# Patient Record
Sex: FEMALE | Race: WHITE | NOT HISPANIC OR LATINO | Employment: OTHER | URBAN - NONMETROPOLITAN AREA
[De-identification: names, ages, dates, MRNs, and addresses within clinical notes are randomized per-mention and may not be internally consistent; named-entity substitution may affect disease eponyms.]

---

## 2019-04-06 ENCOUNTER — APPOINTMENT (OUTPATIENT)
Dept: CT IMAGING | Facility: HOSPITAL | Age: 79
End: 2019-04-06

## 2019-04-06 ENCOUNTER — HOSPITAL ENCOUNTER (EMERGENCY)
Facility: HOSPITAL | Age: 79
Discharge: HOME | End: 2019-04-06
Attending: EMERGENCY MEDICINE | Admitting: EMERGENCY MEDICINE

## 2019-04-06 VITALS
DIASTOLIC BLOOD PRESSURE: 66 MMHG | HEART RATE: 88 BPM | RESPIRATION RATE: 18 BRPM | OXYGEN SATURATION: 97 % | SYSTOLIC BLOOD PRESSURE: 129 MMHG | HEIGHT: 62 IN | BODY MASS INDEX: 27.23 KG/M2 | WEIGHT: 148 LBS | TEMPERATURE: 97.5 F

## 2019-04-06 DIAGNOSIS — N28.9 ACUTE RENAL INSUFFICIENCY: ICD-10-CM

## 2019-04-06 DIAGNOSIS — R19.7 COMBINED ABDOMINAL PAIN, VOMITING, AND DIARRHEA: Primary | ICD-10-CM

## 2019-04-06 DIAGNOSIS — R10.9 COMBINED ABDOMINAL PAIN, VOMITING, AND DIARRHEA: Primary | ICD-10-CM

## 2019-04-06 DIAGNOSIS — R11.10 COMBINED ABDOMINAL PAIN, VOMITING, AND DIARRHEA: Primary | ICD-10-CM

## 2019-04-06 DIAGNOSIS — E86.0 DEHYDRATION: ICD-10-CM

## 2019-04-06 LAB
ALBUMIN SERPL-MCNC: 3 G/DL (ref 3.5–5)
ALBUMIN/GLOB SERPL: 1 G/DL (ref 1.1–2.5)
ALP SERPL-CCNC: 94 U/L (ref 24–120)
ALT SERPL W P-5'-P-CCNC: 38 U/L (ref 0–54)
ANION GAP SERPL CALCULATED.3IONS-SCNC: 11 MMOL/L (ref 4–13)
AST SERPL-CCNC: 82 U/L (ref 7–45)
BASOPHILS # BLD AUTO: 0.07 10*3/MM3 (ref 0–0.2)
BASOPHILS NFR BLD AUTO: 0.9 % (ref 0–2)
BILIRUB SERPL-MCNC: 0.6 MG/DL (ref 0.1–1)
BILIRUB UR QL STRIP: NEGATIVE
BUN BLD-MCNC: 40 MG/DL (ref 5–21)
BUN/CREAT SERPL: 25.6 (ref 7–25)
CALCIUM SPEC-SCNC: 9 MG/DL (ref 8.4–10.4)
CHLORIDE SERPL-SCNC: 109 MMOL/L (ref 98–110)
CLARITY UR: CLEAR
CO2 SERPL-SCNC: 20 MMOL/L (ref 24–31)
COLOR UR: YELLOW
CREAT BLD-MCNC: 1.56 MG/DL (ref 0.5–1.4)
D-LACTATE SERPL-SCNC: 1.8 MMOL/L (ref 0.5–2)
DEPRECATED RDW RBC AUTO: 52.7 FL (ref 40–54)
EOSINOPHIL # BLD AUTO: 0.19 10*3/MM3 (ref 0–0.7)
EOSINOPHIL NFR BLD AUTO: 2.3 % (ref 0–4)
ERYTHROCYTE [DISTWIDTH] IN BLOOD BY AUTOMATED COUNT: 17.1 % (ref 12–15)
GFR SERPL CREATININE-BSD FRML MDRD: 32 ML/MIN/1.73
GLOBULIN UR ELPH-MCNC: 3 GM/DL
GLUCOSE BLD-MCNC: 96 MG/DL (ref 70–100)
GLUCOSE UR STRIP-MCNC: NEGATIVE MG/DL
HCT VFR BLD AUTO: 36.4 % (ref 37–47)
HGB BLD-MCNC: 11.8 G/DL (ref 12–16)
HGB UR QL STRIP.AUTO: NEGATIVE
IMM GRANULOCYTES # BLD AUTO: 0.04 10*3/MM3 (ref 0–0.05)
IMM GRANULOCYTES NFR BLD AUTO: 0.5 % (ref 0–5)
KETONES UR QL STRIP: ABNORMAL
LEUKOCYTE ESTERASE UR QL STRIP.AUTO: NEGATIVE
LIPASE SERPL-CCNC: 425 U/L (ref 23–203)
LYMPHOCYTES # BLD AUTO: 1.92 10*3/MM3 (ref 0.72–4.86)
LYMPHOCYTES NFR BLD AUTO: 23.5 % (ref 15–45)
MCH RBC QN AUTO: 27.2 PG (ref 28–32)
MCHC RBC AUTO-ENTMCNC: 32.4 G/DL (ref 33–36)
MCV RBC AUTO: 83.9 FL (ref 82–98)
MONOCYTES # BLD AUTO: 0.54 10*3/MM3 (ref 0.19–1.3)
MONOCYTES NFR BLD AUTO: 6.6 % (ref 4–12)
NEUTROPHILS # BLD AUTO: 5.41 10*3/MM3 (ref 1.87–8.4)
NEUTROPHILS NFR BLD AUTO: 66.2 % (ref 39–78)
NITRITE UR QL STRIP: NEGATIVE
NRBC BLD AUTO-RTO: 0 /100 WBC (ref 0–0)
PH UR STRIP.AUTO: <=5 [PH] (ref 5–8)
PLATELET # BLD AUTO: 165 10*3/MM3 (ref 130–400)
PMV BLD AUTO: 12.8 FL (ref 6–12)
POTASSIUM BLD-SCNC: 4.8 MMOL/L (ref 3.5–5.3)
PROT SERPL-MCNC: 6 G/DL (ref 6.3–8.7)
PROT UR QL STRIP: NEGATIVE
RBC # BLD AUTO: 4.34 10*6/MM3 (ref 4.2–5.4)
SODIUM BLD-SCNC: 140 MMOL/L (ref 135–145)
SP GR UR STRIP: 1.02 (ref 1–1.03)
UROBILINOGEN UR QL STRIP: ABNORMAL
WBC NRBC COR # BLD: 8.17 10*3/MM3 (ref 4.8–10.8)

## 2019-04-06 PROCEDURE — 80053 COMPREHEN METABOLIC PANEL: CPT | Performed by: EMERGENCY MEDICINE

## 2019-04-06 PROCEDURE — 25010000002 ONDANSETRON PER 1 MG: Performed by: EMERGENCY MEDICINE

## 2019-04-06 PROCEDURE — 83605 ASSAY OF LACTIC ACID: CPT | Performed by: EMERGENCY MEDICINE

## 2019-04-06 PROCEDURE — 83690 ASSAY OF LIPASE: CPT | Performed by: EMERGENCY MEDICINE

## 2019-04-06 PROCEDURE — 74176 CT ABD & PELVIS W/O CONTRAST: CPT

## 2019-04-06 PROCEDURE — 99283 EMERGENCY DEPT VISIT LOW MDM: CPT

## 2019-04-06 PROCEDURE — 85025 COMPLETE CBC W/AUTO DIFF WBC: CPT | Performed by: EMERGENCY MEDICINE

## 2019-04-06 PROCEDURE — 96374 THER/PROPH/DIAG INJ IV PUSH: CPT

## 2019-04-06 PROCEDURE — 81003 URINALYSIS AUTO W/O SCOPE: CPT | Performed by: EMERGENCY MEDICINE

## 2019-04-06 RX ORDER — SODIUM CHLORIDE 0.9 % (FLUSH) 0.9 %
10 SYRINGE (ML) INJECTION AS NEEDED
Status: DISCONTINUED | OUTPATIENT
Start: 2019-04-06 | End: 2019-04-06 | Stop reason: HOSPADM

## 2019-04-06 RX ORDER — ONDANSETRON 2 MG/ML
4 INJECTION INTRAMUSCULAR; INTRAVENOUS ONCE
Status: COMPLETED | OUTPATIENT
Start: 2019-04-06 | End: 2019-04-06

## 2019-04-06 RX ADMIN — SODIUM CHLORIDE, PRESERVATIVE FREE 10 ML: 5 INJECTION INTRAVENOUS at 18:17

## 2019-04-06 RX ADMIN — SODIUM CHLORIDE 1000 ML: 9 INJECTION, SOLUTION INTRAVENOUS at 18:16

## 2019-04-06 RX ADMIN — ONDANSETRON HYDROCHLORIDE 4 MG: 2 SOLUTION INTRAMUSCULAR; INTRAVENOUS at 18:15

## 2019-04-07 NOTE — ED PROVIDER NOTES
"    [facility]  emergency department encounter      Pt Name: Rosemary Petersen  MRN: [id number]  Birthdate 1940  Date of evaluation: 4/6/2019      CHIEF COMPLAINT       Chief Complaint   Patient presents with   • Abdominal Pain   • Nausea   • Diarrhea       Nurses Notes reviewed and I agree except as noted in the HPI.      HISTORY OF PRESENT ILLNESS    Rosemary Petersen is a 78 y.o. female who presents to the emergency department complaining of diffuse abdominal pain, nausea, vomiting, and diarrhea for the past 23 days.  She denies dysuria, fever, chills, chest pain, shortness of breath, melena, hematochezia.  She does note a history of hysterectomy, cholecystectomy, and appendectomy.  She denies any other chronic medical conditions.  She has Phenergan and Zofran at home.    REVIEW OF SYSTEMS     All systems reviewed and otherwise negative except as listed above in HPI      PAST MEDICAL HISTORY   History reviewed. No pertinent past medical history.    SURGICAL HISTORY      has no past surgical history on file.    CURRENT MEDICATIONS        Medication List      You have not been prescribed any medications.       ALLERGIES     has No Known Allergies.    FAMILY HISTORY     has no family status information on file.    family history is not on file.    SOCIAL HISTORY          PHYSICAL EXAM     INITIAL VITALS:  height is 157.5 cm (62\") and weight is 67.1 kg (148 lb). Her oral temperature is 97.5 °F (36.4 °C). Her blood pressure is 129/66 and her pulse is 88. Her respiration is 18 and oxygen saturation is 97%.    Physical Exam    CONSTITUTIONAL: Well developed,  not diaphoretic nor distressed  HENT: Normocephalic, atraumatic, oropharynx clear and dry  EYES: PERRL, EOM normal, no discharge, no scleral icterus  NECK: ROM normal, supple, no tracheal deviation nor JVD, no stridor  CARDIOVASCULAR: Tachycardic rate, normal rhythm, heart sounds normal, no rub no gallop, intact distal pulses, normal cap " refill  PULMONARY: Normal effort and breath sounds, no distress, no wheezes, rhonchi or rales, no chest tenderness  ABDOMINAL: Soft, mild diffuse tenderness, no guarding, no mass, no rebound, no hernia  GENITOURINARY/ANORECTAL: deferred  MUSCULOSKELETAL: ROM normal, no tenderness nor deformity, no edema  NEUROLOGICAL: Alert, oriented x 3, normal tone, sensation normal  SKIN: Warm, dry, no erythema, no rash, normal color  PSYCH: Mood and affect normal, behavior normal, thought content and judgement normal.      DIAGNOSTIC RESULTS     EKG: All EKG's are interpreted by the Emergency Department Physician who either signs or Co-signs this chart in the absence of a cardiologist.  None    RADIOLOGY: non-plain film images(s) such as CT, Ultrasound and MRI are read by the radiologist.  Plain radiographic images are visualized and preliminarily interpreted by the emergency physician unless otherwise stated below.  Ct Abdomen Pelvis Without Contrast    Result Date: 4/6/2019  1. Right nephrolithiasis.   2. Left ventral hernia which contains a loop of small bowel. This is not causing an obstruction at this time. 3. Diverticulosis.   This report was finalized on 04/06/2019 18:50 by Dr. Herman Feliciano MD.             LABS:   Lab Results (last 24 hours)     Procedure Component Value Units Date/Time    CBC & Differential [[id number]] Collected:  04/06/19 1814    Specimen:  Blood Updated:  04/06/19 1826    Narrative:       The following orders were created for panel order CBC & Differential.  Procedure                               Abnormality         Status                     ---------                               -----------         ------                     CBC Auto Differential[[id number]]        Abnormal            Final result                 Please view results for these tests on the individual orders.    Comprehensive Metabolic Panel [[id number]]  (Abnormal) Collected:  04/06/19 1814    Specimen:  Blood Updated:  04/06/19  1838     Glucose 96 mg/dL      BUN 40 mg/dL      Creatinine 1.56 mg/dL      Sodium 140 mmol/L      Potassium 4.8 mmol/L      Comment: Specimen hemolyzed.  Results may be affected.        Chloride 109 mmol/L      CO2 20.0 mmol/L      Calcium 9.0 mg/dL      Total Protein 6.0 g/dL      Albumin 3.00 g/dL      ALT (SGPT) 38 U/L      Comment: Specimen hemolyzed.  Results may be affected.        AST (SGOT) 82 U/L      Comment: Specimen hemolyzed.  Results may be affected.        Alkaline Phosphatase 94 U/L      Comment: Specimen hemolyzed. Results may be affected.        Total Bilirubin 0.6 mg/dL      eGFR Non African Amer 32 mL/min/1.73      Globulin 3.0 gm/dL      A/G Ratio 1.0 g/dL      BUN/Creatinine Ratio 25.6     Anion Gap 11.0 mmol/L     Narrative:       GFR Normal >60  Chronic Kidney Disease <60  Kidney Failure <15    Lipase [[id number]]  (Abnormal) Collected:  04/06/19 1814    Specimen:  Blood Updated:  04/06/19 1837     Lipase 425 U/L     Lactic Acid, Plasma [[id number]]  (Normal) Collected:  04/06/19 1814    Specimen:  Blood Updated:  04/06/19 1838     Lactate 1.8 mmol/L     CBC Auto Differential [[id number]]  (Abnormal) Collected:  04/06/19 1814    Specimen:  Blood Updated:  04/06/19 1826     WBC 8.17 10*3/mm3      RBC 4.34 10*6/mm3      Hemoglobin 11.8 g/dL      Hematocrit 36.4 %      MCV 83.9 fL      MCH 27.2 pg      MCHC 32.4 g/dL      RDW 17.1 %      RDW-SD 52.7 fl      MPV 12.8 fL      Platelets 165 10*3/mm3      Neutrophil % 66.2 %      Lymphocyte % 23.5 %      Monocyte % 6.6 %      Eosinophil % 2.3 %      Basophil % 0.9 %      Immature Grans % 0.5 %      Neutrophils, Absolute 5.41 10*3/mm3      Lymphocytes, Absolute 1.92 10*3/mm3      Monocytes, Absolute 0.54 10*3/mm3      Eosinophils, Absolute 0.19 10*3/mm3      Basophils, Absolute 0.07 10*3/mm3      Immature Grans, Absolute 0.04 10*3/mm3      nRBC 0.0 /100 WBC     Urinalysis With Culture If Indicated - Urine, Clean Catch [[id number]]  (Abnormal) Collected:  " 04/06/19 1842    Specimen:  Urine, Clean Catch Updated:  04/06/19 1853     Color, UA Yellow     Appearance, UA Clear     pH, UA <=5.0     Specific Gravity, UA 1.024     Glucose, UA Negative     Ketones, UA Trace     Bilirubin, UA Negative     Blood, UA Negative     Protein, UA Negative     Leuk Esterase, UA Negative     Nitrite, UA Negative     Urobilinogen, UA 0.2 E.U./dL    Narrative:       Urine microscopic not indicated.          EMERGENCY DEPARTMENT COURSE:   Vitals:    Vitals:    04/06/19 1700 04/06/19 2030   BP: 129/61 129/66   BP Location: Right arm Left arm   Patient Position: Sitting Lying   Pulse: 120 88   Resp: 16 18   Temp: 98.5 °F (36.9 °C) 97.5 °F (36.4 °C)   TempSrc: Oral Oral   SpO2: 97% 97%   Weight: 67.1 kg (148 lb)    Height: 157.5 cm (62\")        The patient was given the following medications:  Medications   sodium chloride 0.9 % flush 10 mL (10 mL Intravenous Given 4/6/19 1817)   sodium chloride 0.9 % bolus 1,000 mL (0 mL Intravenous Stopped 4/6/19 1845)   ondansetron (ZOFRAN) injection 4 mg (4 mg Intravenous Given 4/6/19 1815)            CRITICAL CARE:  none    CONSULTS:  none    PROCEDURES:  Procedures        Patient presents to the emergency department with 23 days of abdominal pain, vomiting, diarrhea.  She has dry mucous membranes, diffuse dental tenderness, and is tachycardic.  CT scan of abdomen and pelvis shows a hiatal hernia with no acute abnormalities.  Labs show renal insufficiency of unknown chronicity.  Patient denies any history of renal insufficiency so this is likely acute renal insufficiency secondary to dehydration.  Her lipase is also elevated.  She received IV fluids and Zofran.  She felt much better and requested discharge home.  She tolerated an oral fluid challenge.  She was no longer tachycardic upon reevaluation.  She has Phenergan and Zofran at home.  She would like to follow-up with her PCP.  She is comfortable at time of discharge and agreeable with plan of " care.    FINAL IMPRESSION      1. Combined abdominal pain, vomiting, and diarrhea    2. Acute renal insufficiency    3. Dehydration          DISPOSITION/PLAN   DC      PATIENT REFERRED TO:  Daquan Bautista MD  [address] [facility] [id number] [phone]    Schedule an appointment as soon as possible for a visit in 2 days        DISCHARGE MEDICATIONS:     Medication List      You have not been prescribed any medications.       (Please note that portions of this note were completed with a voice recognition program.)    DO Salvador Walker Jason Paul, DO  04/06/19 5686

## 2019-04-07 NOTE — DISCHARGE INSTRUCTIONS
Read all instructions in this handout.   Call your primary care physician for a follow up appointment in 1-2 days.   Return to the emergency department as soon as possible for worsening of your symptoms or for any other concerns that you may have.

## 2019-04-07 NOTE — ED NOTES
Patient is a 78 year old female that presents to ER with complaints of  N/v/d intermittent for the past four weeks.Patient currently denies any active abdominal pain.      Tone Peña RN  04/06/19 1920

## 2021-01-01 ENCOUNTER — ANESTHESIA EVENT (OUTPATIENT)
Dept: GASTROENTEROLOGY | Facility: HOSPITAL | Age: 81
End: 2021-01-01

## 2021-01-01 ENCOUNTER — TELEPHONE (OUTPATIENT)
Dept: GASTROENTEROLOGY | Facility: CLINIC | Age: 81
End: 2021-01-01

## 2021-01-01 ENCOUNTER — APPOINTMENT (OUTPATIENT)
Dept: GENERAL RADIOLOGY | Facility: HOSPITAL | Age: 81
End: 2021-01-01

## 2021-01-01 ENCOUNTER — READMISSION MANAGEMENT (OUTPATIENT)
Dept: CALL CENTER | Facility: HOSPITAL | Age: 81
End: 2021-01-01

## 2021-01-01 ENCOUNTER — HOSPITAL ENCOUNTER (EMERGENCY)
Facility: HOSPITAL | Age: 81
Discharge: HOME | End: 2021-02-25
Attending: EMERGENCY MEDICINE | Admitting: EMERGENCY MEDICINE

## 2021-01-01 ENCOUNTER — APPOINTMENT (OUTPATIENT)
Dept: MRI IMAGING | Facility: HOSPITAL | Age: 81
End: 2021-01-01

## 2021-01-01 ENCOUNTER — TELEPHONE (OUTPATIENT)
Dept: NEUROSURGERY | Facility: CLINIC | Age: 81
End: 2021-01-01

## 2021-01-01 ENCOUNTER — TELEPHONE (OUTPATIENT)
Dept: VASCULAR SURGERY | Facility: CLINIC | Age: 81
End: 2021-01-01

## 2021-01-01 ENCOUNTER — APPOINTMENT (OUTPATIENT)
Dept: CT IMAGING | Facility: HOSPITAL | Age: 81
End: 2021-01-01

## 2021-01-01 ENCOUNTER — OUTSIDE FACILITY SERVICE (OUTPATIENT)
Dept: FAMILY MEDICINE CLINIC | Facility: CLINIC | Age: 81
End: 2021-01-01

## 2021-01-01 ENCOUNTER — HOSPITAL ENCOUNTER (OUTPATIENT)
Facility: HOSPITAL | Age: 81
Setting detail: OBSERVATION
Discharge: HOME | End: 2021-03-10
Attending: EMERGENCY MEDICINE | Admitting: INTERNAL MEDICINE

## 2021-01-01 ENCOUNTER — APPOINTMENT (OUTPATIENT)
Dept: CARDIOLOGY | Facility: HOSPITAL | Age: 81
End: 2021-01-01

## 2021-01-01 ENCOUNTER — APPOINTMENT (OUTPATIENT)
Dept: ULTRASOUND IMAGING | Facility: HOSPITAL | Age: 81
End: 2021-01-01

## 2021-01-01 ENCOUNTER — HOSPITAL ENCOUNTER (INPATIENT)
Facility: HOSPITAL | Age: 81
LOS: 2 days | Discharge: HOME | End: 2021-03-07
Attending: INTERNAL MEDICINE | Admitting: INTERNAL MEDICINE

## 2021-01-01 ENCOUNTER — ANESTHESIA (OUTPATIENT)
Dept: GASTROENTEROLOGY | Facility: HOSPITAL | Age: 81
End: 2021-01-01

## 2021-01-01 ENCOUNTER — DOCUMENTATION (OUTPATIENT)
Dept: CT IMAGING | Facility: HOSPITAL | Age: 81
End: 2021-01-01

## 2021-01-01 ENCOUNTER — OFFICE VISIT (OUTPATIENT)
Dept: GASTROENTEROLOGY | Facility: CLINIC | Age: 81
End: 2021-01-01

## 2021-01-01 VITALS
WEIGHT: 125 LBS | HEART RATE: 72 BPM | OXYGEN SATURATION: 94 % | RESPIRATION RATE: 18 BRPM | TEMPERATURE: 97.9 F | DIASTOLIC BLOOD PRESSURE: 76 MMHG | BODY MASS INDEX: 23 KG/M2 | HEIGHT: 62 IN | SYSTOLIC BLOOD PRESSURE: 159 MMHG

## 2021-01-01 VITALS
TEMPERATURE: 98 F | DIASTOLIC BLOOD PRESSURE: 67 MMHG | OXYGEN SATURATION: 97 % | WEIGHT: 128.56 LBS | HEART RATE: 67 BPM | BODY MASS INDEX: 23.66 KG/M2 | RESPIRATION RATE: 16 BRPM | HEIGHT: 62 IN | SYSTOLIC BLOOD PRESSURE: 152 MMHG

## 2021-01-01 VITALS
BODY MASS INDEX: 23.52 KG/M2 | OXYGEN SATURATION: 95 % | WEIGHT: 127.8 LBS | HEART RATE: 69 BPM | SYSTOLIC BLOOD PRESSURE: 149 MMHG | HEIGHT: 62 IN | DIASTOLIC BLOOD PRESSURE: 67 MMHG | RESPIRATION RATE: 18 BRPM | TEMPERATURE: 97.7 F

## 2021-01-01 VITALS
DIASTOLIC BLOOD PRESSURE: 78 MMHG | TEMPERATURE: 97.5 F | WEIGHT: 124 LBS | HEIGHT: 62 IN | BODY MASS INDEX: 22.82 KG/M2 | OXYGEN SATURATION: 95 % | SYSTOLIC BLOOD PRESSURE: 122 MMHG | HEART RATE: 80 BPM

## 2021-01-01 DIAGNOSIS — K29.00 OTHER ACUTE GASTRITIS WITHOUT HEMORRHAGE: Primary | ICD-10-CM

## 2021-01-01 DIAGNOSIS — G45.9 TIA (TRANSIENT ISCHEMIC ATTACK): Primary | ICD-10-CM

## 2021-01-01 DIAGNOSIS — Z78.9 NONSMOKER: ICD-10-CM

## 2021-01-01 DIAGNOSIS — K44.9 HIATAL HERNIA: ICD-10-CM

## 2021-01-01 DIAGNOSIS — E66.9 OBESITY, UNSPECIFIED OBESITY SEVERITY, UNSPECIFIED OBESITY TYPE: ICD-10-CM

## 2021-01-01 DIAGNOSIS — K59.00 CONSTIPATION, UNSPECIFIED CONSTIPATION TYPE: ICD-10-CM

## 2021-01-01 DIAGNOSIS — M51.36 BULGE OF LUMBAR DISC WITHOUT MYELOPATHY: Primary | ICD-10-CM

## 2021-01-01 DIAGNOSIS — R19.7 DIARRHEA, UNSPECIFIED TYPE: Primary | ICD-10-CM

## 2021-01-01 DIAGNOSIS — M48.061 FORAMINAL STENOSIS OF LUMBAR REGION: ICD-10-CM

## 2021-01-01 DIAGNOSIS — R11.2 INTRACTABLE NAUSEA AND VOMITING: ICD-10-CM

## 2021-01-01 DIAGNOSIS — K43.9 VENTRAL HERNIA WITHOUT OBSTRUCTION OR GANGRENE: ICD-10-CM

## 2021-01-01 DIAGNOSIS — N20.0 KIDNEY STONE: ICD-10-CM

## 2021-01-01 DIAGNOSIS — Z74.09 IMPAIRED MOBILITY: ICD-10-CM

## 2021-01-01 DIAGNOSIS — M51.36 BULGE OF LUMBAR DISC WITHOUT MYELOPATHY: ICD-10-CM

## 2021-01-01 DIAGNOSIS — N17.0 ACUTE KIDNEY INJURY (AKI) WITH ACUTE TUBULAR NECROSIS (ATN) (HCC): Primary | ICD-10-CM

## 2021-01-01 DIAGNOSIS — M19.90 OSTEOARTHRITIS, UNSPECIFIED OSTEOARTHRITIS TYPE, UNSPECIFIED SITE: ICD-10-CM

## 2021-01-01 DIAGNOSIS — E86.0 DEHYDRATION: ICD-10-CM

## 2021-01-01 LAB
ALBUMIN SERPL-MCNC: 3.3 G/DL (ref 3.5–5.2)
ALBUMIN SERPL-MCNC: 4.3 G/DL (ref 3.5–5.2)
ALBUMIN/GLOB SERPL: 1.1 G/DL
ALBUMIN/GLOB SERPL: 1.3 G/DL
ALP SERPL-CCNC: 120 U/L (ref 39–117)
ALP SERPL-CCNC: 146 U/L (ref 39–117)
ALT SERPL W P-5'-P-CCNC: 31 U/L (ref 1–33)
ALT SERPL W P-5'-P-CCNC: 47 U/L (ref 1–33)
AMMONIA BLD-SCNC: <10 UMOL/L (ref 11–51)
AMYLASE SERPL-CCNC: 105 U/L (ref 28–100)
ANION GAP SERPL CALCULATED.3IONS-SCNC: 10 MMOL/L (ref 5–15)
ANION GAP SERPL CALCULATED.3IONS-SCNC: 12 MMOL/L (ref 5–15)
ANION GAP SERPL CALCULATED.3IONS-SCNC: 8 MMOL/L (ref 5–15)
ANION GAP SERPL CALCULATED.3IONS-SCNC: 9 MMOL/L (ref 5–15)
ANION GAP SERPL CALCULATED.3IONS-SCNC: 9 MMOL/L (ref 5–15)
APTT PPP: 24.7 SECONDS (ref 24.1–35)
AST SERPL-CCNC: 30 U/L (ref 1–32)
AST SERPL-CCNC: 47 U/L (ref 1–32)
BACTERIA UR QL AUTO: ABNORMAL /HPF
BASOPHILS # BLD AUTO: 0.04 10*3/MM3 (ref 0–0.2)
BASOPHILS # BLD AUTO: 0.05 10*3/MM3 (ref 0–0.2)
BASOPHILS # BLD AUTO: 0.05 10*3/MM3 (ref 0–0.2)
BASOPHILS # BLD AUTO: 0.06 10*3/MM3 (ref 0–0.2)
BASOPHILS NFR BLD AUTO: 0.8 % (ref 0–1.5)
BASOPHILS NFR BLD AUTO: 0.9 % (ref 0–1.5)
BASOPHILS NFR BLD AUTO: 1 % (ref 0–1.5)
BASOPHILS NFR BLD AUTO: 1.1 % (ref 0–1.5)
BH CV ECHO MEAS - AO MAX PG (FULL): 2.1 MMHG
BH CV ECHO MEAS - AO MAX PG: 10.5 MMHG
BH CV ECHO MEAS - AO MEAN PG (FULL): 3 MMHG
BH CV ECHO MEAS - AO MEAN PG: 7 MMHG
BH CV ECHO MEAS - AO ROOT AREA (BSA CORRECTED): 1.9
BH CV ECHO MEAS - AO ROOT AREA: 7.1 CM^2
BH CV ECHO MEAS - AO ROOT DIAM: 3 CM
BH CV ECHO MEAS - AO V2 MAX: 162 CM/SEC
BH CV ECHO MEAS - AO V2 MEAN: 125 CM/SEC
BH CV ECHO MEAS - AO V2 VTI: 41.1 CM
BH CV ECHO MEAS - AVA(I,A): 2.3 CM^2
BH CV ECHO MEAS - AVA(I,D): 2.3 CM^2
BH CV ECHO MEAS - AVA(V,A): 2.8 CM^2
BH CV ECHO MEAS - AVA(V,D): 2.8 CM^2
BH CV ECHO MEAS - BSA(HAYCOCK): 1.6 M^2
BH CV ECHO MEAS - BSA: 1.6 M^2
BH CV ECHO MEAS - BZI_BMI: 23.2 KILOGRAMS/M^2
BH CV ECHO MEAS - BZI_METRIC_HEIGHT: 157.5 CM
BH CV ECHO MEAS - BZI_METRIC_WEIGHT: 57.6 KG
BH CV ECHO MEAS - EDV(CUBED): 104.5 ML
BH CV ECHO MEAS - EDV(MOD-SP4): 42.4 ML
BH CV ECHO MEAS - EDV(TEICH): 102.9 ML
BH CV ECHO MEAS - EF(CUBED): 88.4 %
BH CV ECHO MEAS - EF(MOD-SP4): 60.8 %
BH CV ECHO MEAS - EF(TEICH): 82.4 %
BH CV ECHO MEAS - ESV(CUBED): 12.2 ML
BH CV ECHO MEAS - ESV(MOD-SP4): 16.6 ML
BH CV ECHO MEAS - ESV(TEICH): 18.1 ML
BH CV ECHO MEAS - FS: 51.2 %
BH CV ECHO MEAS - IVS/LVPW: 1.3
BH CV ECHO MEAS - IVSD: 1.1 CM
BH CV ECHO MEAS - LA DIMENSION: 3.4 CM
BH CV ECHO MEAS - LA/AO: 1.1
BH CV ECHO MEAS - LAT PEAK E' VEL: 6.1 CM/SEC
BH CV ECHO MEAS - LV DIASTOLIC VOL/BSA (35-75): 26.9 ML/M^2
BH CV ECHO MEAS - LV MASS(C)D: 166.8 GRAMS
BH CV ECHO MEAS - LV MASS(C)DI: 105.9 GRAMS/M^2
BH CV ECHO MEAS - LV MAX PG: 8.4 MMHG
BH CV ECHO MEAS - LV MEAN PG: 4 MMHG
BH CV ECHO MEAS - LV SYSTOLIC VOL/BSA (12-30): 10.5 ML/M^2
BH CV ECHO MEAS - LV V1 MAX: 145 CM/SEC
BH CV ECHO MEAS - LV V1 MEAN: 97.4 CM/SEC
BH CV ECHO MEAS - LV V1 VTI: 30.1 CM
BH CV ECHO MEAS - LVIDD: 4.7 CM
BH CV ECHO MEAS - LVIDS: 2.3 CM
BH CV ECHO MEAS - LVLD AP4: 7.2 CM
BH CV ECHO MEAS - LVLS AP4: 6.7 CM
BH CV ECHO MEAS - LVOT AREA (M): 3.1 CM^2
BH CV ECHO MEAS - LVOT AREA: 3.1 CM^2
BH CV ECHO MEAS - LVOT DIAM: 2 CM
BH CV ECHO MEAS - LVPWD: 0.89 CM
BH CV ECHO MEAS - MED PEAK E' VEL: 3.37 CM/SEC
BH CV ECHO MEAS - MV A MAX VEL: 108 CM/SEC
BH CV ECHO MEAS - MV DEC TIME: 0.41 SEC
BH CV ECHO MEAS - MV E MAX VEL: 61.1 CM/SEC
BH CV ECHO MEAS - MV E/A: 0.57
BH CV ECHO MEAS - PA MAX PG: 3.5 MMHG
BH CV ECHO MEAS - PA V2 MAX: 93.6 CM/SEC
BH CV ECHO MEAS - RAP SYSTOLE: 5 MMHG
BH CV ECHO MEAS - RVSP: 38.4 MMHG
BH CV ECHO MEAS - SI(AO): 184.3 ML/M^2
BH CV ECHO MEAS - SI(CUBED): 58.6 ML/M^2
BH CV ECHO MEAS - SI(LVOT): 60 ML/M^2
BH CV ECHO MEAS - SI(MOD-SP4): 16.4 ML/M^2
BH CV ECHO MEAS - SI(TEICH): 53.8 ML/M^2
BH CV ECHO MEAS - SV(AO): 290.5 ML
BH CV ECHO MEAS - SV(CUBED): 92.3 ML
BH CV ECHO MEAS - SV(LVOT): 94.6 ML
BH CV ECHO MEAS - SV(MOD-SP4): 25.8 ML
BH CV ECHO MEAS - SV(TEICH): 84.7 ML
BH CV ECHO MEAS - TR MAX VEL: 289 CM/SEC
BH CV ECHO MEASUREMENTS AVERAGE E/E' RATIO: 12.9
BILIRUB SERPL-MCNC: 0.2 MG/DL (ref 0–1.2)
BILIRUB SERPL-MCNC: <0.2 MG/DL (ref 0–1.2)
BILIRUB UR QL STRIP: NEGATIVE
BUN SERPL-MCNC: 12 MG/DL (ref 8–23)
BUN SERPL-MCNC: 27 MG/DL (ref 8–23)
BUN SERPL-MCNC: 28 MG/DL (ref 8–23)
BUN SERPL-MCNC: 43 MG/DL (ref 8–23)
BUN SERPL-MCNC: 55 MG/DL (ref 8–23)
BUN/CREAT SERPL: 14.6 (ref 7–25)
BUN/CREAT SERPL: 23.9 (ref 7–25)
BUN/CREAT SERPL: 24.5 (ref 7–25)
BUN/CREAT SERPL: 27.4 (ref 7–25)
BUN/CREAT SERPL: 28.7 (ref 7–25)
CALCIUM SPEC-SCNC: 10.3 MG/DL (ref 8.6–10.5)
CALCIUM SPEC-SCNC: 10.3 MG/DL (ref 8.6–10.5)
CALCIUM SPEC-SCNC: 10.4 MG/DL (ref 8.6–10.5)
CALCIUM SPEC-SCNC: 9.3 MG/DL (ref 8.6–10.5)
CALCIUM SPEC-SCNC: 9.6 MG/DL (ref 8.6–10.5)
CHLORIDE SERPL-SCNC: 101 MMOL/L (ref 98–107)
CHLORIDE SERPL-SCNC: 102 MMOL/L (ref 98–107)
CHLORIDE SERPL-SCNC: 103 MMOL/L (ref 98–107)
CHLORIDE SERPL-SCNC: 105 MMOL/L (ref 98–107)
CHLORIDE SERPL-SCNC: 107 MMOL/L (ref 98–107)
CHOLEST SERPL-MCNC: 116 MG/DL (ref 0–200)
CLARITY UR: CLEAR
CO2 SERPL-SCNC: 23 MMOL/L (ref 22–29)
CO2 SERPL-SCNC: 27 MMOL/L (ref 22–29)
CO2 SERPL-SCNC: 29 MMOL/L (ref 22–29)
COLOR UR: YELLOW
CREAT SERPL-MCNC: 0.82 MG/DL (ref 0.57–1)
CREAT SERPL-MCNC: 1.1 MG/DL (ref 0.57–1)
CREAT SERPL-MCNC: 1.17 MG/DL (ref 0.57–1)
CREAT SERPL-MCNC: 1.5 MG/DL (ref 0.57–1)
CREAT SERPL-MCNC: 2.01 MG/DL (ref 0.57–1)
CYTO UR: NORMAL
DEPRECATED RDW RBC AUTO: 53.6 FL (ref 37–54)
DEPRECATED RDW RBC AUTO: 53.9 FL (ref 37–54)
DEPRECATED RDW RBC AUTO: 54.4 FL (ref 37–54)
DEPRECATED RDW RBC AUTO: 54.8 FL (ref 37–54)
DEPRECATED RDW RBC AUTO: 57.5 FL (ref 37–54)
EOSINOPHIL # BLD AUTO: 0.15 10*3/MM3 (ref 0–0.4)
EOSINOPHIL # BLD AUTO: 0.2 10*3/MM3 (ref 0–0.4)
EOSINOPHIL # BLD AUTO: 0.2 10*3/MM3 (ref 0–0.4)
EOSINOPHIL # BLD AUTO: 0.27 10*3/MM3 (ref 0–0.4)
EOSINOPHIL NFR BLD AUTO: 2.5 % (ref 0.3–6.2)
EOSINOPHIL NFR BLD AUTO: 3.5 % (ref 0.3–6.2)
EOSINOPHIL NFR BLD AUTO: 3.8 % (ref 0.3–6.2)
EOSINOPHIL NFR BLD AUTO: 6.8 % (ref 0.3–6.2)
ERYTHROCYTE [DISTWIDTH] IN BLOOD BY AUTOMATED COUNT: 17.3 % (ref 12.3–15.4)
ERYTHROCYTE [DISTWIDTH] IN BLOOD BY AUTOMATED COUNT: 17.4 % (ref 12.3–15.4)
ERYTHROCYTE [DISTWIDTH] IN BLOOD BY AUTOMATED COUNT: 17.4 % (ref 12.3–15.4)
ERYTHROCYTE [DISTWIDTH] IN BLOOD BY AUTOMATED COUNT: 17.5 % (ref 12.3–15.4)
ERYTHROCYTE [DISTWIDTH] IN BLOOD BY AUTOMATED COUNT: 18.6 % (ref 12.3–15.4)
GFR SERPL CREATININE-BSD FRML MDRD: 24 ML/MIN/1.73
GFR SERPL CREATININE-BSD FRML MDRD: 33 ML/MIN/1.73
GFR SERPL CREATININE-BSD FRML MDRD: 44 ML/MIN/1.73
GFR SERPL CREATININE-BSD FRML MDRD: 48 ML/MIN/1.73
GFR SERPL CREATININE-BSD FRML MDRD: 67 ML/MIN/1.73
GLOBULIN UR ELPH-MCNC: 2.9 GM/DL
GLOBULIN UR ELPH-MCNC: 3.2 GM/DL
GLUCOSE BLDC GLUCOMTR-MCNC: 110 MG/DL (ref 70–130)
GLUCOSE BLDC GLUCOMTR-MCNC: 111 MG/DL (ref 70–130)
GLUCOSE BLDC GLUCOMTR-MCNC: 202 MG/DL (ref 70–130)
GLUCOSE BLDC GLUCOMTR-MCNC: 94 MG/DL (ref 70–130)
GLUCOSE SERPL-MCNC: 114 MG/DL (ref 65–99)
GLUCOSE SERPL-MCNC: 115 MG/DL (ref 65–99)
GLUCOSE SERPL-MCNC: 119 MG/DL (ref 65–99)
GLUCOSE SERPL-MCNC: 121 MG/DL (ref 65–99)
GLUCOSE SERPL-MCNC: 95 MG/DL (ref 65–99)
GLUCOSE UR STRIP-MCNC: NEGATIVE MG/DL
HBA1C MFR BLD: 5.6 % (ref 4.8–5.6)
HCT VFR BLD AUTO: 32.8 % (ref 34–46.6)
HCT VFR BLD AUTO: 33.9 % (ref 34–46.6)
HCT VFR BLD AUTO: 35.5 % (ref 34–46.6)
HCT VFR BLD AUTO: 36.6 % (ref 34–46.6)
HCT VFR BLD AUTO: 37.2 % (ref 34–46.6)
HDLC SERPL-MCNC: 50 MG/DL (ref 40–60)
HGB BLD-MCNC: 10.5 G/DL (ref 12–15.9)
HGB BLD-MCNC: 10.9 G/DL (ref 12–15.9)
HGB BLD-MCNC: 11.2 G/DL (ref 12–15.9)
HGB BLD-MCNC: 11.7 G/DL (ref 12–15.9)
HGB BLD-MCNC: 11.8 G/DL (ref 12–15.9)
HGB UR QL STRIP.AUTO: NEGATIVE
HOLD SPECIMEN: NORMAL
HYALINE CASTS UR QL AUTO: ABNORMAL /LPF
IMM GRANULOCYTES # BLD AUTO: 0.01 10*3/MM3 (ref 0–0.05)
IMM GRANULOCYTES # BLD AUTO: 0.02 10*3/MM3 (ref 0–0.05)
IMM GRANULOCYTES NFR BLD AUTO: 0.2 % (ref 0–0.5)
IMM GRANULOCYTES NFR BLD AUTO: 0.2 % (ref 0–0.5)
IMM GRANULOCYTES NFR BLD AUTO: 0.3 % (ref 0–0.5)
IMM GRANULOCYTES NFR BLD AUTO: 0.3 % (ref 0–0.5)
INR PPP: 0.99 (ref 0.91–1.09)
IRON 24H UR-MRATE: 50 MCG/DL (ref 37–145)
IRON SATN MFR SERPL: 20 % (ref 20–50)
KETONES UR QL STRIP: NEGATIVE
LAB AP CASE REPORT: NORMAL
LDLC SERPL CALC-MCNC: 45 MG/DL (ref 0–100)
LDLC/HDLC SERPL: 0.85 {RATIO}
LEFT ATRIUM VOLUME INDEX: 27.4 ML/M2
LEFT ATRIUM VOLUME: 43.3 CM3
LEUKOCYTE ESTERASE UR QL STRIP.AUTO: NEGATIVE
LIPASE SERPL-CCNC: 34 U/L (ref 13–60)
LYMPHOCYTES # BLD AUTO: 0.82 10*3/MM3 (ref 0.7–3.1)
LYMPHOCYTES # BLD AUTO: 0.99 10*3/MM3 (ref 0.7–3.1)
LYMPHOCYTES # BLD AUTO: 1.24 10*3/MM3 (ref 0.7–3.1)
LYMPHOCYTES # BLD AUTO: 1.26 10*3/MM3 (ref 0.7–3.1)
LYMPHOCYTES NFR BLD AUTO: 18.6 % (ref 19.6–45.3)
LYMPHOCYTES NFR BLD AUTO: 20.6 % (ref 19.6–45.3)
LYMPHOCYTES NFR BLD AUTO: 20.7 % (ref 19.6–45.3)
LYMPHOCYTES NFR BLD AUTO: 21.8 % (ref 19.6–45.3)
MAGNESIUM SERPL-MCNC: 1.8 MG/DL (ref 1.6–2.4)
MAGNESIUM SERPL-MCNC: 2.4 MG/DL (ref 1.6–2.4)
MAXIMAL PREDICTED HEART RATE: 139 BPM
MCH RBC QN AUTO: 27 PG (ref 26.6–33)
MCH RBC QN AUTO: 27.1 PG (ref 26.6–33)
MCH RBC QN AUTO: 27.1 PG (ref 26.6–33)
MCH RBC QN AUTO: 27.3 PG (ref 26.6–33)
MCH RBC QN AUTO: 27.7 PG (ref 26.6–33)
MCHC RBC AUTO-ENTMCNC: 31.5 G/DL (ref 31.5–35.7)
MCHC RBC AUTO-ENTMCNC: 31.7 G/DL (ref 31.5–35.7)
MCHC RBC AUTO-ENTMCNC: 32 G/DL (ref 31.5–35.7)
MCHC RBC AUTO-ENTMCNC: 32 G/DL (ref 31.5–35.7)
MCHC RBC AUTO-ENTMCNC: 32.2 G/DL (ref 31.5–35.7)
MCV RBC AUTO: 84.9 FL (ref 79–97)
MCV RBC AUTO: 85.1 FL (ref 79–97)
MCV RBC AUTO: 85.4 FL (ref 79–97)
MCV RBC AUTO: 86 FL (ref 79–97)
MCV RBC AUTO: 86 FL (ref 79–97)
MONOCYTES # BLD AUTO: 0.32 10*3/MM3 (ref 0.1–0.9)
MONOCYTES # BLD AUTO: 0.43 10*3/MM3 (ref 0.1–0.9)
MONOCYTES # BLD AUTO: 0.52 10*3/MM3 (ref 0.1–0.9)
MONOCYTES # BLD AUTO: 0.55 10*3/MM3 (ref 0.1–0.9)
MONOCYTES NFR BLD AUTO: 8 % (ref 5–12)
MONOCYTES NFR BLD AUTO: 8.1 % (ref 5–12)
MONOCYTES NFR BLD AUTO: 9 % (ref 5–12)
MONOCYTES NFR BLD AUTO: 9.1 % (ref 5–12)
NEUTROPHILS NFR BLD AUTO: 2.53 10*3/MM3 (ref 1.7–7)
NEUTROPHILS NFR BLD AUTO: 3.65 10*3/MM3 (ref 1.7–7)
NEUTROPHILS NFR BLD AUTO: 3.66 10*3/MM3 (ref 1.7–7)
NEUTROPHILS NFR BLD AUTO: 4.07 10*3/MM3 (ref 1.7–7)
NEUTROPHILS NFR BLD AUTO: 63.3 % (ref 42.7–76)
NEUTROPHILS NFR BLD AUTO: 64.3 % (ref 42.7–76)
NEUTROPHILS NFR BLD AUTO: 66.7 % (ref 42.7–76)
NEUTROPHILS NFR BLD AUTO: 68.4 % (ref 42.7–76)
NITRITE UR QL STRIP: NEGATIVE
NRBC BLD AUTO-RTO: 0 /100 WBC (ref 0–0.2)
PATH REPORT.FINAL DX SPEC: NORMAL
PATH REPORT.GROSS SPEC: NORMAL
PH UR STRIP.AUTO: 5.5 [PH] (ref 5–8)
PHOSPHATE SERPL-MCNC: 2 MG/DL (ref 2.5–4.5)
PHOSPHATE SERPL-MCNC: 3 MG/DL (ref 2.5–4.5)
PLATELET # BLD AUTO: 240 10*3/MM3 (ref 140–450)
PLATELET # BLD AUTO: 255 10*3/MM3 (ref 140–450)
PLATELET # BLD AUTO: 262 10*3/MM3 (ref 140–450)
PLATELET # BLD AUTO: 265 10*3/MM3 (ref 140–450)
PLATELET # BLD AUTO: 275 10*3/MM3 (ref 140–450)
PMV BLD AUTO: 10.6 FL (ref 6–12)
PMV BLD AUTO: 11 FL (ref 6–12)
PMV BLD AUTO: 11.8 FL (ref 6–12)
PMV BLD AUTO: 12.4 FL (ref 6–12)
PMV BLD AUTO: 12.5 FL (ref 6–12)
POTASSIUM SERPL-SCNC: 3.7 MMOL/L (ref 3.5–5.2)
POTASSIUM SERPL-SCNC: 3.9 MMOL/L (ref 3.5–5.2)
POTASSIUM SERPL-SCNC: 4.7 MMOL/L (ref 3.5–5.2)
POTASSIUM SERPL-SCNC: 4.9 MMOL/L (ref 3.5–5.2)
POTASSIUM SERPL-SCNC: 5 MMOL/L (ref 3.5–5.2)
PROT SERPL-MCNC: 6.2 G/DL (ref 6–8.5)
PROT SERPL-MCNC: 7.5 G/DL (ref 6–8.5)
PROT UR QL STRIP: ABNORMAL
PROTHROMBIN TIME: 12.7 SECONDS (ref 11.9–14.6)
QT INTERVAL: 396 MS
QT INTERVAL: 396 MS
QT INTERVAL: 466 MS
QTC INTERVAL: 448 MS
QTC INTERVAL: 460 MS
QTC INTERVAL: 488 MS
RBC # BLD AUTO: 3.84 10*6/MM3 (ref 3.77–5.28)
RBC # BLD AUTO: 3.94 10*6/MM3 (ref 3.77–5.28)
RBC # BLD AUTO: 4.13 10*6/MM3 (ref 3.77–5.28)
RBC # BLD AUTO: 4.31 10*6/MM3 (ref 3.77–5.28)
RBC # BLD AUTO: 4.37 10*6/MM3 (ref 3.77–5.28)
RBC # UR: ABNORMAL /HPF
REF LAB TEST METHOD: ABNORMAL
SARS-COV-2 RNA PNL SPEC NAA+PROBE: NOT DETECTED
SARS-COV-2 RNA PNL SPEC NAA+PROBE: NOT DETECTED
SODIUM SERPL-SCNC: 137 MMOL/L (ref 136–145)
SODIUM SERPL-SCNC: 137 MMOL/L (ref 136–145)
SODIUM SERPL-SCNC: 138 MMOL/L (ref 136–145)
SODIUM SERPL-SCNC: 139 MMOL/L (ref 136–145)
SODIUM SERPL-SCNC: 140 MMOL/L (ref 136–145)
SP GR UR STRIP: 1.02 (ref 1–1.03)
SQUAMOUS #/AREA URNS HPF: ABNORMAL /HPF
STRESS TARGET HR: 118 BPM
T4 FREE SERPL-MCNC: 1.45 NG/DL (ref 0.93–1.7)
THEOPHYLLINE SERPL-MCNC: 13.2 MCG/ML (ref 10–20)
TIBC SERPL-MCNC: 250 MCG/DL (ref 298–536)
TRANSFERRIN SERPL-MCNC: 168 MG/DL (ref 200–360)
TRIGL SERPL-MCNC: 118 MG/DL (ref 0–150)
TROPONIN T SERPL-MCNC: 0.02 NG/ML (ref 0–0.03)
TROPONIN T SERPL-MCNC: 0.04 NG/ML (ref 0–0.03)
TROPONIN T SERPL-MCNC: 0.05 NG/ML (ref 0–0.03)
TSH SERPL DL<=0.05 MIU/L-ACNC: 2.44 UIU/ML (ref 0.27–4.2)
UREASE TISS QL: NEGATIVE
UROBILINOGEN UR QL STRIP: ABNORMAL
VIT B12 BLD-MCNC: 715 PG/ML (ref 211–946)
VLDLC SERPL-MCNC: 21 MG/DL (ref 5–40)
WBC # BLD AUTO: 3.99 10*3/MM3 (ref 3.4–10.8)
WBC # BLD AUTO: 5.33 10*3/MM3 (ref 3.4–10.8)
WBC # BLD AUTO: 5.41 10*3/MM3 (ref 3.4–10.8)
WBC # BLD AUTO: 5.69 10*3/MM3 (ref 3.4–10.8)
WBC # BLD AUTO: 6.1 10*3/MM3 (ref 3.4–10.8)
WBC UR QL AUTO: ABNORMAL /HPF
WHOLE BLOOD HOLD SPECIMEN: NORMAL
YEAST URNS QL MICRO: ABNORMAL /HPF

## 2021-01-01 PROCEDURE — G0378 HOSPITAL OBSERVATION PER HR: HCPCS

## 2021-01-01 PROCEDURE — 84466 ASSAY OF TRANSFERRIN: CPT | Performed by: FAMILY MEDICINE

## 2021-01-01 PROCEDURE — 73700 CT LOWER EXTREMITY W/O DYE: CPT

## 2021-01-01 PROCEDURE — 93010 ELECTROCARDIOGRAM REPORT: CPT | Performed by: INTERNAL MEDICINE

## 2021-01-01 PROCEDURE — 72148 MRI LUMBAR SPINE W/O DYE: CPT

## 2021-01-01 PROCEDURE — 43239 EGD BIOPSY SINGLE/MULTIPLE: CPT | Performed by: INTERNAL MEDICINE

## 2021-01-01 PROCEDURE — 99204 OFFICE O/P NEW MOD 45 MIN: CPT | Performed by: SURGERY

## 2021-01-01 PROCEDURE — 83735 ASSAY OF MAGNESIUM: CPT | Performed by: INTERNAL MEDICINE

## 2021-01-01 PROCEDURE — 84443 ASSAY THYROID STIM HORMONE: CPT | Performed by: INTERNAL MEDICINE

## 2021-01-01 PROCEDURE — 0DD68ZX EXTRACTION OF STOMACH, VIA NATURAL OR ARTIFICIAL OPENING ENDOSCOPIC, DIAGNOSTIC: ICD-10-PCS | Performed by: INTERNAL MEDICINE

## 2021-01-01 PROCEDURE — 81001 URINALYSIS AUTO W/SCOPE: CPT | Performed by: INTERNAL MEDICINE

## 2021-01-01 PROCEDURE — 93005 ELECTROCARDIOGRAM TRACING: CPT | Performed by: INTERNAL MEDICINE

## 2021-01-01 PROCEDURE — 99238 HOSP IP/OBS DSCHRG MGMT 30/<: CPT | Performed by: FAMILY MEDICINE

## 2021-01-01 PROCEDURE — 82962 GLUCOSE BLOOD TEST: CPT

## 2021-01-01 PROCEDURE — 99232 SBSQ HOSP IP/OBS MODERATE 35: CPT | Performed by: FAMILY MEDICINE

## 2021-01-01 PROCEDURE — 80048 BASIC METABOLIC PNL TOTAL CA: CPT | Performed by: EMERGENCY MEDICINE

## 2021-01-01 PROCEDURE — 87081 CULTURE SCREEN ONLY: CPT | Performed by: INTERNAL MEDICINE

## 2021-01-01 PROCEDURE — 0DD98ZX EXTRACTION OF DUODENUM, VIA NATURAL OR ARTIFICIAL OPENING ENDOSCOPIC, DIAGNOSTIC: ICD-10-PCS | Performed by: INTERNAL MEDICINE

## 2021-01-01 PROCEDURE — 70553 MRI BRAIN STEM W/O & W/DYE: CPT

## 2021-01-01 PROCEDURE — 72131 CT LUMBAR SPINE W/O DYE: CPT

## 2021-01-01 PROCEDURE — 97161 PT EVAL LOW COMPLEX 20 MIN: CPT

## 2021-01-01 PROCEDURE — 85027 COMPLETE CBC AUTOMATED: CPT | Performed by: INTERNAL MEDICINE

## 2021-01-01 PROCEDURE — 93880 EXTRACRANIAL BILAT STUDY: CPT | Performed by: SURGERY

## 2021-01-01 PROCEDURE — 71045 X-RAY EXAM CHEST 1 VIEW: CPT

## 2021-01-01 PROCEDURE — 85025 COMPLETE CBC W/AUTO DIFF WBC: CPT | Performed by: INTERNAL MEDICINE

## 2021-01-01 PROCEDURE — 82140 ASSAY OF AMMONIA: CPT | Performed by: INTERNAL MEDICINE

## 2021-01-01 PROCEDURE — 84484 ASSAY OF TROPONIN QUANT: CPT | Performed by: INTERNAL MEDICINE

## 2021-01-01 PROCEDURE — A9577 INJ MULTIHANCE: HCPCS | Performed by: INTERNAL MEDICINE

## 2021-01-01 PROCEDURE — 0DD78ZX EXTRACTION OF STOMACH, PYLORUS, VIA NATURAL OR ARTIFICIAL OPENING ENDOSCOPIC, DIAGNOSTIC: ICD-10-PCS | Performed by: INTERNAL MEDICINE

## 2021-01-01 PROCEDURE — 85025 COMPLETE CBC W/AUTO DIFF WBC: CPT | Performed by: EMERGENCY MEDICINE

## 2021-01-01 PROCEDURE — 99221 1ST HOSP IP/OBS SF/LOW 40: CPT | Performed by: NEUROLOGICAL SURGERY

## 2021-01-01 PROCEDURE — 99284 EMERGENCY DEPT VISIT MOD MDM: CPT

## 2021-01-01 PROCEDURE — 25010000002 IOPAMIDOL 61 % SOLUTION: Performed by: EMERGENCY MEDICINE

## 2021-01-01 PROCEDURE — 80053 COMPREHEN METABOLIC PANEL: CPT | Performed by: INTERNAL MEDICINE

## 2021-01-01 PROCEDURE — 87635 SARS-COV-2 COVID-19 AMP PRB: CPT | Performed by: EMERGENCY MEDICINE

## 2021-01-01 PROCEDURE — 93880 EXTRACRANIAL BILAT STUDY: CPT

## 2021-01-01 PROCEDURE — 84484 ASSAY OF TROPONIN QUANT: CPT | Performed by: EMERGENCY MEDICINE

## 2021-01-01 PROCEDURE — 25010000002 ONDANSETRON PER 1 MG: Performed by: INTERNAL MEDICINE

## 2021-01-01 PROCEDURE — 36415 COLL VENOUS BLD VENIPUNCTURE: CPT

## 2021-01-01 PROCEDURE — 82150 ASSAY OF AMYLASE: CPT | Performed by: INTERNAL MEDICINE

## 2021-01-01 PROCEDURE — 99213 OFFICE O/P EST LOW 20 MIN: CPT | Performed by: CLINICAL NURSE SPECIALIST

## 2021-01-01 PROCEDURE — 99212 OFFICE O/P EST SF 10 MIN: CPT | Performed by: CLINICAL NURSE SPECIALIST

## 2021-01-01 PROCEDURE — 96375 TX/PRO/DX INJ NEW DRUG ADDON: CPT

## 2021-01-01 PROCEDURE — 80198 ASSAY OF THEOPHYLLINE: CPT | Performed by: INTERNAL MEDICINE

## 2021-01-01 PROCEDURE — 93306 TTE W/DOPPLER COMPLETE: CPT

## 2021-01-01 PROCEDURE — 74176 CT ABD & PELVIS W/O CONTRAST: CPT

## 2021-01-01 PROCEDURE — 72114 X-RAY EXAM L-S SPINE BENDING: CPT

## 2021-01-01 PROCEDURE — 25010000002 MORPHINE SULFATE (PF) 2 MG/ML SOLUTION: Performed by: INTERNAL MEDICINE

## 2021-01-01 PROCEDURE — 83540 ASSAY OF IRON: CPT | Performed by: FAMILY MEDICINE

## 2021-01-01 PROCEDURE — 74177 CT ABD & PELVIS W/CONTRAST: CPT

## 2021-01-01 PROCEDURE — 25010000002 ONDANSETRON PER 1 MG: Performed by: EMERGENCY MEDICINE

## 2021-01-01 PROCEDURE — 25010000002 KETOROLAC TROMETHAMINE PER 15 MG: Performed by: INTERNAL MEDICINE

## 2021-01-01 PROCEDURE — 0DD48ZX EXTRACTION OF ESOPHAGOGASTRIC JUNCTION, VIA NATURAL OR ARTIFICIAL OPENING ENDOSCOPIC, DIAGNOSTIC: ICD-10-PCS | Performed by: INTERNAL MEDICINE

## 2021-01-01 PROCEDURE — 70450 CT HEAD/BRAIN W/O DYE: CPT

## 2021-01-01 PROCEDURE — 99283 EMERGENCY DEPT VISIT LOW MDM: CPT

## 2021-01-01 PROCEDURE — 83036 HEMOGLOBIN GLYCOSYLATED A1C: CPT | Performed by: INTERNAL MEDICINE

## 2021-01-01 PROCEDURE — 84100 ASSAY OF PHOSPHORUS: CPT | Performed by: FAMILY MEDICINE

## 2021-01-01 PROCEDURE — 83735 ASSAY OF MAGNESIUM: CPT | Performed by: CLINICAL NURSE SPECIALIST

## 2021-01-01 PROCEDURE — 85610 PROTHROMBIN TIME: CPT | Performed by: EMERGENCY MEDICINE

## 2021-01-01 PROCEDURE — 73502 X-RAY EXAM HIP UNI 2-3 VIEWS: CPT

## 2021-01-01 PROCEDURE — 84100 ASSAY OF PHOSPHORUS: CPT | Performed by: INTERNAL MEDICINE

## 2021-01-01 PROCEDURE — 85730 THROMBOPLASTIN TIME PARTIAL: CPT | Performed by: EMERGENCY MEDICINE

## 2021-01-01 PROCEDURE — 93005 ELECTROCARDIOGRAM TRACING: CPT | Performed by: EMERGENCY MEDICINE

## 2021-01-01 PROCEDURE — 0 GADOBENATE DIMEGLUMINE 529 MG/ML SOLUTION: Performed by: INTERNAL MEDICINE

## 2021-01-01 PROCEDURE — 25010000002 METHYLPREDNISOLONE PER 40 MG: Performed by: INTERNAL MEDICINE

## 2021-01-01 PROCEDURE — 97166 OT EVAL MOD COMPLEX 45 MIN: CPT

## 2021-01-01 PROCEDURE — 99204 OFFICE O/P NEW MOD 45 MIN: CPT | Performed by: INTERNAL MEDICINE

## 2021-01-01 PROCEDURE — 99285 EMERGENCY DEPT VISIT HI MDM: CPT

## 2021-01-01 PROCEDURE — 84439 ASSAY OF FREE THYROXINE: CPT | Performed by: INTERNAL MEDICINE

## 2021-01-01 PROCEDURE — 88305 TISSUE EXAM BY PATHOLOGIST: CPT | Performed by: INTERNAL MEDICINE

## 2021-01-01 PROCEDURE — 87635 SARS-COV-2 COVID-19 AMP PRB: CPT | Performed by: INTERNAL MEDICINE

## 2021-01-01 PROCEDURE — 99203 OFFICE O/P NEW LOW 30 MIN: CPT | Performed by: CLINICAL NURSE SPECIALIST

## 2021-01-01 PROCEDURE — 99214 OFFICE O/P EST MOD 30 MIN: CPT | Performed by: CLINICAL NURSE SPECIALIST

## 2021-01-01 PROCEDURE — 25010000002 MORPHINE PER 10 MG: Performed by: EMERGENCY MEDICINE

## 2021-01-01 PROCEDURE — 96374 THER/PROPH/DIAG INJ IV PUSH: CPT

## 2021-01-01 PROCEDURE — 93306 TTE W/DOPPLER COMPLETE: CPT | Performed by: INTERNAL MEDICINE

## 2021-01-01 PROCEDURE — 83690 ASSAY OF LIPASE: CPT | Performed by: INTERNAL MEDICINE

## 2021-01-01 PROCEDURE — 82607 VITAMIN B-12: CPT | Performed by: CLINICAL NURSE SPECIALIST

## 2021-01-01 PROCEDURE — 80061 LIPID PANEL: CPT | Performed by: INTERNAL MEDICINE

## 2021-01-01 PROCEDURE — 25010000002 PROPOFOL 10 MG/ML EMULSION: Performed by: NURSE ANESTHETIST, CERTIFIED REGISTERED

## 2021-01-01 PROCEDURE — 80048 BASIC METABOLIC PNL TOTAL CA: CPT | Performed by: INTERNAL MEDICINE

## 2021-01-01 PROCEDURE — C9803 HOPD COVID-19 SPEC COLLECT: HCPCS

## 2021-01-01 RX ORDER — POLYETHYLENE GLYCOL 3350 17 G/17G
17 POWDER, FOR SOLUTION ORAL DAILY
Qty: 30 PACKET | Refills: 1 | Status: SHIPPED | OUTPATIENT
Start: 2021-01-01

## 2021-01-01 RX ORDER — LEVOTHYROXINE SODIUM 0.05 MG/1
50 TABLET ORAL
Status: DISCONTINUED | OUTPATIENT
Start: 2021-01-01 | End: 2021-01-01 | Stop reason: HOSPADM

## 2021-01-01 RX ORDER — ALLOPURINOL 300 MG/1
300 TABLET ORAL DAILY
Status: DISCONTINUED | OUTPATIENT
Start: 2021-01-01 | End: 2021-01-01 | Stop reason: HOSPADM

## 2021-01-01 RX ORDER — ASPIRIN 81 MG/1
81 TABLET, CHEWABLE ORAL DAILY
COMMUNITY

## 2021-01-01 RX ORDER — SODIUM CHLORIDE 0.9 % (FLUSH) 0.9 %
10 SYRINGE (ML) INJECTION EVERY 12 HOURS SCHEDULED
Status: DISCONTINUED | OUTPATIENT
Start: 2021-01-01 | End: 2021-01-01 | Stop reason: HOSPADM

## 2021-01-01 RX ORDER — ALBUTEROL SULFATE 1.25 MG/3ML
1 SOLUTION RESPIRATORY (INHALATION) EVERY 6 HOURS PRN
COMMUNITY

## 2021-01-01 RX ORDER — SODIUM CHLORIDE 0.9 % (FLUSH) 0.9 %
10 SYRINGE (ML) INJECTION AS NEEDED
Status: DISCONTINUED | OUTPATIENT
Start: 2021-01-01 | End: 2021-01-01 | Stop reason: HOSPADM

## 2021-01-01 RX ORDER — ROSUVASTATIN CALCIUM 20 MG/1
40 TABLET, COATED ORAL DAILY
Status: DISCONTINUED | OUTPATIENT
Start: 2021-01-01 | End: 2021-01-01

## 2021-01-01 RX ORDER — ACETAMINOPHEN 325 MG/1
650 TABLET ORAL EVERY 4 HOURS PRN
Status: DISCONTINUED | OUTPATIENT
Start: 2021-01-01 | End: 2021-01-01 | Stop reason: HOSPADM

## 2021-01-01 RX ORDER — CYCLOBENZAPRINE HCL 5 MG
5 TABLET ORAL 3 TIMES DAILY PRN
Status: DISCONTINUED | OUTPATIENT
Start: 2021-01-01 | End: 2021-01-01 | Stop reason: HOSPADM

## 2021-01-01 RX ORDER — ATORVASTATIN CALCIUM 10 MG/1
10 TABLET, FILM COATED ORAL NIGHTLY
Status: DISCONTINUED | OUTPATIENT
Start: 2021-01-01 | End: 2021-01-01 | Stop reason: HOSPADM

## 2021-01-01 RX ORDER — SODIUM CHLORIDE 9 MG/ML
100 INJECTION, SOLUTION INTRAVENOUS CONTINUOUS
Status: DISCONTINUED | OUTPATIENT
Start: 2021-01-01 | End: 2021-01-01

## 2021-01-01 RX ORDER — PROMETHAZINE HYDROCHLORIDE AND CODEINE PHOSPHATE 6.25; 1 MG/5ML; MG/5ML
5-10 SYRUP ORAL EVERY 4 HOURS PRN
COMMUNITY
End: 2021-01-01 | Stop reason: HOSPADM

## 2021-01-01 RX ORDER — PROPOFOL 10 MG/ML
VIAL (ML) INTRAVENOUS AS NEEDED
Status: DISCONTINUED | OUTPATIENT
Start: 2021-01-01 | End: 2021-01-01 | Stop reason: SURG

## 2021-01-01 RX ORDER — LISINOPRIL 20 MG/1
20 TABLET ORAL 2 TIMES DAILY
COMMUNITY

## 2021-01-01 RX ORDER — LEVALBUTEROL TARTRATE 45 UG/1
2 AEROSOL, METERED ORAL EVERY 4 HOURS PRN
COMMUNITY

## 2021-01-01 RX ORDER — ALBUTEROL SULFATE 1.25 MG/3ML
1 SOLUTION RESPIRATORY (INHALATION) EVERY 6 HOURS PRN
Status: DISCONTINUED | OUTPATIENT
Start: 2021-01-01 | End: 2021-01-01 | Stop reason: ALTCHOICE

## 2021-01-01 RX ORDER — SODIUM CHLORIDE 9 MG/ML
50 INJECTION, SOLUTION INTRAVENOUS CONTINUOUS
Status: CANCELLED | OUTPATIENT
Start: 2021-01-01

## 2021-01-01 RX ORDER — INDAPAMIDE 1.25 MG/1
1.25 TABLET, FILM COATED ORAL DAILY
Status: DISCONTINUED | OUTPATIENT
Start: 2021-01-01 | End: 2021-01-01 | Stop reason: HOSPADM

## 2021-01-01 RX ORDER — MORPHINE SULFATE 2 MG/ML
2 INJECTION, SOLUTION INTRAMUSCULAR; INTRAVENOUS EVERY 4 HOURS PRN
Status: DISCONTINUED | OUTPATIENT
Start: 2021-01-01 | End: 2021-01-01

## 2021-01-01 RX ORDER — DOXYCYCLINE HYCLATE 100 MG/1
100 CAPSULE ORAL DAILY
COMMUNITY
End: 2021-01-01 | Stop reason: HOSPADM

## 2021-01-01 RX ORDER — ONDANSETRON 2 MG/ML
4 INJECTION INTRAMUSCULAR; INTRAVENOUS EVERY 6 HOURS PRN
Status: DISCONTINUED | OUTPATIENT
Start: 2021-01-01 | End: 2021-01-01 | Stop reason: HOSPADM

## 2021-01-01 RX ORDER — ONDANSETRON 4 MG/1
4 TABLET, FILM COATED ORAL EVERY 6 HOURS PRN
Status: DISCONTINUED | OUTPATIENT
Start: 2021-01-01 | End: 2021-01-01 | Stop reason: HOSPADM

## 2021-01-01 RX ORDER — ASPIRIN 81 MG/1
81 TABLET, CHEWABLE ORAL DAILY
Status: DISCONTINUED | OUTPATIENT
Start: 2021-01-01 | End: 2021-01-01 | Stop reason: HOSPADM

## 2021-01-01 RX ORDER — AMLODIPINE BESYLATE 10 MG/1
10 TABLET ORAL
Status: DISCONTINUED | OUTPATIENT
Start: 2021-01-01 | End: 2021-01-01 | Stop reason: HOSPADM

## 2021-01-01 RX ORDER — ALPRAZOLAM 0.25 MG/1
0.25 TABLET ORAL NIGHTLY PRN
Status: DISCONTINUED | OUTPATIENT
Start: 2021-01-01 | End: 2021-01-01 | Stop reason: HOSPADM

## 2021-01-01 RX ORDER — ROSUVASTATIN CALCIUM 40 MG/1
40 TABLET, COATED ORAL DAILY
COMMUNITY
End: 2021-01-01 | Stop reason: HOSPADM

## 2021-01-01 RX ORDER — ALPRAZOLAM 0.25 MG/1
0.25 TABLET ORAL NIGHTLY PRN
COMMUNITY

## 2021-01-01 RX ORDER — SODIUM CHLORIDE 9 MG/ML
50 INJECTION, SOLUTION INTRAVENOUS CONTINUOUS
Status: DISCONTINUED | OUTPATIENT
Start: 2021-01-01 | End: 2021-01-01 | Stop reason: HOSPADM

## 2021-01-01 RX ORDER — ASCORBIC ACID 500 MG
500 TABLET ORAL 2 TIMES DAILY
COMMUNITY
End: 2021-01-01 | Stop reason: HOSPADM

## 2021-01-01 RX ORDER — PANTOPRAZOLE SODIUM 40 MG/10ML
40 INJECTION, POWDER, LYOPHILIZED, FOR SOLUTION INTRAVENOUS ONCE
Status: COMPLETED | OUTPATIENT
Start: 2021-01-01 | End: 2021-01-01

## 2021-01-01 RX ORDER — OXYCODONE HYDROCHLORIDE AND ACETAMINOPHEN 5; 325 MG/1; MG/1
1 TABLET ORAL EVERY 6 HOURS PRN
Qty: 12 TABLET | Refills: 0 | Status: SHIPPED | OUTPATIENT
Start: 2021-01-01

## 2021-01-01 RX ORDER — ATORVASTATIN CALCIUM 10 MG/1
10 TABLET, FILM COATED ORAL NIGHTLY
Qty: 30 TABLET | Refills: 0 | Status: SHIPPED | OUTPATIENT
Start: 2021-01-01 | End: 2021-01-01

## 2021-01-01 RX ORDER — OXYCODONE HYDROCHLORIDE AND ACETAMINOPHEN 5; 325 MG/1; MG/1
1 TABLET ORAL EVERY 6 HOURS PRN
Qty: 10 TABLET | Refills: 0 | Status: ON HOLD | OUTPATIENT
Start: 2021-01-01 | End: 2021-01-01 | Stop reason: SDUPTHER

## 2021-01-01 RX ORDER — POLYETHYLENE GLYCOL 3350 17 G/17G
17 POWDER, FOR SOLUTION ORAL DAILY
Status: DISCONTINUED | OUTPATIENT
Start: 2021-01-01 | End: 2021-01-01 | Stop reason: HOSPADM

## 2021-01-01 RX ORDER — ASPIRIN 81 MG/1
324 TABLET, CHEWABLE ORAL ONCE
Status: COMPLETED | OUTPATIENT
Start: 2021-01-01 | End: 2021-01-01

## 2021-01-01 RX ORDER — PROMETHAZINE HYDROCHLORIDE 6.25 MG/5ML
SYRUP ORAL 4 TIMES DAILY PRN
Status: ON HOLD | COMMUNITY
End: 2021-01-01

## 2021-01-01 RX ORDER — OXYCODONE HYDROCHLORIDE AND ACETAMINOPHEN 5; 325 MG/1; MG/1
1 TABLET ORAL EVERY 4 HOURS PRN
Status: DISCONTINUED | OUTPATIENT
Start: 2021-01-01 | End: 2021-01-01 | Stop reason: HOSPADM

## 2021-01-01 RX ORDER — ALBUTEROL SULFATE 1.25 MG/3ML
1.25 SOLUTION RESPIRATORY (INHALATION) EVERY 6 HOURS PRN
Status: DISCONTINUED | OUTPATIENT
Start: 2021-01-01 | End: 2021-01-01 | Stop reason: HOSPADM

## 2021-01-01 RX ORDER — ATORVASTATIN CALCIUM 40 MG/1
80 TABLET, FILM COATED ORAL NIGHTLY
Status: DISCONTINUED | OUTPATIENT
Start: 2021-01-01 | End: 2021-01-01

## 2021-01-01 RX ORDER — BUTALBITAL, ACETAMINOPHEN AND CAFFEINE 50; 325; 40 MG/1; MG/1; MG/1
1 CAPSULE ORAL EVERY 4 HOURS PRN
COMMUNITY

## 2021-01-01 RX ORDER — HYDROCODONE BITARTRATE AND ACETAMINOPHEN 5; 325 MG/1; MG/1
1 TABLET ORAL EVERY 6 HOURS PRN
Status: DISCONTINUED | OUTPATIENT
Start: 2021-01-01 | End: 2021-01-01

## 2021-01-01 RX ORDER — DICYCLOMINE HYDROCHLORIDE 10 MG/1
10 CAPSULE ORAL 3 TIMES DAILY PRN
COMMUNITY
End: 2021-01-01 | Stop reason: HOSPADM

## 2021-01-01 RX ORDER — MONTELUKAST SODIUM 10 MG/1
10 TABLET ORAL NIGHTLY
Status: DISCONTINUED | OUTPATIENT
Start: 2021-01-01 | End: 2021-01-01 | Stop reason: HOSPADM

## 2021-01-01 RX ORDER — ONDANSETRON 2 MG/ML
4 INJECTION INTRAMUSCULAR; INTRAVENOUS ONCE
Status: COMPLETED | OUTPATIENT
Start: 2021-01-01 | End: 2021-01-01

## 2021-01-01 RX ORDER — LISINOPRIL 20 MG/1
20 TABLET ORAL EVERY 12 HOURS SCHEDULED
Status: DISCONTINUED | OUTPATIENT
Start: 2021-01-01 | End: 2021-01-01 | Stop reason: HOSPADM

## 2021-01-01 RX ORDER — LIDOCAINE 50 MG/G
1 PATCH TOPICAL EVERY 24 HOURS
Qty: 30 PATCH | Refills: 2 | Status: SHIPPED | OUTPATIENT
Start: 2021-01-01

## 2021-01-01 RX ORDER — LEVOTHYROXINE SODIUM 0.05 MG/1
50 TABLET ORAL DAILY
COMMUNITY

## 2021-01-01 RX ORDER — SUCRALFATE 1 G/1
1 TABLET ORAL
Status: DISCONTINUED | OUTPATIENT
Start: 2021-01-01 | End: 2021-01-01 | Stop reason: HOSPADM

## 2021-01-01 RX ORDER — METHYLPREDNISOLONE SODIUM SUCCINATE 40 MG/ML
40 INJECTION, POWDER, LYOPHILIZED, FOR SOLUTION INTRAMUSCULAR; INTRAVENOUS ONCE
Status: COMPLETED | OUTPATIENT
Start: 2021-01-01 | End: 2021-01-01

## 2021-01-01 RX ORDER — SUCRALFATE 1 G/1
1 TABLET ORAL
Qty: 90 TABLET | Refills: 2 | Status: SHIPPED | OUTPATIENT
Start: 2021-01-01

## 2021-01-01 RX ORDER — PANTOPRAZOLE SODIUM 40 MG/1
40 TABLET, DELAYED RELEASE ORAL DAILY
Qty: 30 TABLET | Refills: 2 | Status: SHIPPED | OUTPATIENT
Start: 2021-01-01

## 2021-01-01 RX ORDER — LEVALBUTEROL TARTRATE 45 UG/1
2 AEROSOL, METERED ORAL EVERY 4 HOURS PRN
Status: DISCONTINUED | OUTPATIENT
Start: 2021-01-01 | End: 2021-01-01 | Stop reason: HOSPADM

## 2021-01-01 RX ORDER — SUCRALFATE 1 G/1
1 TABLET ORAL
Qty: 90 TABLET | Refills: 2 | Status: SHIPPED | OUTPATIENT
Start: 2021-01-01 | End: 2021-01-01 | Stop reason: SDUPTHER

## 2021-01-01 RX ORDER — PANTOPRAZOLE SODIUM 40 MG/1
40 TABLET, DELAYED RELEASE ORAL DAILY
Status: DISCONTINUED | OUTPATIENT
Start: 2021-01-01 | End: 2021-01-01 | Stop reason: HOSPADM

## 2021-01-01 RX ORDER — ASPIRIN 81 MG/1
81 TABLET ORAL DAILY
Status: DISCONTINUED | OUTPATIENT
Start: 2021-01-01 | End: 2021-01-01 | Stop reason: HOSPADM

## 2021-01-01 RX ORDER — AMOXICILLIN 250 MG
1 CAPSULE ORAL 2 TIMES DAILY
Status: DISCONTINUED | OUTPATIENT
Start: 2021-01-01 | End: 2021-01-01 | Stop reason: HOSPADM

## 2021-01-01 RX ORDER — MIDAZOLAM HYDROCHLORIDE 1 MG/ML
0.5 INJECTION INTRAMUSCULAR; INTRAVENOUS
Status: DISCONTINUED | OUTPATIENT
Start: 2021-01-01 | End: 2021-01-01 | Stop reason: HOSPADM

## 2021-01-01 RX ORDER — ALPRAZOLAM 0.25 MG/1
0.25 TABLET ORAL EVERY 8 HOURS PRN
Status: DISCONTINUED | OUTPATIENT
Start: 2021-01-01 | End: 2021-01-01 | Stop reason: HOSPADM

## 2021-01-01 RX ORDER — ASPIRIN 325 MG
325 TABLET ORAL ONCE
Status: DISCONTINUED | OUTPATIENT
Start: 2021-01-01 | End: 2021-01-01

## 2021-01-01 RX ORDER — BUTALBITAL, ACETAMINOPHEN AND CAFFEINE 50; 325; 40 MG/1; MG/1; MG/1
1 TABLET ORAL EVERY 6 HOURS PRN
Status: DISCONTINUED | OUTPATIENT
Start: 2021-01-01 | End: 2021-01-01 | Stop reason: HOSPADM

## 2021-01-01 RX ORDER — ACETAMINOPHEN 160 MG/5ML
650 SOLUTION ORAL EVERY 4 HOURS PRN
Status: DISCONTINUED | OUTPATIENT
Start: 2021-01-01 | End: 2021-01-01 | Stop reason: HOSPADM

## 2021-01-01 RX ORDER — PANTOPRAZOLE SODIUM 40 MG/1
40 TABLET, DELAYED RELEASE ORAL DAILY
Qty: 90 TABLET | Refills: 2 | Status: SHIPPED | OUTPATIENT
Start: 2021-01-01 | End: 2021-01-01

## 2021-01-01 RX ORDER — OMEPRAZOLE 20 MG/1
20 CAPSULE, DELAYED RELEASE ORAL 2 TIMES DAILY
COMMUNITY
End: 2021-01-01 | Stop reason: HOSPADM

## 2021-01-01 RX ORDER — ACETAMINOPHEN 650 MG/1
650 SUPPOSITORY RECTAL EVERY 4 HOURS PRN
Status: DISCONTINUED | OUTPATIENT
Start: 2021-01-01 | End: 2021-01-01 | Stop reason: HOSPADM

## 2021-01-01 RX ORDER — NALOXONE HCL 0.4 MG/ML
0.4 VIAL (ML) INJECTION
Status: DISCONTINUED | OUTPATIENT
Start: 2021-01-01 | End: 2021-01-01

## 2021-01-01 RX ORDER — AMLODIPINE BESYLATE 10 MG/1
10 TABLET ORAL
Qty: 30 TABLET | Refills: 2 | Status: SHIPPED | OUTPATIENT
Start: 2021-01-01

## 2021-01-01 RX ORDER — FERROUS SULFATE 325(65) MG
325 TABLET ORAL 2 TIMES DAILY
COMMUNITY
End: 2021-01-01 | Stop reason: HOSPADM

## 2021-01-01 RX ORDER — MONTELUKAST SODIUM 10 MG/1
10 TABLET ORAL NIGHTLY
COMMUNITY

## 2021-01-01 RX ORDER — LIDOCAINE HYDROCHLORIDE 20 MG/ML
INJECTION, SOLUTION EPIDURAL; INFILTRATION; INTRACAUDAL; PERINEURAL AS NEEDED
Status: DISCONTINUED | OUTPATIENT
Start: 2021-01-01 | End: 2021-01-01 | Stop reason: SURG

## 2021-01-01 RX ORDER — LIDOCAINE 50 MG/G
1 PATCH TOPICAL EVERY 24 HOURS
Qty: 90 EACH | Refills: 2 | Status: SHIPPED | OUTPATIENT
Start: 2021-01-01 | End: 2021-01-01 | Stop reason: HOSPADM

## 2021-01-01 RX ORDER — INDAPAMIDE 1.25 MG/1
1.25 TABLET, FILM COATED ORAL DAILY
COMMUNITY

## 2021-01-01 RX ORDER — OXYCODONE HYDROCHLORIDE AND ACETAMINOPHEN 5; 325 MG/1; MG/1
1 TABLET ORAL EVERY 6 HOURS PRN
Status: DISCONTINUED | OUTPATIENT
Start: 2021-01-01 | End: 2021-01-01 | Stop reason: HOSPADM

## 2021-01-01 RX ORDER — MIDAZOLAM HYDROCHLORIDE 1 MG/ML
1 INJECTION INTRAMUSCULAR; INTRAVENOUS
Status: DISCONTINUED | OUTPATIENT
Start: 2021-01-01 | End: 2021-01-01 | Stop reason: HOSPADM

## 2021-01-01 RX ORDER — KETOROLAC TROMETHAMINE 15 MG/ML
15 INJECTION, SOLUTION INTRAMUSCULAR; INTRAVENOUS ONCE
Status: COMPLETED | OUTPATIENT
Start: 2021-01-01 | End: 2021-01-01

## 2021-01-01 RX ORDER — LISINOPRIL 20 MG/1
20 TABLET ORAL 2 TIMES DAILY
Status: DISCONTINUED | OUTPATIENT
Start: 2021-01-01 | End: 2021-01-01 | Stop reason: HOSPADM

## 2021-01-01 RX ORDER — THEOPHYLLINE 600 MG/1
300 TABLET, EXTENDED RELEASE ORAL 2 TIMES DAILY
COMMUNITY

## 2021-01-01 RX ORDER — ALLOPURINOL 300 MG/1
300 TABLET ORAL DAILY
COMMUNITY

## 2021-01-01 RX ORDER — MELOXICAM 7.5 MG/1
7.5 TABLET ORAL DAILY
Qty: 30 TABLET | Refills: 2 | Status: SHIPPED | OUTPATIENT
Start: 2021-01-01

## 2021-01-01 RX ORDER — INDAPAMIDE 1.25 MG/1
2.5 TABLET, FILM COATED ORAL DAILY
Status: DISCONTINUED | OUTPATIENT
Start: 2021-01-01 | End: 2021-01-01 | Stop reason: HOSPADM

## 2021-01-01 RX ORDER — ROSUVASTATIN CALCIUM 10 MG/1
10 TABLET, COATED ORAL NIGHTLY
Status: DISCONTINUED | OUTPATIENT
Start: 2021-01-01 | End: 2021-01-01 | Stop reason: HOSPADM

## 2021-01-01 RX ORDER — PANTOPRAZOLE SODIUM 40 MG/1
40 TABLET, DELAYED RELEASE ORAL
Status: DISCONTINUED | OUTPATIENT
Start: 2021-01-01 | End: 2021-01-01 | Stop reason: HOSPADM

## 2021-01-01 RX ORDER — NAPROXEN 500 MG/1
500 TABLET ORAL 2 TIMES DAILY WITH MEALS
COMMUNITY
End: 2021-01-01 | Stop reason: HOSPADM

## 2021-01-01 RX ORDER — AMLODIPINE BESYLATE 5 MG/1
5 TABLET ORAL
Status: DISCONTINUED | OUTPATIENT
Start: 2021-01-01 | End: 2021-01-01

## 2021-01-01 RX ORDER — MORPHINE SULFATE 10 MG/ML
6 INJECTION INTRAMUSCULAR; INTRAVENOUS; SUBCUTANEOUS ONCE
Status: DISCONTINUED | OUTPATIENT
Start: 2021-01-01 | End: 2021-01-01

## 2021-01-01 RX ORDER — MELOXICAM 7.5 MG/1
7.5 TABLET ORAL DAILY
Status: DISCONTINUED | OUTPATIENT
Start: 2021-01-01 | End: 2021-01-01

## 2021-01-01 RX ADMIN — PANTOPRAZOLE SODIUM 8 MG/HR: 40 INJECTION, POWDER, FOR SOLUTION INTRAVENOUS at 13:07

## 2021-01-01 RX ADMIN — ALLOPURINOL 300 MG: 300 TABLET ORAL at 14:31

## 2021-01-01 RX ADMIN — ONDANSETRON HYDROCHLORIDE 4 MG: 2 SOLUTION INTRAMUSCULAR; INTRAVENOUS at 08:34

## 2021-01-01 RX ADMIN — POLYETHYLENE GLYCOL (3350) 17 G: 17 POWDER, FOR SOLUTION ORAL at 15:19

## 2021-01-01 RX ADMIN — PANTOPRAZOLE SODIUM 8 MG/HR: 40 INJECTION, POWDER, FOR SOLUTION INTRAVENOUS at 08:16

## 2021-01-01 RX ADMIN — ROSUVASTATIN CALCIUM 10 MG: 10 TABLET, FILM COATED ORAL at 21:49

## 2021-01-01 RX ADMIN — ALLOPURINOL 300 MG: 300 TABLET ORAL at 08:45

## 2021-01-01 RX ADMIN — LISINOPRIL 20 MG: 20 TABLET ORAL at 20:52

## 2021-01-01 RX ADMIN — SUCRALFATE 1 G: 1 TABLET ORAL at 11:44

## 2021-01-01 RX ADMIN — ALLOPURINOL 300 MG: 300 TABLET ORAL at 08:46

## 2021-01-01 RX ADMIN — DICLOFENAC SODIUM 4 G: 10 GEL TOPICAL at 17:39

## 2021-01-01 RX ADMIN — GADOBENATE DIMEGLUMINE 11 ML: 529 INJECTION, SOLUTION INTRAVENOUS at 10:16

## 2021-01-01 RX ADMIN — SODIUM CHLORIDE 50 ML/HR: 9 INJECTION, SOLUTION INTRAVENOUS at 02:38

## 2021-01-01 RX ADMIN — PANTOPRAZOLE SODIUM 8 MG/HR: 40 INJECTION, POWDER, FOR SOLUTION INTRAVENOUS at 17:36

## 2021-01-01 RX ADMIN — PANTOPRAZOLE SODIUM 40 MG: 40 TABLET, DELAYED RELEASE ORAL at 06:18

## 2021-01-01 RX ADMIN — DICLOFENAC SODIUM 4 G: 10 GEL TOPICAL at 17:27

## 2021-01-01 RX ADMIN — POLYETHYLENE GLYCOL (3350) 17 G: 17 POWDER, FOR SOLUTION ORAL at 08:57

## 2021-01-01 RX ADMIN — INDAPAMIDE 2.5 MG: 1.25 TABLET, FILM COATED ORAL at 08:56

## 2021-01-01 RX ADMIN — DOCUSATE SODIUM 50 MG AND SENNOSIDES 8.6 MG 1 TABLET: 8.6; 5 TABLET, FILM COATED ORAL at 08:21

## 2021-01-01 RX ADMIN — LISINOPRIL 20 MG: 20 TABLET ORAL at 20:20

## 2021-01-01 RX ADMIN — SUCRALFATE 1 G: 1 TABLET ORAL at 11:02

## 2021-01-01 RX ADMIN — PANTOPRAZOLE SODIUM 40 MG: 40 TABLET, DELAYED RELEASE ORAL at 05:30

## 2021-01-01 RX ADMIN — LISINOPRIL 20 MG: 20 TABLET ORAL at 21:49

## 2021-01-01 RX ADMIN — INDAPAMIDE 2.5 MG: 1.25 TABLET, FILM COATED ORAL at 08:21

## 2021-01-01 RX ADMIN — OXYCODONE HYDROCHLORIDE AND ACETAMINOPHEN 1 TABLET: 5; 325 TABLET ORAL at 04:40

## 2021-01-01 RX ADMIN — ASPIRIN 81 MG: 81 TABLET, COATED ORAL at 08:22

## 2021-01-01 RX ADMIN — ALPRAZOLAM 0.25 MG: 0.25 TABLET ORAL at 05:29

## 2021-01-01 RX ADMIN — SODIUM CHLORIDE 50 ML/HR: 9 INJECTION, SOLUTION INTRAVENOUS at 00:07

## 2021-01-01 RX ADMIN — DOCUSATE SODIUM 50 MG AND SENNOSIDES 8.6 MG 1 TABLET: 8.6; 5 TABLET, FILM COATED ORAL at 08:22

## 2021-01-01 RX ADMIN — SODIUM CHLORIDE, PRESERVATIVE FREE 10 ML: 5 INJECTION INTRAVENOUS at 08:19

## 2021-01-01 RX ADMIN — SODIUM CHLORIDE 1000 ML: 9 INJECTION, SOLUTION INTRAVENOUS at 21:13

## 2021-01-01 RX ADMIN — AMLODIPINE BESYLATE 5 MG: 5 TABLET ORAL at 08:56

## 2021-01-01 RX ADMIN — MORPHINE SULFATE 2 MG: 2 INJECTION, SOLUTION INTRAMUSCULAR; INTRAVENOUS at 08:33

## 2021-01-01 RX ADMIN — SODIUM CHLORIDE 100 ML/HR: 9 INJECTION, SOLUTION INTRAVENOUS at 14:21

## 2021-01-01 RX ADMIN — MORPHINE SULFATE 2 MG: 2 INJECTION, SOLUTION INTRAMUSCULAR; INTRAVENOUS at 08:27

## 2021-01-01 RX ADMIN — DOCUSATE SODIUM 50 MG AND SENNOSIDES 8.6 MG 1 TABLET: 8.6; 5 TABLET, FILM COATED ORAL at 20:53

## 2021-01-01 RX ADMIN — SUCRALFATE 1 G: 1 TABLET ORAL at 08:21

## 2021-01-01 RX ADMIN — PANTOPRAZOLE SODIUM 40 MG: 40 INJECTION, POWDER, FOR SOLUTION INTRAVENOUS at 08:19

## 2021-01-01 RX ADMIN — DOCUSATE SODIUM 50 MG AND SENNOSIDES 8.6 MG 1 TABLET: 8.6; 5 TABLET, FILM COATED ORAL at 21:49

## 2021-01-01 RX ADMIN — ALLOPURINOL 300 MG: 300 TABLET ORAL at 08:20

## 2021-01-01 RX ADMIN — SODIUM CHLORIDE, PRESERVATIVE FREE 10 ML: 5 INJECTION INTRAVENOUS at 10:14

## 2021-01-01 RX ADMIN — BUTALBITAL, ACETAMINOPHEN, AND CAFFEINE 1 TABLET: 50; 325; 40 TABLET ORAL at 20:21

## 2021-01-01 RX ADMIN — LISINOPRIL 20 MG: 20 TABLET ORAL at 08:45

## 2021-01-01 RX ADMIN — AMLODIPINE BESYLATE 10 MG: 10 TABLET ORAL at 08:20

## 2021-01-01 RX ADMIN — INDAPAMIDE 2.5 MG: 1.25 TABLET, FILM COATED ORAL at 16:34

## 2021-01-01 RX ADMIN — IOPAMIDOL 100 ML: 612 INJECTION, SOLUTION INTRAVENOUS at 21:54

## 2021-01-01 RX ADMIN — LISINOPRIL 20 MG: 20 TABLET ORAL at 08:21

## 2021-01-01 RX ADMIN — ALPRAZOLAM 0.25 MG: 0.25 TABLET ORAL at 16:32

## 2021-01-01 RX ADMIN — AMLODIPINE BESYLATE 10 MG: 10 TABLET ORAL at 08:45

## 2021-01-01 RX ADMIN — ACETAMINOPHEN 650 MG: 325 TABLET, FILM COATED ORAL at 03:33

## 2021-01-01 RX ADMIN — ASPIRIN 81 MG: 81 TABLET, COATED ORAL at 08:56

## 2021-01-01 RX ADMIN — MORPHINE SULFATE 2 MG: 2 INJECTION, SOLUTION INTRAMUSCULAR; INTRAVENOUS at 02:55

## 2021-01-01 RX ADMIN — SODIUM CHLORIDE, PRESERVATIVE FREE 10 ML: 5 INJECTION INTRAVENOUS at 20:39

## 2021-01-01 RX ADMIN — ROSUVASTATIN CALCIUM 10 MG: 10 TABLET, FILM COATED ORAL at 20:20

## 2021-01-01 RX ADMIN — METHYLPREDNISOLONE SODIUM SUCCINATE 40 MG: 40 INJECTION, POWDER, FOR SOLUTION INTRAMUSCULAR; INTRAVENOUS at 12:36

## 2021-01-01 RX ADMIN — SODIUM CHLORIDE 100 ML/HR: 9 INJECTION, SOLUTION INTRAVENOUS at 08:16

## 2021-01-01 RX ADMIN — ACETAMINOPHEN 650 MG: 325 TABLET, FILM COATED ORAL at 15:24

## 2021-01-01 RX ADMIN — SUCRALFATE 1 G: 1 TABLET ORAL at 21:49

## 2021-01-01 RX ADMIN — SODIUM CHLORIDE, PRESERVATIVE FREE 10 ML: 5 INJECTION INTRAVENOUS at 02:18

## 2021-01-01 RX ADMIN — MORPHINE SULFATE 2 MG: 2 INJECTION, SOLUTION INTRAMUSCULAR; INTRAVENOUS at 20:39

## 2021-01-01 RX ADMIN — DOCUSATE SODIUM 50 MG AND SENNOSIDES 8.6 MG 1 TABLET: 8.6; 5 TABLET, FILM COATED ORAL at 08:56

## 2021-01-01 RX ADMIN — PANTOPRAZOLE SODIUM 40 MG: 40 TABLET, DELAYED RELEASE ORAL at 08:45

## 2021-01-01 RX ADMIN — MONTELUKAST SODIUM 10 MG: 10 TABLET, FILM COATED ORAL at 20:52

## 2021-01-01 RX ADMIN — POLYETHYLENE GLYCOL (3350) 17 G: 17 POWDER, FOR SOLUTION ORAL at 08:23

## 2021-01-01 RX ADMIN — SUCRALFATE 1 G: 1 TABLET ORAL at 06:11

## 2021-01-01 RX ADMIN — ASPIRIN 324 MG: 81 TABLET, CHEWABLE ORAL at 00:48

## 2021-01-01 RX ADMIN — PROPOFOL 130 MG: 10 INJECTION, EMULSION INTRAVENOUS at 12:58

## 2021-01-01 RX ADMIN — LISINOPRIL 20 MG: 20 TABLET ORAL at 08:46

## 2021-01-01 RX ADMIN — PANTOPRAZOLE SODIUM 8 MG/HR: 40 INJECTION, POWDER, FOR SOLUTION INTRAVENOUS at 04:22

## 2021-01-01 RX ADMIN — MORPHINE SULFATE 2 MG: 2 INJECTION, SOLUTION INTRAMUSCULAR; INTRAVENOUS at 07:44

## 2021-01-01 RX ADMIN — DICLOFENAC SODIUM 4 G: 10 GEL TOPICAL at 20:53

## 2021-01-01 RX ADMIN — ACETAMINOPHEN 650 MG: 325 TABLET, FILM COATED ORAL at 14:45

## 2021-01-01 RX ADMIN — AMLODIPINE BESYLATE 5 MG: 5 TABLET ORAL at 16:33

## 2021-01-01 RX ADMIN — HYDROCODONE BITARTRATE AND ACETAMINOPHEN 1 TABLET: 5; 325 TABLET ORAL at 04:19

## 2021-01-01 RX ADMIN — SODIUM CHLORIDE, PRESERVATIVE FREE 10 ML: 5 INJECTION INTRAVENOUS at 08:45

## 2021-01-01 RX ADMIN — POLYETHYLENE GLYCOL (3350) 17 G: 17 POWDER, FOR SOLUTION ORAL at 08:21

## 2021-01-01 RX ADMIN — LIDOCAINE HYDROCHLORIDE 100 MG: 20 INJECTION, SOLUTION EPIDURAL; INFILTRATION; INTRACAUDAL; PERINEURAL at 12:58

## 2021-01-01 RX ADMIN — ALLOPURINOL 300 MG: 300 TABLET ORAL at 08:19

## 2021-01-01 RX ADMIN — MORPHINE SULFATE 2 MG: 2 INJECTION, SOLUTION INTRAMUSCULAR; INTRAVENOUS at 17:21

## 2021-01-01 RX ADMIN — SUCRALFATE 1 G: 1 TABLET ORAL at 17:39

## 2021-01-01 RX ADMIN — DICLOFENAC SODIUM 4 G: 10 GEL TOPICAL at 10:15

## 2021-01-01 RX ADMIN — DICLOFENAC SODIUM 4 G: 10 GEL TOPICAL at 12:07

## 2021-01-01 RX ADMIN — ONDANSETRON HYDROCHLORIDE 4 MG: 2 SOLUTION INTRAMUSCULAR; INTRAVENOUS at 11:31

## 2021-01-01 RX ADMIN — ROSUVASTATIN CALCIUM 10 MG: 10 TABLET, FILM COATED ORAL at 20:52

## 2021-01-01 RX ADMIN — SUCRALFATE 1 G: 1 TABLET ORAL at 20:20

## 2021-01-01 RX ADMIN — POLYETHYLENE GLYCOL (3350) 17 G: 17 POWDER, FOR SOLUTION ORAL at 14:30

## 2021-01-01 RX ADMIN — SODIUM CHLORIDE 100 ML/HR: 9 INJECTION, SOLUTION INTRAVENOUS at 17:37

## 2021-01-01 RX ADMIN — LISINOPRIL 20 MG: 20 TABLET ORAL at 08:23

## 2021-01-01 RX ADMIN — PANTOPRAZOLE SODIUM 40 MG: 40 TABLET, DELAYED RELEASE ORAL at 05:33

## 2021-01-01 RX ADMIN — SODIUM CHLORIDE 50 ML/HR: 9 INJECTION, SOLUTION INTRAVENOUS at 17:25

## 2021-01-01 RX ADMIN — HYDROCODONE BITARTRATE AND ACETAMINOPHEN 1 TABLET: 5; 325 TABLET ORAL at 20:52

## 2021-01-01 RX ADMIN — SUCRALFATE 1 G: 1 TABLET ORAL at 20:53

## 2021-01-01 RX ADMIN — LEVOTHYROXINE SODIUM 50 MCG: 50 TABLET ORAL at 05:33

## 2021-01-01 RX ADMIN — MORPHINE SULFATE 2 MG: 2 INJECTION, SOLUTION INTRAMUSCULAR; INTRAVENOUS at 21:07

## 2021-01-01 RX ADMIN — MELOXICAM 7.5 MG: 7.5 TABLET ORAL at 08:56

## 2021-01-01 RX ADMIN — ALLOPURINOL 300 MG: 300 TABLET ORAL at 08:21

## 2021-01-01 RX ADMIN — DICLOFENAC SODIUM 4 G: 10 GEL TOPICAL at 12:43

## 2021-01-01 RX ADMIN — PANTOPRAZOLE SODIUM 8 MG/HR: 40 INJECTION, POWDER, FOR SOLUTION INTRAVENOUS at 09:03

## 2021-01-01 RX ADMIN — LISINOPRIL 20 MG: 20 TABLET ORAL at 08:56

## 2021-01-01 RX ADMIN — THEOPHYLLINE ANHYDROUS 600 MG: 300 CAPSULE, EXTENDED RELEASE ORAL at 08:45

## 2021-01-01 RX ADMIN — PSYLLIUM HUSK 1 PACKET: 3.4 POWDER ORAL at 08:56

## 2021-01-01 RX ADMIN — SODIUM CHLORIDE 100 ML/HR: 9 INJECTION, SOLUTION INTRAVENOUS at 04:22

## 2021-01-01 RX ADMIN — LISINOPRIL 20 MG: 20 TABLET ORAL at 17:51

## 2021-01-01 RX ADMIN — ACETAMINOPHEN 650 MG: 325 TABLET, FILM COATED ORAL at 00:00

## 2021-01-01 RX ADMIN — SUCRALFATE 1 G: 1 TABLET ORAL at 08:56

## 2021-01-01 RX ADMIN — SUCRALFATE 1 G: 1 TABLET ORAL at 08:22

## 2021-01-01 RX ADMIN — PSYLLIUM HUSK 1 PACKET: 3.4 POWDER ORAL at 08:23

## 2021-01-01 RX ADMIN — MONTELUKAST SODIUM 10 MG: 10 TABLET, FILM COATED ORAL at 21:49

## 2021-01-01 RX ADMIN — LEVOTHYROXINE SODIUM 50 MCG: 50 TABLET ORAL at 06:11

## 2021-01-01 RX ADMIN — LEVOTHYROXINE SODIUM 50 MCG: 50 TABLET ORAL at 05:29

## 2021-01-01 RX ADMIN — POLYETHYLENE GLYCOL (3350) 17 G: 17 POWDER, FOR SOLUTION ORAL at 08:46

## 2021-01-01 RX ADMIN — KETOROLAC TROMETHAMINE 15 MG: 15 INJECTION, SOLUTION INTRAMUSCULAR; INTRAVENOUS at 12:36

## 2021-01-01 RX ADMIN — OXYCODONE HYDROCHLORIDE AND ACETAMINOPHEN 1 TABLET: 5; 325 TABLET ORAL at 12:35

## 2021-01-01 RX ADMIN — MORPHINE SULFATE 2 MG: 2 INJECTION, SOLUTION INTRAMUSCULAR; INTRAVENOUS at 12:47

## 2021-01-01 RX ADMIN — MELOXICAM 7.5 MG: 7.5 TABLET ORAL at 08:21

## 2021-01-01 RX ADMIN — SODIUM CHLORIDE, PRESERVATIVE FREE 10 ML: 5 INJECTION INTRAVENOUS at 08:24

## 2021-01-01 RX ADMIN — HYDROCODONE BITARTRATE AND ACETAMINOPHEN 1 TABLET: 5; 325 TABLET ORAL at 13:41

## 2021-01-01 RX ADMIN — OXYCODONE HYDROCHLORIDE AND ACETAMINOPHEN 1 TABLET: 5; 325 TABLET ORAL at 10:25

## 2021-01-01 RX ADMIN — SUCRALFATE 1 G: 1 TABLET ORAL at 17:18

## 2021-01-01 RX ADMIN — PSYLLIUM HUSK 1 PACKET: 3.4 POWDER ORAL at 11:54

## 2021-01-01 RX ADMIN — OXYCODONE HYDROCHLORIDE AND ACETAMINOPHEN 1 TABLET: 5; 325 TABLET ORAL at 21:49

## 2021-01-01 RX ADMIN — MONTELUKAST SODIUM 10 MG: 10 TABLET, FILM COATED ORAL at 20:39

## 2021-01-01 RX ADMIN — MELOXICAM 7.5 MG: 7.5 TABLET ORAL at 16:33

## 2021-01-01 RX ADMIN — DICLOFENAC SODIUM 4 G: 10 GEL TOPICAL at 21:48

## 2021-01-01 RX ADMIN — ASPIRIN 81 MG: 81 TABLET, COATED ORAL at 16:33

## 2021-01-01 RX ADMIN — ONDANSETRON HYDROCHLORIDE 4 MG: 2 SOLUTION INTRAMUSCULAR; INTRAVENOUS at 15:19

## 2021-01-01 RX ADMIN — SUCRALFATE 1 G: 1 TABLET ORAL at 16:33

## 2021-01-01 RX ADMIN — INDAPAMIDE 1.25 MG: 1.25 TABLET, FILM COATED ORAL at 08:45

## 2021-01-01 RX ADMIN — AMLODIPINE BESYLATE 5 MG: 5 TABLET ORAL at 08:21

## 2021-01-01 RX ADMIN — ALLOPURINOL 300 MG: 300 TABLET ORAL at 08:56

## 2021-01-01 RX ADMIN — MONTELUKAST SODIUM 10 MG: 10 TABLET, FILM COATED ORAL at 21:15

## 2021-01-01 RX ADMIN — DOCUSATE SODIUM 50 MG AND SENNOSIDES 8.6 MG 1 TABLET: 8.6; 5 TABLET, FILM COATED ORAL at 08:46

## 2021-01-01 RX ADMIN — MORPHINE SULFATE 2 MG: 2 INJECTION, SOLUTION INTRAMUSCULAR; INTRAVENOUS at 02:38

## 2021-01-01 RX ADMIN — DOCUSATE SODIUM 50 MG AND SENNOSIDES 8.6 MG 1 TABLET: 8.6; 5 TABLET, FILM COATED ORAL at 20:39

## 2021-01-01 RX ADMIN — LEVOTHYROXINE SODIUM 50 MCG: 50 TABLET ORAL at 06:18

## 2021-01-01 RX ADMIN — MORPHINE SULFATE 4 MG: 4 INJECTION, SOLUTION INTRAMUSCULAR; INTRAVENOUS at 20:27

## 2021-01-01 RX ADMIN — DICLOFENAC SODIUM 4 G: 10 GEL TOPICAL at 08:22

## 2021-01-01 RX ADMIN — ASPIRIN 81 MG: 81 TABLET, CHEWABLE ORAL at 08:45

## 2021-01-01 RX ADMIN — MONTELUKAST SODIUM 10 MG: 10 TABLET, FILM COATED ORAL at 20:21

## 2021-01-01 RX ADMIN — ASPIRIN 81 MG: 81 TABLET, COATED ORAL at 08:21

## 2021-01-01 RX ADMIN — PSYLLIUM HUSK 1 PACKET: 3.4 POWDER ORAL at 08:21

## 2021-01-01 RX ADMIN — DICLOFENAC SODIUM 4 G: 10 GEL TOPICAL at 12:37

## 2021-01-01 RX ADMIN — ONDANSETRON HYDROCHLORIDE 4 MG: 2 SOLUTION INTRAMUSCULAR; INTRAVENOUS at 20:26

## 2021-01-01 RX ADMIN — LEVOTHYROXINE SODIUM 50 MCG: 50 TABLET ORAL at 08:19

## 2021-01-01 RX ADMIN — MORPHINE SULFATE 2 MG: 2 INJECTION, SOLUTION INTRAMUSCULAR; INTRAVENOUS at 16:12

## 2021-01-01 RX ADMIN — OXYCODONE HYDROCHLORIDE AND ACETAMINOPHEN 1 TABLET: 5; 325 TABLET ORAL at 08:45

## 2021-01-01 RX ADMIN — SODIUM CHLORIDE 50 ML/HR: 9 INJECTION, SOLUTION INTRAVENOUS at 21:05

## 2021-01-01 RX ADMIN — LEVOTHYROXINE SODIUM 50 MCG: 50 TABLET ORAL at 06:08

## 2021-01-01 RX ADMIN — PANTOPRAZOLE SODIUM 8 MG/HR: 40 INJECTION, POWDER, FOR SOLUTION INTRAVENOUS at 22:48

## 2021-01-01 RX ADMIN — SODIUM CHLORIDE, PRESERVATIVE FREE 10 ML: 5 INJECTION INTRAVENOUS at 08:30

## 2021-01-01 RX ADMIN — OXYCODONE HYDROCHLORIDE AND ACETAMINOPHEN 1 TABLET: 5; 325 TABLET ORAL at 17:26

## 2021-03-02 PROBLEM — R63.4 WEIGHT LOSS: Chronic | Status: ACTIVE | Noted: 2021-01-01

## 2021-03-02 PROBLEM — M54.50 LOW BACK PAIN: Status: ACTIVE | Noted: 2021-01-01

## 2021-03-02 PROBLEM — N17.0 ACUTE KIDNEY INJURY (AKI) WITH ACUTE TUBULAR NECROSIS (ATN) (HCC): Status: ACTIVE | Noted: 2021-01-01

## 2021-03-02 PROBLEM — R11.2 INTRACTABLE NAUSEA AND VOMITING: Status: ACTIVE | Noted: 2021-01-01

## 2021-03-02 NOTE — CONSULTS
[facility] GASTROENTEROLOGY              Initial Inpatient Consult Note  Rosemary Petersen  1940    Referring Provider: Richie Peng MD    Admission: 3/1/2021  Consult date: 3/2/2021  Chief complaint: nausea/constipation and wt loss    Subjective     History of present illness:  Pt is a 80 year old female admitted with multiple complaints to include right hip, nausea vomiting, constipation and lower abdominal pain, constipation and wt loss. She has underlying hx of COPD, asthma, back pain, and type II diabetes. She describes her pain as a severe stabbing pain. She has lost approx 20 lbs in the past month. No appetite she says nothing tastes good. She is on iron and says she has had black stools. She has taken Pepto at times but not regularly. She has been on Prilosec as well for many years for reflux. She has also been taking Bentyl for underlying IBS symptoms. 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 No workup was performed at that time per her grand daughter. Dr Almaguer has seen her this admission.     Liver functions elevated this admission ALT 47, AST 47, ALKP 146, T bili 0.2. No known hx for liver disease. Amylase and lipase normal. No associated symptoms.     This admission CT shows hiatal hernia, large ventral hernia with transverse colon involvement no incarceration, diverticulosis. She has constipation noted. She has been on Percocet for lower back pain since last Thursday.     Past Medical History:  Past Medical History:   Diagnosis Date   • Asthma    • Back pain    • Back pain    • COPD (chronic obstructive pulmonary disease) (CMS/HCC)    • Diabetes mellitus (CMS/HCC)    • Hypertension        Past Surgical History:  Past Surgical History:   Procedure Laterality Date   • APPENDECTOMY     • CHOLECYSTECTOMY     • HYSTERECTOMY         Social History:   Social History      Tobacco Use   • Smoking status: Former Smoker     Years: 0.00   • Smokeless tobacco: Never Used   • Tobacco comment: QUIT 25 YEARS AGO   Substance Use Topics   • Alcohol use: Never     Frequency: Never        Family History:  History reviewed. No pertinent family history.  No family hx for colon cancer  Home Meds:  Medications Prior to Admission   Medication Sig Dispense Refill Last Dose   • albuterol (ACCUNEB) 1.25 MG/3ML nebulizer solution Take 1 ampule by nebulization Every 6 (Six) Hours As Needed for Wheezing.      • allopurinol (ZYLOPRIM) 300 MG tablet Take 300 mg by mouth Daily.      • ALPRAZolam (XANAX) 0.25 MG tablet Take 0.25 mg by mouth As Needed for Anxiety.      • ascorbic acid (VITAMIN C) 500 MG tablet Take 500 mg by mouth 2 (Two) Times a Day.      • aspirin 81 MG chewable tablet Chew 81 mg Daily.      • Butalbital-APAP-Caffeine (Esgic) -40 MG per capsule Take 1 capsule by mouth Every 4 (Four) Hours As Needed for Headache.      • dicyclomine (BENTYL) 10 MG capsule Take 10 mg by mouth 4 (Four) Times a Day Before Meals & at Bedtime.      • doxycycline (VIBRAMYCIN) 100 MG capsule Take 100 mg by mouth Daily.      • Ezetimibe (ZETIA PO) Take 10 mg by mouth.      • ferrous sulfate 325 (65 FE) MG tablet Take 325 mg by mouth 2 (two) times a day.      • indapamide (LOZOL) 1.25 MG tablet Take 1.25 mg by mouth Every Morning.      • levalbuterol (XOPENEX HFA) 45 MCG/ACT inhaler Inhale 2 puffs Every 4 (Four) Hours As Needed for Wheezing.      • levothyroxine (SYNTHROID, LEVOTHROID) 50 MCG tablet Take 50 mcg by mouth Daily.      • lisinopril (PRINIVIL,ZESTRIL) 20 MG tablet Take 20 mg by mouth Daily.      • montelukast (SINGULAIR) 10 MG tablet Take 10 mg by mouth Every Night.      • naproxen (NAPROSYN) 500 MG tablet Take 500 mg by mouth 2 (Two) Times a Day With Meals.      • omeprazole (priLOSEC) 20 MG capsule Take 20 mg by mouth 2 (two) times a day.      • oxyCODONE-acetaminophen (PERCOCET) 5-325 MG per  "tablet Take 1 tablet by mouth Every 6 (Six) Hours As Needed for Severe Pain . 10 tablet 0    • promethazine (PHENERGAN) 6.25 MG/5ML syrup Take  by mouth 4 (Four) Times a Day As Needed for Nausea or Vomiting.      • promethazine-codeine (PHENERGAN with CODEINE) 6.25-10 MG/5ML syrup Take 5 mL by mouth Every 4 (Four) Hours As Needed for Cough.      • rosuvastatin (Crestor) 40 MG tablet Take 40 mg by mouth Daily.      • theophylline (UNIPHYL) 600 MG 24 hr tablet Take 600 mg by mouth 2 (two) times a day.      • VITAMIN D PO Take 1.25 mg by mouth 1 (One) Time Per Week.          Current Meds:   Hospital Medications (active)       Dose Frequency Start End    acetaminophen (TYLENOL) 160 MG/5ML solution 650 mg 650 mg Every 4 Hours PRN 3/2/2021     Admin Instructions: Do not exceed 4 grams of acetaminophen in a 24 hr period. Max dose of 2gm for AST/ALT greater than 120 units/L<BR><BR><BR>If given for pain, use the following pain scale: <BR>Mild Pain = Pain Score of 1-3, CPOT 1-2<BR>Moderate Pain = Pain Score of 4-6, CPOT 3-4<BR>Severe Pain = Pain Score of 7-10, CPOT 5-8    Route: Oral    Linked Group 1: \"Or\" Linked Group Details        acetaminophen (TYLENOL) suppository 650 mg 650 mg Every 4 Hours PRN 3/2/2021     Admin Instructions: Do not exceed 4 grams of acetaminophen in a 24 hr period. Max dose of 2gm for AST/ALT greater than 120 units/L<BR><BR><BR>If given for pain, use the following pain scale: <BR>Mild Pain = Pain Score of 1-3, CPOT 1-2<BR>Moderate Pain = Pain Score of 4-6, CPOT 3-4<BR>Severe Pain = Pain Score of 7-10, CPOT 5-8    Route: Rectal    Linked Group 1: \"Or\" Linked Group Details        acetaminophen (TYLENOL) tablet 650 mg 650 mg Every 4 Hours PRN 3/2/2021     Admin Instructions: Do not exceed 4 grams of acetaminophen in a 24 hr period. Max dose of 2gm for AST/ALT greater than 120 units/L<BR><BR><BR>If given for pain, use the following pain scale: <BR>Mild Pain = Pain Score of 1-3, CPOT 1-2<BR>Moderate Pain " "= Pain Score of 4-6, CPOT 3-4<BR>Severe Pain = Pain Score of 7-10, CPOT 5-8    Route: Oral    Linked Group 1: \"Or\" Linked Group Details        albuterol (PROVENTIL) nebulizer solution 0.042% 1.25 mg/3mL 1.25 mg Every 6 Hours PRN 3/2/2021     Route: Nebulization    allopurinol (ZYLOPRIM) tablet 300 mg 300 mg Daily 3/2/2021     Admin Instructions: ([facility]) Take with food if GI upset occurs.    Route: Oral    ALPRAZolam (XANAX) tablet 0.25 mg 0.25 mg Every 8 Hours PRN 3/2/2021     Admin Instructions:  Caution: Look alike/sound alike drug alert.   Avoid grapefruit juice    Route: Oral    butalbital-acetaminophen-caffeine (FIORICET, ESGIC) -40 MG per tablet 1 tablet 1 tablet Every 6 Hours PRN 3/2/2021     Admin Instructions: Do not exceed 4g of acetaminophen in a 24 hour period.  Maximum 6 doses per 24 hours. Max dose of 2gm for AST/ALT greater than 120 units/L    Route: Oral    levothyroxine (SYNTHROID, LEVOTHROID) tablet 50 mcg 50 mcg Every Early Morning 3/2/2021     Admin Instructions: Take on empty stomach.    Route: Oral    montelukast (SINGULAIR) tablet 10 mg 10 mg Nightly 3/2/2021     Route: Oral    Morphine sulfate (PF) injection 2 mg 2 mg Every 4 Hours PRN 3/2/2021 3/9/2021    Admin Instructions: <BR><BR>Caution: Look alike/sound alike drug alert<BR><BR>If given for pain, use the following pain scale:<BR>Mild Pain = Pain Score of 1-3, CPOT 1-2<BR>Moderate Pain = Pain Score of 4-6, CPOT 3-4<BR>Severe Pain = Pain Score of 7-10, CPOT 5-8    Route: Intravenous    Linked Group 2: \"And\" Linked Group Details        naloxone (NARCAN) injection 0.4 mg 0.4 mg Every 5 Minutes PRN 3/2/2021     Admin Instructions: If respiratory rate is less than 8 breaths/minute or patient is difficult to arouse stop any narcotics and contact physician. <BR>Administer slow IV push. Repeat as ordered until patient's respiratory rate is greater than 12 breaths/minute.    Route: Intravenous    Linked Group 2: \"And\" Linked Group Details     " "   ondansetron (ZOFRAN) injection 4 mg 4 mg Every 6 Hours PRN 3/2/2021     Admin Instructions: If BOTH ondansetron (ZOFRAN) and promethazine (PHENERGAN) are ordered use ondansetron first and THEN promethazine IF ondansetron is ineffective.    Route: Intravenous    Linked Group 3: \"Or\" Linked Group Details        ondansetron (ZOFRAN) tablet 4 mg 4 mg Every 6 Hours PRN 3/2/2021     Admin Instructions: If BOTH ondansetron (ZOFRAN) and promethazine (PHENERGAN) are ordered use ondansetron first and THEN promethazine IF ondansetron is ineffective.    Route: Oral    Linked Group 3: \"Or\" Linked Group Details        pantoprazole (PROTONIX) 40 mg in 100mL NS IVPB 8 mg/hr Continuous 3/2/2021     Admin Instructions: Break seal and mix to activate vial before use    Route: Intravenous    sodium chloride 0.9 % flush 10 mL 10 mL As Needed 3/1/2021     Route: Intravenous    Cosign for Ordering: Accepted by Nick Au MD on 3/1/2021  8:37 PM    sodium chloride 0.9 % flush 10 mL 10 mL Every 12 Hours Scheduled 3/2/2021     Route: Intravenous    sodium chloride 0.9 % flush 10 mL 10 mL As Needed 3/2/2021     Route: Intravenous    sodium chloride 0.9 % infusion 100 mL/hr Continuous 3/2/2021     Route: Intravenous          Allergies:  No Known Allergies    Review of Systems  Review of Systems   Constitutional: Positive for fatigue and unexpected weight change (20 lbs in 2 months). Negative for activity change, appetite change, chills, diaphoresis and fever.   HENT: Negative for ear pain, hearing loss, mouth sores, sore throat, trouble swallowing and voice change.    Eyes: Negative.    Respiratory: Negative for cough, choking, shortness of breath and wheezing.    Cardiovascular: Negative for chest pain and palpitations.   Gastrointestinal: Positive for abdominal pain, constipation, nausea and vomiting. Negative for blood in stool and diarrhea.   Endocrine: Negative for cold intolerance and heat intolerance.   Genitourinary: " Negative for decreased urine volume, dysuria, frequency, hematuria and urgency.   Musculoskeletal: Positive for back pain. Negative for gait problem and myalgias.   Skin: Negative for color change, pallor and rash.   Allergic/Immunologic: Negative for food allergies and immunocompromised state.   Neurological: Positive for weakness. Negative for dizziness, tremors, seizures, syncope, light-headedness, numbness and headaches.   Hematological: Negative for adenopathy. Does not bruise/bleed easily.   Psychiatric/Behavioral: Negative for agitation and confusion. The patient is not nervous/anxious.    All other systems reviewed and are negative.       Objective     Vital Signs  Temp:  [97.9 °F (36.6 °C)-98.6 °F (37 °C)] 98.6 °F (37 °C)  Heart Rate:  [69-86] 69  Resp:  [16-19] 18  BP: (127-173)/(51-96) 148/63  Body mass index is 23.37 kg/m².    Physical Exam:  General Appearance:    Alert, cooperative, in no acute distress   Head:    Normocephalic, without obvious abnormality, atraumatic   Eyes:            Lids and lashes normal, conjunctivae and sclerae normal, no icterus, conjunctival pallor   Throat:   No oral lesions, no thrush, oral mucosa moist, posterior oropharynx clear   Neck:   No adenopathy, supple, trachea midline, no thyromegaly, no   carotid bruit, no JVD   Lungs:     Clear to auscultation,respirations regular, even and            unlabored    Heart:    Regular rhythm and normal rate, normal S1 and S2,           no   murmur   Chest Wall:    No abnormalities observed   Abdomen:     Normal bowel sounds, no masses, no organomegaly,     Soft nontender on exam, nondistended, no guarding, no rebound      tenderness   Rectal:     Deferred   Extremities:   no edema, no cyanosis   Skin:   No open lesions, bruising or rash   Lymph nodes:   No palpable cervical adenopathy   Psychiatric:  Judgment and insight: normal   Orientation to person place and time: normal   Mood and affect: normal     Results Review:  Results  from last 7 days   Lab Units 03/01/21 2058 02/25/21 2026   WBC 10*3/mm3 6.10 5.69   HEMOGLOBIN g/dL 11.7* 11.2*   HEMATOCRIT % 36.6 35.5   PLATELETS 10*3/mm3 275 255       Results from last 7 days   Lab Units 03/01/21 2058 02/25/21 2026   SODIUM mmol/L 137 137   POTASSIUM mmol/L 5.0 4.7   CHLORIDE mmol/L 102 105   CO2 mmol/L 23.0 23.0   BUN mg/dL 55* 43*   CREATININE mg/dL 2.01* 1.50*   CALCIUM mg/dL 10.4 10.3   BILIRUBIN mg/dL 0.2  --    ALK PHOS U/L 146*  --    ALT (SGPT) U/L 47*  --    AST (SGOT) U/L 47*  --    GLUCOSE mg/dL 119* 114*              Radiology Review:  Imaging Results (Last 72 Hours)     Procedure Component Value Units Date/Time    XR Hip With or Without Pelvis 2 - 3 View Right [[id number]] Resulted: 03/02/21 1013     Updated: 03/02/21 1014    CT Abdomen Pelvis Without Contrast [[id number]] Collected: 03/02/21 0750     Updated: 03/02/21 0800    Narrative:      CT ABDOMEN PELVIS WO CONTRAST- 3/2/2021 2:05 AM CST     HISTORY: Abdominal abscess/infection suspected      COMPARISON: 2/25/2021      DLP: 244 mGy cm. Automated exposure control was utilized to diminish  patient radiation dose.     TECHNIQUE: Noncontrast enhanced images of the abdomen and pelvis  obtained without oral contrast.      FINDINGS:   Bibasilar atelectasis is present. There is mild cardiomegaly. There is a  trace pericardial effusion or pericardial thickening. A large hiatal  hernia is present..      LIVER: No focal liver lesion.      BILIARY SYSTEM: The gallbladder has been surgically removed. There is no  evidence of intra or extrahepatic dilatation..      PANCREAS: No focal pancreatic lesion.      SPLEEN: Unremarkable.      KIDNEYS AND ADRENALS: There is a 5 mm nonobstructing calculus lower pole  calyx right kidney. The adrenals are unremarkable. Nodes of discrete  renal mass or left-sided nephrolithiasis..  The ureters are decompressed  and normal in appearance.     RETROPERITONEUM: No mass, lymphadenopathy or hemorrhage.       GI TRACT: There is moderate constipation with increased stool throughout  the colon. There is a ventral wall hernia containing a segment of  transverse colon without evidence of incarceration or obstruction.  Diverticulosis is noted the descending and sigmoid colon with no  radiographic evidence of acute diverticulitis.. The appendix is  surgically absent..     OTHER: There is no mesenteric mass, lymphadenopathy or fluid collection.  The osseous structures and soft tissues demonstrate no worrisome  lesions. Grade 2 spondylolisthesis is noted of L5 on S1. There is mild  retrolisthesis of L1 on L2. No acute fractures are present. No  suspicious lesions are present.      PELVIS: The patient's undergone prior hysterectomy. There is no free  fluid or pelvic mass demonstrated.. The urinary bladder is normal in  appearance.       Impression:      1. Moderate constipation. There is a ventral wall hernia containing a  segment of the transverse colon without evidence of incarceration or  obstruction. The patient is status post hysterectomy, cholecystectomy  and appendectomy.  2. Large hiatal hernia.  3. Nonobstructing calculus lower pole calyx right kidney. No evidence of  ureteral stone or obstructive uropathy..         This report was finalized on 03/02/2021 07:56 by Dr. Luis Darnell MD.    XR Chest 1 View [[id number]] Collected: 03/01/21 2153     Updated: 03/01/21 2156    Narrative:      EXAM: XR CHEST 1 VW- 3/1/2021 9:44 PM CST     HISTORY: Weak/Dizzy/AMS triage protocol       COMPARISON: None.     TECHNIQUE: Frontal radiograph of the chest     FINDINGS:   Chronic changes present in the pulmonary parenchyma. Mild hyperinflation  is noted.. Cardiac silhouette is normal. Vascular calcifications present  aortic arch and descending thoracic aorta..      The osseous structures and surrounding soft tissues demonstrate no acute  abnormality.          Impression:      1. Chronic change in the pulmonary parenchyma with no  acute  cardiopulmonary process. Skin fold projects over the left chest.        This report was finalized on 03/01/2021 21:53 by Dr. Herman Feliciano MD.          Assessment/Plan         Acute kidney injury (EHSAN) with acute tubular necrosis (ATN) (CMS/HCC)    Weight loss    Intractable nausea and vomiting    Low back pain      1. Persistent Nausea, vomiting, loss of appetite   2. Wt loss 20 lbs in 2 months  3. constipation  4. Lower abdominal pain  5. Abnormal CT scan large ventral wall hernia, hiatal hernia, constipation  6. EHSAN BUN 55, Creat 2.01  7. Elevated troponin on admission 0.048, trended down to 0.036.  8. Questionable mesenteric ischemia due to CT angio findings at outlying facility Dr Almaguer has seen today and no intervention is planned he felt patent given most recent CT findings.     Will perform Endoscopy to rule out upper GI related findings that could be contributing to her symptoms. R/B/I/A discussed and she is agreeable if cleared by Cardiology due to elevated troponin.  Continue PPI therapy  Recommend we discontinue Bentyl due to constipation  Will initiate Miralax.   The liver on CT was unremarkable will follow numbers workup can be outpatient for this.  Can plan outpatient follow up post discharge with me in the office for this.      EMR Dragon/transcription disclaimer: Much of this encounter note is electronic transcription/translation of spoken language to printed text. The electronic translation of spoken language may be erroneous, or at times, nonsensical words or phrases may be inadvertently transcribed. Although I have reviewed the note for such errors, some may still exist.  LUI Mar  [facility]  03/02/21  10:16 CST

## 2021-03-02 NOTE — ED NOTES
PT ASSISTED TO W/C AND TO BATHROOM PER FAMILY.  PT RETURNED WITHOUT DIFFICULTY.     Eladio Muniz RN  03/02/21 0138

## 2021-03-02 NOTE — CONSULTS
Rosemary Petersen  [id number]  [id number]  386/1  Elier Heard MD  3/1/2021    Chief Complaint   Patient presents with   • Weakness - Generalized   • Nausea   • Vomiting       HPI: Patient is an 80-year-old white female past medical history of COPD, chronic back pain, type 2 diabetes mellitus, and hypertension.  She presents with an off and on for a year history of abdominal pain, nausea, and vomiting.  She has lost a significant amount of weight in the past year and lost about another 20 pounds in the last month.  She has had imaging at different facilities including [facility].  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  She had further imaging here which contradicted that did show some calcifications but no evidence of severe stenosis and a hernia that was not incarcerated.  She has continued to have vomiting.  It does not seem to be postprandial it seems to be random however she is unable to keep any weight.  She denies hematemesis she denies melena although she has dark stools from iron pills.  She has never had an upper endoscopy.  She also denies any mesenteric ischemic symptoms.  I carefully reviewed her CTA as well.    Past Medical History:   Diagnosis Date   • Asthma    • Back pain    • Back pain    • COPD (chronic obstructive pulmonary disease) (CMS/HCC)    • Diabetes mellitus (CMS/HCC)    • Hypertension        Past Surgical History:   Procedure Laterality Date   • APPENDECTOMY     • CHOLECYSTECTOMY     • HYSTERECTOMY         History reviewed. No pertinent family history.    Social History     Socioeconomic History   • Marital status: Single     Spouse name: Not on file   • Number of children: Not on file   • Years of education: Not on file   • Highest education level: Not on file   Tobacco Use   • Smoking status: Former Smoker     Years: 0.00   • Smokeless tobacco: Never Used   • Tobacco comment: QUIT 25 YEARS AGO  "  Substance and Sexual Activity   • Alcohol use: Never     Frequency: Never   • Drug use: Never   • Sexual activity: Defer       No Known Allergies    Hospital Medications (active)       Dose Frequency Start End    acetaminophen (TYLENOL) 160 MG/5ML solution 650 mg 650 mg Every 4 Hours PRN 3/2/2021     Admin Instructions: Do not exceed 4 grams of acetaminophen in a 24 hr period. Max dose of 2gm for AST/ALT greater than 120 units/L<BR><BR><BR>If given for pain, use the following pain scale: <BR>Mild Pain = Pain Score of 1-3, CPOT 1-2<BR>Moderate Pain = Pain Score of 4-6, CPOT 3-4<BR>Severe Pain = Pain Score of 7-10, CPOT 5-8    Route: Oral    Linked Group 1: \"Or\" Linked Group Details        acetaminophen (TYLENOL) suppository 650 mg 650 mg Every 4 Hours PRN 3/2/2021     Admin Instructions: Do not exceed 4 grams of acetaminophen in a 24 hr period. Max dose of 2gm for AST/ALT greater than 120 units/L<BR><BR><BR>If given for pain, use the following pain scale: <BR>Mild Pain = Pain Score of 1-3, CPOT 1-2<BR>Moderate Pain = Pain Score of 4-6, CPOT 3-4<BR>Severe Pain = Pain Score of 7-10, CPOT 5-8    Route: Rectal    Linked Group 1: \"Or\" Linked Group Details        acetaminophen (TYLENOL) tablet 650 mg 650 mg Every 4 Hours PRN 3/2/2021     Admin Instructions: Do not exceed 4 grams of acetaminophen in a 24 hr period. Max dose of 2gm for AST/ALT greater than 120 units/L<BR><BR><BR>If given for pain, use the following pain scale: <BR>Mild Pain = Pain Score of 1-3, CPOT 1-2<BR>Moderate Pain = Pain Score of 4-6, CPOT 3-4<BR>Severe Pain = Pain Score of 7-10, CPOT 5-8    Route: Oral    Linked Group 1: \"Or\" Linked Group Details        albuterol (PROVENTIL) nebulizer solution 0.042% 1.25 mg/3mL 1.25 mg Every 6 Hours PRN 3/2/2021     Route: Nebulization    allopurinol (ZYLOPRIM) tablet 300 mg 300 mg Daily 3/2/2021     Admin Instructions: ([facility]) Take with food if GI upset occurs.    Route: Oral    ALPRAZolam (XANAX) tablet 0.25 mg " "0.25 mg Every 8 Hours PRN 3/2/2021     Admin Instructions:  Caution: Look alike/sound alike drug alert.   Avoid grapefruit juice    Route: Oral    butalbital-acetaminophen-caffeine (FIORICET, ESGIC) -40 MG per tablet 1 tablet 1 tablet Every 6 Hours PRN 3/2/2021     Admin Instructions: Do not exceed 4g of acetaminophen in a 24 hour period.  Maximum 6 doses per 24 hours. Max dose of 2gm for AST/ALT greater than 120 units/L    Route: Oral    levothyroxine (SYNTHROID, LEVOTHROID) tablet 50 mcg 50 mcg Every Early Morning 3/2/2021     Admin Instructions: Take on empty stomach.    Route: Oral    montelukast (SINGULAIR) tablet 10 mg 10 mg Nightly 3/2/2021     Route: Oral    Morphine sulfate (PF) injection 2 mg 2 mg Every 4 Hours PRN 3/2/2021 3/9/2021    Admin Instructions: <BR><BR>Caution: Look alike/sound alike drug alert<BR><BR>If given for pain, use the following pain scale:<BR>Mild Pain = Pain Score of 1-3, CPOT 1-2<BR>Moderate Pain = Pain Score of 4-6, CPOT 3-4<BR>Severe Pain = Pain Score of 7-10, CPOT 5-8    Route: Intravenous    Linked Group 2: \"And\" Linked Group Details        naloxone (NARCAN) injection 0.4 mg 0.4 mg Every 5 Minutes PRN 3/2/2021     Admin Instructions: If respiratory rate is less than 8 breaths/minute or patient is difficult to arouse stop any narcotics and contact physician. <BR>Administer slow IV push. Repeat as ordered until patient's respiratory rate is greater than 12 breaths/minute.    Route: Intravenous    Linked Group 2: \"And\" Linked Group Details        ondansetron (ZOFRAN) injection 4 mg 4 mg Every 6 Hours PRN 3/2/2021     Admin Instructions: If BOTH ondansetron (ZOFRAN) and promethazine (PHENERGAN) are ordered use ondansetron first and THEN promethazine IF ondansetron is ineffective.    Route: Intravenous    Linked Group 3: \"Or\" Linked Group Details        ondansetron (ZOFRAN) tablet 4 mg 4 mg Every 6 Hours PRN 3/2/2021     Admin Instructions: If BOTH ondansetron (ZOFRAN) and " "promethazine (PHENERGAN) are ordered use ondansetron first and THEN promethazine IF ondansetron is ineffective.    Route: Oral    Linked Group 3: \"Or\" Linked Group Details        pantoprazole (PROTONIX) 40 mg in 100mL NS IVPB 8 mg/hr Continuous 3/2/2021     Admin Instructions: Break seal and mix to activate vial before use    Route: Intravenous    sodium chloride 0.9 % flush 10 mL 10 mL As Needed 3/1/2021     Route: Intravenous    Cosign for Ordering: Accepted by Nick Au MD on 3/1/2021  8:37 PM    sodium chloride 0.9 % flush 10 mL 10 mL Every 12 Hours Scheduled 3/2/2021     Route: Intravenous    sodium chloride 0.9 % flush 10 mL 10 mL As Needed 3/2/2021     Route: Intravenous    sodium chloride 0.9 % infusion 100 mL/hr Continuous 3/2/2021     Route: Intravenous          Review of Systems   Constitutional: Negative.    HENT: Negative.    Eyes: Negative.    Respiratory: Negative.    Cardiovascular: Negative.    Gastrointestinal: Positive for abdominal pain, nausea and vomiting.   Endocrine: Negative.    Genitourinary: Negative.    Musculoskeletal: Negative.    Skin: Negative.    Allergic/Immunologic: Negative.    Neurological: Negative.    Hematological: Negative.    Psychiatric/Behavioral: Negative.    All other systems reviewed and are negative.      /62 (BP Location: Right arm, Patient Position: Lying)   Pulse 70   Temp 98.6 °F (37 °C)   Resp 18   Ht 157.5 cm (62\")   Wt 58 kg (127 lb 12.8 oz)   SpO2 93%   BMI 23.37 kg/m²    Physical Exam  Vitals signs and nursing note reviewed.   Constitutional:       Appearance: She is well-developed.   HENT:      Head: Normocephalic and atraumatic.   Eyes:      General: No scleral icterus.     Pupils: Pupils are equal, round, and reactive to light.   Neck:      Musculoskeletal: Neck supple.      Thyroid: No thyromegaly.      Vascular: No carotid bruit or JVD.   Cardiovascular:      Rate and Rhythm: Normal rate and regular rhythm.      Pulses:           " Carotid pulses are 2+ on the right side and 2+ on the left side.       Femoral pulses are 2+ on the right side and 2+ on the left side.       Popliteal pulses are 2+ on the right side and 2+ on the left side.        Dorsalis pedis pulses are 2+ on the right side and 2+ on the left side.        Posterior tibial pulses are 2+ on the right side and 2+ on the left side.      Heart sounds: Normal heart sounds.   Pulmonary:      Effort: Pulmonary effort is normal.      Breath sounds: Normal breath sounds.   Abdominal:      General: Bowel sounds are normal. There is no distension or abdominal bruit.      Palpations: Abdomen is soft. There is no mass.      Tenderness: There is abdominal tenderness.   Musculoskeletal: Normal range of motion.   Lymphadenopathy:      Cervical: No cervical adenopathy.   Skin:     General: Skin is warm and dry.   Neurological:      Mental Status: She is alert and oriented to person, place, and time.      Cranial Nerves: No cranial nerve deficit.      Sensory: No sensory deficit.         Laboratory Data:  Results from last 7 days   Lab Units 03/01/21 2058 02/25/21 2026   WBC 10*3/mm3 6.10 5.69   HEMOGLOBIN g/dL 11.7* 11.2*   HEMATOCRIT % 36.6 35.5   PLATELETS 10*3/mm3 275 255       Results from last 7 days   Lab Units 03/01/21 2058 02/25/21 2026   SODIUM mmol/L 137 137   POTASSIUM mmol/L 5.0 4.7   CHLORIDE mmol/L 102 105   CO2 mmol/L 23.0 23.0   BUN mg/dL 55* 43*   CREATININE mg/dL 2.01* 1.50*   CALCIUM mg/dL 10.4 10.3   BILIRUBIN mg/dL 0.2  --    ALK PHOS U/L 146*  --    ALT (SGPT) U/L 47*  --    AST (SGOT) U/L 47*  --    GLUCOSE mg/dL 119* 114*             Diagnostic Data:  Imaging Results (Last 24 Hours)     Procedure Component Value Units Date/Time    XR Hip With or Without Pelvis 2 - 3 View Right [[id number]] Collected: 03/02/21 1013     Updated: 03/02/21 1018    Narrative:      HISTORY: Right hip pain     Right hip: Frontal view the pelvis with frontal frog-leg lateral views  of the  right hip are obtained.     There is moderate bilateral hip joint space narrowing. A round  calcification left of L5 transverse process corresponds to a calcified  phlebolith based on CT abdomen pelvis 3/2/2021. Smaller phleboliths  present within left lower pelvis. No fracture or dislocation is  identified. No suspicious focal bony lesions.       Impression:      1. Mild to moderate arthritic narrowing of the hip joints with no acute  osseous pathology  This report was finalized on 03/02/2021 10:14 by Dr. Monika Bal MD.    CT Abdomen Pelvis Without Contrast [[id number]] Collected: 03/02/21 0750     Updated: 03/02/21 0800    Narrative:      CT ABDOMEN PELVIS WO CONTRAST- 3/2/2021 2:05 AM CST     HISTORY: Abdominal abscess/infection suspected      COMPARISON: 2/25/2021      DLP: 244 mGy cm. Automated exposure control was utilized to diminish  patient radiation dose.     TECHNIQUE: Noncontrast enhanced images of the abdomen and pelvis  obtained without oral contrast.      FINDINGS:   Bibasilar atelectasis is present. There is mild cardiomegaly. There is a  trace pericardial effusion or pericardial thickening. A large hiatal  hernia is present..      LIVER: No focal liver lesion.      BILIARY SYSTEM: The gallbladder has been surgically removed. There is no  evidence of intra or extrahepatic dilatation..      PANCREAS: No focal pancreatic lesion.      SPLEEN: Unremarkable.      KIDNEYS AND ADRENALS: There is a 5 mm nonobstructing calculus lower pole  calyx right kidney. The adrenals are unremarkable. Nodes of discrete  renal mass or left-sided nephrolithiasis..  The ureters are decompressed  and normal in appearance.     RETROPERITONEUM: No mass, lymphadenopathy or hemorrhage.      GI TRACT: There is moderate constipation with increased stool throughout  the colon. There is a ventral wall hernia containing a segment of  transverse colon without evidence of incarceration or obstruction.  Diverticulosis is noted the  descending and sigmoid colon with no  radiographic evidence of acute diverticulitis.. The appendix is  surgically absent..     OTHER: There is no mesenteric mass, lymphadenopathy or fluid collection.  The osseous structures and soft tissues demonstrate no worrisome  lesions. Grade 2 spondylolisthesis is noted of L5 on S1. There is mild  retrolisthesis of L1 on L2. No acute fractures are present. No  suspicious lesions are present.      PELVIS: The patient's undergone prior hysterectomy. There is no free  fluid or pelvic mass demonstrated.. The urinary bladder is normal in  appearance.       Impression:      1. Moderate constipation. There is a ventral wall hernia containing a  segment of the transverse colon without evidence of incarceration or  obstruction. The patient is status post hysterectomy, cholecystectomy  and appendectomy.  2. Large hiatal hernia.  3. Nonobstructing calculus lower pole calyx right kidney. No evidence of  ureteral stone or obstructive uropathy..         This report was finalized on 03/02/2021 07:56 by Dr. Luis Darnell MD.    XR Chest 1 View [[id number]] Collected: 03/01/21 2153     Updated: 03/01/21 2156    Narrative:      EXAM: XR CHEST 1 VW- 3/1/2021 9:44 PM CST     HISTORY: Weak/Dizzy/AMS triage protocol       COMPARISON: None.     TECHNIQUE: Frontal radiograph of the chest     FINDINGS:   Chronic changes present in the pulmonary parenchyma. Mild hyperinflation  is noted.. Cardiac silhouette is normal. Vascular calcifications present  aortic arch and descending thoracic aorta..      The osseous structures and surrounding soft tissues demonstrate no acute  abnormality.          Impression:      1. Chronic change in the pulmonary parenchyma with no acute  cardiopulmonary process. Skin fold projects over the left chest.        This report was finalized on 03/01/2021 21:53 by Dr. Herman Feliciano MD.        EXAM: CT ABDOMEN PELVIS W CONTRAST-2/25/2021 9:57 PM CST  INDICATION: abdominal  pain, lower back pain, history of SMA stenosis,  hiatal hernia  COMPARISON: CT abdomen pelvis dated 4/6/19  TECHNIQUE:  Abdomen and pelvis were scanned utilizing a multidetector helical  scanner from the diaphragm to the ischial tuberosities after  administration of IV contrast. Coronal and sagittal reformations were  obtained. [Routine protocol is performed.]     Radiation dose equals  mGy-cm.  Automated exposure control dose  reduction technique was implemented.        FINDINGS:     LINES and TUBES: None.     LOWER THORAX: Scarring in the lower lobes bilaterally. Mild  cardiomegaly. Moderate hiatal hernia.     HEPATOBILIARY:  No focal hepatic lesions.   No liver laceration.    Intrahepatic and extra hepatic biliary duct dilatation, favored to  reflect reservoir effect..      GALLBLADDER: Surgically absent.     SPLEEN:  No splenomegaly.    No splenic laceration.      PANCREAS:  No focal masses or ductal dilatation.      ADRENALS:  No adrenal nodules.      KIDNEYS/URETERS:  Kidneys enhance symmetrically.   No hydronephrosis.    No cystic or solid mass lesions.  4 mm nonobstructive stone in the lower  aspect of the right renal pelvis...      GI TRACT:  No abnormal distention, wall thickening, or evidence of bowel  obstruction.   There is no acute appendicitis. There is a fat and colon  containing supraumbilical hernia. The hernia measures  5.6 x 9.11 cm  with the defect measuring 2.2 x 5 cm. No associated bowel obstruction.  No evidence of strangulation. Colonic diverticulosis, without evidence  of acute diverticulitis.     PELVIC ORGANS/BLADDER:  Unremarkable.      LYMPH NODES:  No inguinal canal, pelvic wall, retroperitoneal or  mesenteric lymphadenopathy by size criteria..      VESSELS:  Atherosclerotic disease. No aortic aneurysm. No evidence of  acute aortic injury.. The portal vein is patent.     PERITONEUM / RETROPERITONEUM:  No free air or fluid.      BONES:  There is no acute osseous pathology.  Anterolisthesis of L5 on  S1..      SOFT TISSUES:  Unremarkable.      IMPRESSION:  1.  4 mm nonobstructive right renal stone.  2.  Colonic diverticulosis, without evidence of diverticulitis.  3.  Large ventral hernia, fat and colon containing without evidence of  bowel obstruction.  4.  Moderate hiatal hernia.  This report was finalized on 02/25/2021 22:20 by Dr. Gina Zamora MD.         Impression:    Acute kidney injury (EHSAN) with acute tubular necrosis (ATN) (CMS/HCC)    Weight loss    Intractable nausea and vomiting    Low back pain      Plan: After thoroughly evaluating Rosemary Petersen, I believe the best course of action is to remain conservative from a vascular surgery standpoint.  I had a lengthy discussion with the patient and family and it does not appear that she has any sort of mesenteric ischemic symptoms.  Her CTA does show the celiac, SMA, and FIDELIA to be widely patent.  There is some plaque burden noted but the lumen again is widely patent.  She is going to undergo endoscopy with GI which I believe is appropriate.  I will follow up with her in the office in the next 2 to 3 weeks to investigate other sources of vascular disease.  She will continue on with her current medication regimen.  Thank you for allowing me to participate in the care of your patient.  Please do not hesitate to call with any questions or concerns.  Perry Almaguer, DO   Vascular Surgery  [phone]

## 2021-03-02 NOTE — CONSULTS
"[facility] HEART GROUP CONSULT NOTE    Referring Provider: Richie Peng MD    Reason for Consultation: \" Elevated troponin\"    Chief Complaint   Patient presents with   • Weakness - Generalized   • Nausea   • Vomiting       Subjective .     History of present illness:  Rosemary Petersen is a 80 y.o. female who presented to the emergency department last night for complaints of nausea and vomiting.  There have been recent complaints with associated weight loss.  They have previously been evaluated for possibility of incarcerated hiatal hernia and arterial calcifications at other facilities with reported contradicting impressions.  Nevertheless, the patient presented last night for ongoing nausea and vomiting she was found to have an acute kidney injury, minimally elevated liver enzymes, elevated alk phos, minimally elevated amylase, and an elevated troponin.  Chest x-ray was negative for acute cardiopulmonary process.  CT of the abdomen was obtained noting moderate constipation, large hiatal hernia.  A ventral wall hernia was noted without evidence of incarceration.  EKG was obtained not acute ST-T findings, sinus rhythm..    The patient was evaluated by gastroenterology this morning with plans to perform endoscopy to rule out upper GI findings that could be contributing to her symptoms.  They have requested that the patient be evaluated by cardiology given her elevated troponin.    Patient has also been evaluated by Dr. Almaguer.  There was some mention of questionable mesenteric ischemia due to CT angio findings at an outlying facility however Dr. Almaguer feels that no intervention is necessary at this time given her most recent imaging.  Per notes in the chart from today.    History  Past Medical History:   Diagnosis Date   • Asthma    • Back pain    • Back pain    • COPD (chronic obstructive pulmonary disease) (CMS/HCC)    • Diabetes mellitus (CMS/[facility])    • Hypertension    ,   Past Surgical History: "   Procedure Laterality Date   • APPENDECTOMY     • CHOLECYSTECTOMY     • HYSTERECTOMY     ,   History reviewed. No pertinent family history.,   Social History     Tobacco Use   • Smoking status: Former Smoker     Years: 0.00   • Smokeless tobacco: Never Used   • Tobacco comment: QUIT 25 YEARS AGO   Substance Use Topics   • Alcohol use: Never     Frequency: Never   • Drug use: Never   ,     Medications  Current Facility-Administered Medications   Medication Dose Route Frequency Provider Last Rate Last Admin   • acetaminophen (TYLENOL) tablet 650 mg  650 mg Oral Q4H PRN Richie Peng MD        Or   • acetaminophen (TYLENOL) 160 MG/5ML solution 650 mg  650 mg Oral Q4H PRN Richie Peng MD        Or   • acetaminophen (TYLENOL) suppository 650 mg  650 mg Rectal Q4H PRN Richie Peng MD       • albuterol (PROVENTIL) nebulizer solution 0.042% 1.25 mg/3mL  1.25 mg Nebulization Q6H PRN Richie Peng MD       • allopurinol (ZYLOPRIM) tablet 300 mg  300 mg Oral Daily Richie Peng MD   300 mg at 03/02/21 0820   • ALPRAZolam (XANAX) tablet 0.25 mg  0.25 mg Oral Q8H PRN Richie Peng MD       • butalbital-acetaminophen-caffeine (FIORICET, ESGIC) -40 MG per tablet 1 tablet  1 tablet Oral Q6H PRN Richie Peng MD       • levothyroxine (SYNTHROID, LEVOTHROID) tablet 50 mcg  50 mcg Oral Q AM Richie Peng MD   50 mcg at 03/02/21 0819   • montelukast (SINGULAIR) tablet 10 mg  10 mg Oral Nightly Richie Peng MD       • Morphine sulfate (PF) injection 2 mg  2 mg Intravenous Q4H PRN Richie Peng MD   2 mg at 03/02/21 0833    And   • naloxone (NARCAN) injection 0.4 mg  0.4 mg Intravenous Q5 Min PRN Richie Peng MD       • ondansetron (ZOFRAN) tablet 4 mg  4 mg Oral Q6H PRN Richie Peng MD        Or   • ondansetron (ZOFRAN) injection 4 mg  4 mg Intravenous Q6H PRN Richie Peng MD   4 mg at 03/02/21 0874   • pantoprazole (PROTONIX) 40 mg in 100mL NS IVPB  8 mg/hr  "Intravenous Continuous Richie Peng MD 20 mL/hr at 03/02/21 1026 8 mg/hr at 03/02/21 1026   • polyethylene glycol (MIRALAX) packet 17 g  17 g Oral Daily Monika Soto APRN       • sodium chloride 0.9 % flush 10 mL  10 mL Intravenous PRN Richie Peng MD       • sodium chloride 0.9 % flush 10 mL  10 mL Intravenous Q12H Richie Peng MD   10 mL at 03/02/21 0819   • sodium chloride 0.9 % flush 10 mL  10 mL Intravenous PRN Richie Peng MD       • sodium chloride 0.9 % infusion  100 mL/hr Intravenous Continuous Richie Peng  mL/hr at 03/02/21 1026 100 mL/hr at 03/02/21 1026       Allergies:  Patient has no known allergies.    Review of Systems  Review of Systems   Constitution: Positive for decreased appetite, malaise/fatigue and weight loss.   Cardiovascular: Negative for chest pain, irregular heartbeat, leg swelling, near-syncope, orthopnea, palpitations, paroxysmal nocturnal dyspnea and syncope.   Respiratory: Negative for wheezing.    Hematologic/Lymphatic: Negative for bleeding problem.   Musculoskeletal: Positive for joint pain ( right hip).   Gastrointestinal: Positive for diarrhea, nausea and vomiting. Negative for bloating and abdominal pain.   Neurological: Negative for dizziness, headaches, light-headedness and loss of balance.   Psychiatric/Behavioral: Negative for altered mental status.       Objective     Physical Exam:  Patient Vitals for the past 24 hrs:   BP Temp Temp src Pulse Resp SpO2 Height Weight   03/02/21 1127 160/62 98.6 °F (37 °C) -- 70 18 93 % -- --   03/02/21 0727 148/63 98.6 °F (37 °C) Oral 69 18 99 % -- --   03/02/21 0631 173/68 98.3 °F (36.8 °C) Oral 73 18 96 % 157.5 cm (62\") 58 kg (127 lb 12.8 oz)   03/02/21 0612 -- 97.9 °F (36.6 °C) Oral -- -- -- -- --   03/02/21 0609 -- -- Oral -- -- -- -- --   03/02/21 0600 157/93 -- -- 81 18 95 % -- --   03/02/21 0545 158/96 -- -- 82 19 94 % -- --   03/02/21 0445 137/66 -- -- 75 16 94 % -- --   03/02/21 0430 -- " "-- -- 83 -- 94 % -- --   03/02/21 0330 -- -- -- 80 16 92 % -- --   03/02/21 0300 140/64 -- -- 75 18 94 % -- --   03/02/21 0227 -- -- -- 79 -- 96 % -- --   03/02/21 0200 157/66 -- -- 81 18 95 % -- --   03/02/21 0130 146/69 -- -- 78 16 94 % -- --   03/02/21 0100 141/66 -- -- 79 18 93 % -- --   03/02/21 0000 132/67 -- -- 86 18 93 % -- --   03/01/21 2330 138/67 -- -- 81 -- 94 % -- --   03/01/21 2315 138/71 -- -- 78 18 95 % -- --   03/01/21 2245 148/64 -- -- 78 -- 94 % -- --   03/01/21 2215 127/81 -- -- 81 -- 96 % -- --   03/01/21 2200 131/58 -- -- 77 -- 94 % -- --   03/01/21 2146 -- -- -- 77 -- 97 % -- --   03/01/21 2145 131/64 -- -- -- -- -- -- --   03/01/21 2002 137/51 98.2 °F (36.8 °C) -- 78 16 98 % 157.5 cm (62\") 58.1 kg (128 lb)     Vitals signs reviewed.   Constitutional:       Appearance: Well-developed and not in distress. Cachectic. Ill-appearing and chronically ill-appearing.      Interventions: Nasal cannula in place.   HENT:      Head: Normocephalic and atraumatic.   Neck:      Musculoskeletal: Normal range of motion and neck supple.   Pulmonary:      Effort: Pulmonary effort is normal. No tachypnea.      Breath sounds: Examination of the left-upper field reveals decreased breath sounds. Examination of the left-middle field reveals decreased breath sounds. Decreased breath sounds present.   Chest:      Chest wall: Not tender to palpatation. Not reproducing clinical pain.   Cardiovascular:      Normal rate. Regular rhythm.   Edema:     Peripheral edema absent.   Abdominal:      General: Bowel sounds are normal. There is no distension.      Palpations: Abdomen is soft.      Tenderness: There is no abdominal tenderness.   Skin:     General: Skin is warm and dry.   Neurological:      Mental Status: Alert, oriented to person, place, and time and oriented to person, place and time.         Results Review:   I reviewed the patient's new clinical results.    Lab Results (last 72 hours)     Procedure Component Value " Units Date/Time    TSH [[id number]]  (Normal) Collected: 03/02/21 0130    Specimen: Blood Updated: 03/02/21 0710     TSH 2.440 uIU/mL     T4, Free [[id number]]  (Normal) Collected: 03/02/21 0130    Specimen: Blood Updated: 03/02/21 0710     Free T4 1.45 ng/dL     Narrative:      Results may be falsely increased if patient taking Biotin.      Phosphorus [[id number]]  (Abnormal) Collected: 03/02/21 0130    Specimen: Blood Updated: 03/02/21 0701     Phosphorus 2.0 mg/dL     Amylase [[id number]]  (Abnormal) Collected: 03/02/21 0130    Specimen: Blood Updated: 03/02/21 0701     Amylase 105 U/L     Lipase [[id number]]  (Normal) Collected: 03/02/21 0130    Specimen: Blood Updated: 03/02/21 0659     Lipase 34 U/L     COVID PRE-OP / PRE-PROCEDURE SCREENING ORDER (NO ISOLATION) - Swab, Nasal Cavity [[id number]]  (Normal) Collected: 03/02/21 0451    Specimen: Swab from Nasal Cavity Updated: 03/02/21 0559    Narrative:      The following orders were created for panel order COVID PRE-OP / PRE-PROCEDURE SCREENING ORDER (NO ISOLATION) - Swab, Nasal Cavity.  Procedure                               Abnormality         Status                     ---------                               -----------         ------                     COVID-19,[facility] [employer] IN-NUZHAT...[[id number]]  Normal              Final result                 Please view results for these tests on the individual orders.    COVID-19,[facility] [employer] IN-HOUSE,Nasal Swab No Transport Media 3-4 HR TAT - Swab, Nasal Cavity [[id number]]  (Normal) Collected: 03/02/21 0451    Specimen: Swab from Nasal Cavity Updated: 03/02/21 0559     COVID19 Not Detected    Narrative:      Fact sheet for providers: https://www.fda.gov/media/423885/download     Fact sheet for patients: https://www.fda.gov/media/573290/download    Test performed by PCR.    Consider negative results in combination with clinical observations, patient history, and epidemiological information.  Fact sheet for providers:  https://www.fda.gov/media/198432/download     Fact sheet for patients: https://www.fda.gov/media/448814/download    Test performed by PCR.    Consider negative results in combination with clinical observations, patient history, and epidemiological information.    Urinalysis, Microscopic Only - Urine, Clean Catch [[id number]]  (Abnormal) Collected: 03/02/21 0135    Specimen: Urine, Clean Catch Updated: 03/02/21 0215     RBC, UA 0-2 /HPF      WBC, UA 6-12 /HPF      Bacteria, UA 1+ /HPF      Squamous Epithelial Cells, UA 21-30 /HPF      Yeast, UA       Small/1+ Budding Yeast w/Hyphae     /HPF     Hyaline Casts, UA 0-2 /LPF      Methodology Manual Light Microscopy    Theophylline Level [[id number]]  (Normal) Collected: 03/02/21 0130    Specimen: Blood Updated: 03/02/21 0209     Theophylline Level 13.2 mcg/mL     Troponin [[id number]]  (Abnormal) Collected: 03/02/21 0130    Specimen: Blood Updated: 03/02/21 0208     Troponin T 0.036 ng/mL     Narrative:      Troponin T Reference Range:  <= 0.03 ng/mL-   Negative for AMI  >0.03 ng/mL-     Abnormal for myocardial necrosis.  Clinicians would have to utilize clinical acumen, EKG, Troponin and serial changes to determine if it is an Acute Myocardial Infarction or myocardial injury due to an underlying chronic condition.       Results may be falsely decreased if patient taking Biotin.      Urinalysis With Microscopic If Indicated (No Culture) - Urine, Clean Catch [[id number]]  (Abnormal) Collected: 03/02/21 0135    Specimen: Urine, Clean Catch Updated: 03/02/21 0207     Color, UA Yellow     Appearance, UA Clear     pH, UA 5.5     Specific Gravity, UA 1.020     Glucose, UA Negative     Ketones, UA Negative     Bilirubin, UA Negative     Blood, UA Negative     Protein, UA 30 mg/dL (1+)     Leuk Esterase, UA Negative     Nitrite, UA Negative     Urobilinogen, UA 0.2 E.U./dL    Comprehensive Metabolic Panel [[id number]]  (Abnormal) Collected: 03/01/21 2058    Specimen: Blood Updated:  03/01/21 2132     Glucose 119 mg/dL      BUN 55 mg/dL      Creatinine 2.01 mg/dL      Sodium 137 mmol/L      Potassium 5.0 mmol/L      Chloride 102 mmol/L      CO2 23.0 mmol/L      Calcium 10.4 mg/dL      Total Protein 7.5 g/dL      Albumin 4.30 g/dL      ALT (SGPT) 47 U/L      AST (SGOT) 47 U/L      Alkaline Phosphatase 146 U/L      Total Bilirubin 0.2 mg/dL      eGFR Non African Amer 24 mL/min/1.73      Globulin 3.2 gm/dL      A/G Ratio 1.3 g/dL      BUN/Creatinine Ratio 27.4     Anion Gap 12.0 mmol/L     Narrative:      GFR Normal >60  Chronic Kidney Disease <60  Kidney Failure <15      Troponin [[id number]]  (Abnormal) Collected: 03/01/21 2058    Specimen: Blood Updated: 03/01/21 2131     Troponin T 0.048 ng/mL     Narrative:      Troponin T Reference Range:  <= 0.03 ng/mL-   Negative for AMI  >0.03 ng/mL-     Abnormal for myocardial necrosis.  Clinicians would have to utilize clinical acumen, EKG, Troponin and serial changes to determine if it is an Acute Myocardial Infarction or myocardial injury due to an underlying chronic condition.       Results may be falsely decreased if patient taking Biotin.      Magnesium [[id number]]  (Normal) Collected: 03/01/21 2058    Specimen: Blood Updated: 03/01/21 2126     Magnesium 2.4 mg/dL     POC Glucose Once [[id number]]  (Normal) Collected: 03/01/21 2057    Specimen: Blood Updated: 03/01/21 2109     Glucose 110 mg/dL      Comment: : [id number] Gee ID: [id number]       CBC & Differential [[id number]]  (Abnormal) Collected: 03/01/21 2058    Specimen: Blood Updated: 03/01/21 2107    Narrative:      The following orders were created for panel order CBC & Differential.  Procedure                               Abnormality         Status                     ---------                               -----------         ------                     CBC Auto Differential[[id number]]        Abnormal            Final result                 Please view results for these  tests on the individual orders.    CBC Auto Differential [[id number]]  (Abnormal) Collected: 03/01/21 2058    Specimen: Blood Updated: 03/01/21 2107     WBC 6.10 10*3/mm3      RBC 4.31 10*6/mm3      Hemoglobin 11.7 g/dL      Hematocrit 36.6 %      MCV 84.9 fL      MCH 27.1 pg      MCHC 32.0 g/dL      RDW 17.4 %      RDW-SD 53.6 fl      MPV 12.4 fL      Platelets 275 10*3/mm3      Neutrophil % 66.7 %      Lymphocyte % 20.7 %      Monocyte % 9.0 %      Eosinophil % 2.5 %      Basophil % 0.8 %      Immature Grans % 0.3 %      Neutrophils, Absolute 4.07 10*3/mm3      Lymphocytes, Absolute 1.26 10*3/mm3      Monocytes, Absolute 0.55 10*3/mm3      Eosinophils, Absolute 0.15 10*3/mm3      Basophils, Absolute 0.05 10*3/mm3      Immature Grans, Absolute 0.02 10*3/mm3      nRBC 0.0 /100 WBC           Imaging Results (Last 72 Hours)     Procedure Component Value Units Date/Time    XR Hip With or Without Pelvis 2 - 3 View Right [[id number]] Collected: 03/02/21 1013     Updated: 03/02/21 1018    Narrative:      HISTORY: Right hip pain     Right hip: Frontal view the pelvis with frontal frog-leg lateral views  of the right hip are obtained.     There is moderate bilateral hip joint space narrowing. A round  calcification left of L5 transverse process corresponds to a calcified  phlebolith based on CT abdomen pelvis 3/2/2021. Smaller phleboliths  present within left lower pelvis. No fracture or dislocation is  identified. No suspicious focal bony lesions.       Impression:      1. Mild to moderate arthritic narrowing of the hip joints with no acute  osseous pathology  This report was finalized on 03/02/2021 10:14 by Dr. oMnika Bal MD.    CT Abdomen Pelvis Without Contrast [[id number]] Collected: 03/02/21 0750     Updated: 03/02/21 0800    Narrative:      CT ABDOMEN PELVIS WO CONTRAST- 3/2/2021 2:05 AM CST     HISTORY: Abdominal abscess/infection suspected      COMPARISON: 2/25/2021      DLP: 244 mGy cm. Automated exposure  control was utilized to diminish  patient radiation dose.     TECHNIQUE: Noncontrast enhanced images of the abdomen and pelvis  obtained without oral contrast.      FINDINGS:   Bibasilar atelectasis is present. There is mild cardiomegaly. There is a  trace pericardial effusion or pericardial thickening. A large hiatal  hernia is present..      LIVER: No focal liver lesion.      BILIARY SYSTEM: The gallbladder has been surgically removed. There is no  evidence of intra or extrahepatic dilatation..      PANCREAS: No focal pancreatic lesion.      SPLEEN: Unremarkable.      KIDNEYS AND ADRENALS: There is a 5 mm nonobstructing calculus lower pole  calyx right kidney. The adrenals are unremarkable. Nodes of discrete  renal mass or left-sided nephrolithiasis..  The ureters are decompressed  and normal in appearance.     RETROPERITONEUM: No mass, lymphadenopathy or hemorrhage.      GI TRACT: There is moderate constipation with increased stool throughout  the colon. There is a ventral wall hernia containing a segment of  transverse colon without evidence of incarceration or obstruction.  Diverticulosis is noted the descending and sigmoid colon with no  radiographic evidence of acute diverticulitis.. The appendix is  surgically absent..     OTHER: There is no mesenteric mass, lymphadenopathy or fluid collection.  The osseous structures and soft tissues demonstrate no worrisome  lesions. Grade 2 spondylolisthesis is noted of L5 on S1. There is mild  retrolisthesis of L1 on L2. No acute fractures are present. No  suspicious lesions are present.      PELVIS: The patient's undergone prior hysterectomy. There is no free  fluid or pelvic mass demonstrated.. The urinary bladder is normal in  appearance.       Impression:      1. Moderate constipation. There is a ventral wall hernia containing a  segment of the transverse colon without evidence of incarceration or  obstruction. The patient is status post hysterectomy,  cholecystectomy  and appendectomy.  2. Large hiatal hernia.  3. Nonobstructing calculus lower pole calyx right kidney. No evidence of  ureteral stone or obstructive uropathy..         This report was finalized on 03/02/2021 07:56 by Dr. Luis Darnell MD.    XR Chest 1 View [[id number]] Collected: 03/01/21 2153     Updated: 03/01/21 2156    Narrative:      EXAM: XR CHEST 1 VW- 3/1/2021 9:44 PM CST     HISTORY: Weak/Dizzy/AMS triage protocol       COMPARISON: None.     TECHNIQUE: Frontal radiograph of the chest     FINDINGS:   Chronic changes present in the pulmonary parenchyma. Mild hyperinflation  is noted.. Cardiac silhouette is normal. Vascular calcifications present  aortic arch and descending thoracic aorta..      The osseous structures and surrounding soft tissues demonstrate no acute  abnormality.          Impression:      1. Chronic change in the pulmonary parenchyma with no acute  cardiopulmonary process. Skin fold projects over the left chest.        This report was finalized on 03/01/2021 21:53 by Dr. Herman Feliciano MD.          Assessment     1.  Elevated troponin: Type II elevation in the setting of EHSAN  2.  Acute kidney injury: Likely secondary to #3 #4.  3.  Intractable nausea and vomiting  4.  Poor Appetite  5.  Weight loss  6.  Hip pain: right  7.  Asthma: established problem  8.  Diabetes mellitus, type II: established problem      Plan     Cardiology was asked to evaluate this patient due to an elevated troponin that was checked during her evaluation in the emergency department in the absence of chest pain.  The patient denies any symptoms of chest pain, chest pressure, chest discomfort.  She has had intermittent lower abdominal pain with poor appetite, nausea, vomiting, and weight loss over the course of several months.    I suspect the troponin order was placed as a part of a lab order set, rather than provoked by a particular symptom as the patient denies any chest pain, pressure, or  discomfort on presentation or currently.  Her elevated troponin is likely a type II elevation in the setting of acute kidney injury.  She has had an EKG without any acute ST-T findings.  No further cardiac work-up would be indicated at this time as the patient has no cardiac complaints suggestive of Acute Coronary Syndrome.  Recommend proceeding with endoscopy as planned.  Cardiology will sign off.  Please recall if needed.        Electronically signed by LUI Luu, 03/02/21, 12:07 PM CST.      Please note this cardiology consultation note is the result of a face to face consultation with the patient, in addition to reviewing medical records at length by myself, LUI Luu.       Time:  Approximately 40 minutes

## 2021-03-02 NOTE — H&P (VIEW-ONLY)
[facility] GASTROENTEROLOGY              Initial Inpatient Consult Note  Rosemary Petersen  1940    Referring Provider: Richie Peng MD    Admission: 3/1/2021  Consult date: 3/2/2021  Chief complaint: nausea/constipation and wt loss    Subjective     History of present illness:  Pt is a 80 year old female admitted with multiple complaints to include right hip, nausea vomiting, constipation and lower abdominal pain, constipation and wt loss. She has underlying hx of COPD, asthma, back pain, and type II diabetes. She describes her pain as a severe stabbing pain. She has lost approx 20 lbs in the past month. No appetite she says nothing tastes good. She is on iron and says she has had black stools. She has taken Pepto at times but not regularly. She has been on Prilosec as well for many years for reflux. She has also been taking Bentyl for underlying IBS symptoms. 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 No workup was performed at that time per her grand daughter. Dr Almaguer has seen her this admission.     Liver functions elevated this admission ALT 47, AST 47, ALKP 146, T bili 0.2. No known hx for liver disease. Amylase and lipase normal. No associated symptoms.     This admission CT shows hiatal hernia, large ventral hernia with transverse colon involvement no incarceration, diverticulosis. She has constipation noted. She has been on Percocet for lower back pain since last Thursday.     Past Medical History:  Past Medical History:   Diagnosis Date   • Asthma    • Back pain    • Back pain    • COPD (chronic obstructive pulmonary disease) (CMS/HCC)    • Diabetes mellitus (CMS/HCC)    • Hypertension        Past Surgical History:  Past Surgical History:   Procedure Laterality Date   • APPENDECTOMY     • CHOLECYSTECTOMY     • HYSTERECTOMY         Social History:   Social History      Tobacco Use   • Smoking status: Former Smoker     Years: 0.00   • Smokeless tobacco: Never Used   • Tobacco comment: QUIT 25 YEARS AGO   Substance Use Topics   • Alcohol use: Never     Frequency: Never        Family History:  History reviewed. No pertinent family history.  No family hx for colon cancer  Home Meds:  Medications Prior to Admission   Medication Sig Dispense Refill Last Dose   • albuterol (ACCUNEB) 1.25 MG/3ML nebulizer solution Take 1 ampule by nebulization Every 6 (Six) Hours As Needed for Wheezing.      • allopurinol (ZYLOPRIM) 300 MG tablet Take 300 mg by mouth Daily.      • ALPRAZolam (XANAX) 0.25 MG tablet Take 0.25 mg by mouth As Needed for Anxiety.      • ascorbic acid (VITAMIN C) 500 MG tablet Take 500 mg by mouth 2 (Two) Times a Day.      • aspirin 81 MG chewable tablet Chew 81 mg Daily.      • Butalbital-APAP-Caffeine (Esgic) -40 MG per capsule Take 1 capsule by mouth Every 4 (Four) Hours As Needed for Headache.      • dicyclomine (BENTYL) 10 MG capsule Take 10 mg by mouth 4 (Four) Times a Day Before Meals & at Bedtime.      • doxycycline (VIBRAMYCIN) 100 MG capsule Take 100 mg by mouth Daily.      • Ezetimibe (ZETIA PO) Take 10 mg by mouth.      • ferrous sulfate 325 (65 FE) MG tablet Take 325 mg by mouth 2 (two) times a day.      • indapamide (LOZOL) 1.25 MG tablet Take 1.25 mg by mouth Every Morning.      • levalbuterol (XOPENEX HFA) 45 MCG/ACT inhaler Inhale 2 puffs Every 4 (Four) Hours As Needed for Wheezing.      • levothyroxine (SYNTHROID, LEVOTHROID) 50 MCG tablet Take 50 mcg by mouth Daily.      • lisinopril (PRINIVIL,ZESTRIL) 20 MG tablet Take 20 mg by mouth Daily.      • montelukast (SINGULAIR) 10 MG tablet Take 10 mg by mouth Every Night.      • naproxen (NAPROSYN) 500 MG tablet Take 500 mg by mouth 2 (Two) Times a Day With Meals.      • omeprazole (priLOSEC) 20 MG capsule Take 20 mg by mouth 2 (two) times a day.      • oxyCODONE-acetaminophen (PERCOCET) 5-325 MG per  "tablet Take 1 tablet by mouth Every 6 (Six) Hours As Needed for Severe Pain . 10 tablet 0    • promethazine (PHENERGAN) 6.25 MG/5ML syrup Take  by mouth 4 (Four) Times a Day As Needed for Nausea or Vomiting.      • promethazine-codeine (PHENERGAN with CODEINE) 6.25-10 MG/5ML syrup Take 5 mL by mouth Every 4 (Four) Hours As Needed for Cough.      • rosuvastatin (Crestor) 40 MG tablet Take 40 mg by mouth Daily.      • theophylline (UNIPHYL) 600 MG 24 hr tablet Take 600 mg by mouth 2 (two) times a day.      • VITAMIN D PO Take 1.25 mg by mouth 1 (One) Time Per Week.          Current Meds:   Hospital Medications (active)       Dose Frequency Start End    acetaminophen (TYLENOL) 160 MG/5ML solution 650 mg 650 mg Every 4 Hours PRN 3/2/2021     Admin Instructions: Do not exceed 4 grams of acetaminophen in a 24 hr period. Max dose of 2gm for AST/ALT greater than 120 units/L<BR><BR><BR>If given for pain, use the following pain scale: <BR>Mild Pain = Pain Score of 1-3, CPOT 1-2<BR>Moderate Pain = Pain Score of 4-6, CPOT 3-4<BR>Severe Pain = Pain Score of 7-10, CPOT 5-8    Route: Oral    Linked Group 1: \"Or\" Linked Group Details        acetaminophen (TYLENOL) suppository 650 mg 650 mg Every 4 Hours PRN 3/2/2021     Admin Instructions: Do not exceed 4 grams of acetaminophen in a 24 hr period. Max dose of 2gm for AST/ALT greater than 120 units/L<BR><BR><BR>If given for pain, use the following pain scale: <BR>Mild Pain = Pain Score of 1-3, CPOT 1-2<BR>Moderate Pain = Pain Score of 4-6, CPOT 3-4<BR>Severe Pain = Pain Score of 7-10, CPOT 5-8    Route: Rectal    Linked Group 1: \"Or\" Linked Group Details        acetaminophen (TYLENOL) tablet 650 mg 650 mg Every 4 Hours PRN 3/2/2021     Admin Instructions: Do not exceed 4 grams of acetaminophen in a 24 hr period. Max dose of 2gm for AST/ALT greater than 120 units/L<BR><BR><BR>If given for pain, use the following pain scale: <BR>Mild Pain = Pain Score of 1-3, CPOT 1-2<BR>Moderate Pain " "= Pain Score of 4-6, CPOT 3-4<BR>Severe Pain = Pain Score of 7-10, CPOT 5-8    Route: Oral    Linked Group 1: \"Or\" Linked Group Details        albuterol (PROVENTIL) nebulizer solution 0.042% 1.25 mg/3mL 1.25 mg Every 6 Hours PRN 3/2/2021     Route: Nebulization    allopurinol (ZYLOPRIM) tablet 300 mg 300 mg Daily 3/2/2021     Admin Instructions: ([facility]) Take with food if GI upset occurs.    Route: Oral    ALPRAZolam (XANAX) tablet 0.25 mg 0.25 mg Every 8 Hours PRN 3/2/2021     Admin Instructions:  Caution: Look alike/sound alike drug alert.   Avoid grapefruit juice    Route: Oral    butalbital-acetaminophen-caffeine (FIORICET, ESGIC) -40 MG per tablet 1 tablet 1 tablet Every 6 Hours PRN 3/2/2021     Admin Instructions: Do not exceed 4g of acetaminophen in a 24 hour period.  Maximum 6 doses per 24 hours. Max dose of 2gm for AST/ALT greater than 120 units/L    Route: Oral    levothyroxine (SYNTHROID, LEVOTHROID) tablet 50 mcg 50 mcg Every Early Morning 3/2/2021     Admin Instructions: Take on empty stomach.    Route: Oral    montelukast (SINGULAIR) tablet 10 mg 10 mg Nightly 3/2/2021     Route: Oral    Morphine sulfate (PF) injection 2 mg 2 mg Every 4 Hours PRN 3/2/2021 3/9/2021    Admin Instructions: <BR><BR>Caution: Look alike/sound alike drug alert<BR><BR>If given for pain, use the following pain scale:<BR>Mild Pain = Pain Score of 1-3, CPOT 1-2<BR>Moderate Pain = Pain Score of 4-6, CPOT 3-4<BR>Severe Pain = Pain Score of 7-10, CPOT 5-8    Route: Intravenous    Linked Group 2: \"And\" Linked Group Details        naloxone (NARCAN) injection 0.4 mg 0.4 mg Every 5 Minutes PRN 3/2/2021     Admin Instructions: If respiratory rate is less than 8 breaths/minute or patient is difficult to arouse stop any narcotics and contact physician. <BR>Administer slow IV push. Repeat as ordered until patient's respiratory rate is greater than 12 breaths/minute.    Route: Intravenous    Linked Group 2: \"And\" Linked Group Details     " "   ondansetron (ZOFRAN) injection 4 mg 4 mg Every 6 Hours PRN 3/2/2021     Admin Instructions: If BOTH ondansetron (ZOFRAN) and promethazine (PHENERGAN) are ordered use ondansetron first and THEN promethazine IF ondansetron is ineffective.    Route: Intravenous    Linked Group 3: \"Or\" Linked Group Details        ondansetron (ZOFRAN) tablet 4 mg 4 mg Every 6 Hours PRN 3/2/2021     Admin Instructions: If BOTH ondansetron (ZOFRAN) and promethazine (PHENERGAN) are ordered use ondansetron first and THEN promethazine IF ondansetron is ineffective.    Route: Oral    Linked Group 3: \"Or\" Linked Group Details        pantoprazole (PROTONIX) 40 mg in 100mL NS IVPB 8 mg/hr Continuous 3/2/2021     Admin Instructions: Break seal and mix to activate vial before use    Route: Intravenous    sodium chloride 0.9 % flush 10 mL 10 mL As Needed 3/1/2021     Route: Intravenous    Cosign for Ordering: Accepted by Nick Au MD on 3/1/2021  8:37 PM    sodium chloride 0.9 % flush 10 mL 10 mL Every 12 Hours Scheduled 3/2/2021     Route: Intravenous    sodium chloride 0.9 % flush 10 mL 10 mL As Needed 3/2/2021     Route: Intravenous    sodium chloride 0.9 % infusion 100 mL/hr Continuous 3/2/2021     Route: Intravenous          Allergies:  No Known Allergies    Review of Systems  Review of Systems   Constitutional: Positive for fatigue and unexpected weight change (20 lbs in 2 months). Negative for activity change, appetite change, chills, diaphoresis and fever.   HENT: Negative for ear pain, hearing loss, mouth sores, sore throat, trouble swallowing and voice change.    Eyes: Negative.    Respiratory: Negative for cough, choking, shortness of breath and wheezing.    Cardiovascular: Negative for chest pain and palpitations.   Gastrointestinal: Positive for abdominal pain, constipation, nausea and vomiting. Negative for blood in stool and diarrhea.   Endocrine: Negative for cold intolerance and heat intolerance.   Genitourinary: " Negative for decreased urine volume, dysuria, frequency, hematuria and urgency.   Musculoskeletal: Positive for back pain. Negative for gait problem and myalgias.   Skin: Negative for color change, pallor and rash.   Allergic/Immunologic: Negative for food allergies and immunocompromised state.   Neurological: Positive for weakness. Negative for dizziness, tremors, seizures, syncope, light-headedness, numbness and headaches.   Hematological: Negative for adenopathy. Does not bruise/bleed easily.   Psychiatric/Behavioral: Negative for agitation and confusion. The patient is not nervous/anxious.    All other systems reviewed and are negative.       Objective     Vital Signs  Temp:  [97.9 °F (36.6 °C)-98.6 °F (37 °C)] 98.6 °F (37 °C)  Heart Rate:  [69-86] 69  Resp:  [16-19] 18  BP: (127-173)/(51-96) 148/63  Body mass index is 23.37 kg/m².    Physical Exam:  General Appearance:    Alert, cooperative, in no acute distress   Head:    Normocephalic, without obvious abnormality, atraumatic   Eyes:            Lids and lashes normal, conjunctivae and sclerae normal, no icterus, conjunctival pallor   Throat:   No oral lesions, no thrush, oral mucosa moist, posterior oropharynx clear   Neck:   No adenopathy, supple, trachea midline, no thyromegaly, no   carotid bruit, no JVD   Lungs:     Clear to auscultation,respirations regular, even and            unlabored    Heart:    Regular rhythm and normal rate, normal S1 and S2,           no   murmur   Chest Wall:    No abnormalities observed   Abdomen:     Normal bowel sounds, no masses, no organomegaly,     Soft nontender on exam, nondistended, no guarding, no rebound      tenderness   Rectal:     Deferred   Extremities:   no edema, no cyanosis   Skin:   No open lesions, bruising or rash   Lymph nodes:   No palpable cervical adenopathy   Psychiatric:  Judgment and insight: normal   Orientation to person place and time: normal   Mood and affect: normal     Results Review:  Results  from last 7 days   Lab Units 03/01/21 2058 02/25/21 2026   WBC 10*3/mm3 6.10 5.69   HEMOGLOBIN g/dL 11.7* 11.2*   HEMATOCRIT % 36.6 35.5   PLATELETS 10*3/mm3 275 255       Results from last 7 days   Lab Units 03/01/21 2058 02/25/21 2026   SODIUM mmol/L 137 137   POTASSIUM mmol/L 5.0 4.7   CHLORIDE mmol/L 102 105   CO2 mmol/L 23.0 23.0   BUN mg/dL 55* 43*   CREATININE mg/dL 2.01* 1.50*   CALCIUM mg/dL 10.4 10.3   BILIRUBIN mg/dL 0.2  --    ALK PHOS U/L 146*  --    ALT (SGPT) U/L 47*  --    AST (SGOT) U/L 47*  --    GLUCOSE mg/dL 119* 114*              Radiology Review:  Imaging Results (Last 72 Hours)     Procedure Component Value Units Date/Time    XR Hip With or Without Pelvis 2 - 3 View Right [[id number]] Resulted: 03/02/21 1013     Updated: 03/02/21 1014    CT Abdomen Pelvis Without Contrast [[id number]] Collected: 03/02/21 0750     Updated: 03/02/21 0800    Narrative:      CT ABDOMEN PELVIS WO CONTRAST- 3/2/2021 2:05 AM CST     HISTORY: Abdominal abscess/infection suspected      COMPARISON: 2/25/2021      DLP: 244 mGy cm. Automated exposure control was utilized to diminish  patient radiation dose.     TECHNIQUE: Noncontrast enhanced images of the abdomen and pelvis  obtained without oral contrast.      FINDINGS:   Bibasilar atelectasis is present. There is mild cardiomegaly. There is a  trace pericardial effusion or pericardial thickening. A large hiatal  hernia is present..      LIVER: No focal liver lesion.      BILIARY SYSTEM: The gallbladder has been surgically removed. There is no  evidence of intra or extrahepatic dilatation..      PANCREAS: No focal pancreatic lesion.      SPLEEN: Unremarkable.      KIDNEYS AND ADRENALS: There is a 5 mm nonobstructing calculus lower pole  calyx right kidney. The adrenals are unremarkable. Nodes of discrete  renal mass or left-sided nephrolithiasis..  The ureters are decompressed  and normal in appearance.     RETROPERITONEUM: No mass, lymphadenopathy or hemorrhage.       GI TRACT: There is moderate constipation with increased stool throughout  the colon. There is a ventral wall hernia containing a segment of  transverse colon without evidence of incarceration or obstruction.  Diverticulosis is noted the descending and sigmoid colon with no  radiographic evidence of acute diverticulitis.. The appendix is  surgically absent..     OTHER: There is no mesenteric mass, lymphadenopathy or fluid collection.  The osseous structures and soft tissues demonstrate no worrisome  lesions. Grade 2 spondylolisthesis is noted of L5 on S1. There is mild  retrolisthesis of L1 on L2. No acute fractures are present. No  suspicious lesions are present.      PELVIS: The patient's undergone prior hysterectomy. There is no free  fluid or pelvic mass demonstrated.. The urinary bladder is normal in  appearance.       Impression:      1. Moderate constipation. There is a ventral wall hernia containing a  segment of the transverse colon without evidence of incarceration or  obstruction. The patient is status post hysterectomy, cholecystectomy  and appendectomy.  2. Large hiatal hernia.  3. Nonobstructing calculus lower pole calyx right kidney. No evidence of  ureteral stone or obstructive uropathy..         This report was finalized on 03/02/2021 07:56 by Dr. Luis Darnell MD.    XR Chest 1 View [[id number]] Collected: 03/01/21 2153     Updated: 03/01/21 2156    Narrative:      EXAM: XR CHEST 1 VW- 3/1/2021 9:44 PM CST     HISTORY: Weak/Dizzy/AMS triage protocol       COMPARISON: None.     TECHNIQUE: Frontal radiograph of the chest     FINDINGS:   Chronic changes present in the pulmonary parenchyma. Mild hyperinflation  is noted.. Cardiac silhouette is normal. Vascular calcifications present  aortic arch and descending thoracic aorta..      The osseous structures and surrounding soft tissues demonstrate no acute  abnormality.          Impression:      1. Chronic change in the pulmonary parenchyma with no  acute  cardiopulmonary process. Skin fold projects over the left chest.        This report was finalized on 03/01/2021 21:53 by Dr. Herman Feliciano MD.          Assessment/Plan         Acute kidney injury (EHSAN) with acute tubular necrosis (ATN) (CMS/HCC)    Weight loss    Intractable nausea and vomiting    Low back pain      1. Persistent Nausea, vomiting, loss of appetite   2. Wt loss 20 lbs in 2 months  3. constipation  4. Lower abdominal pain  5. Abnormal CT scan large ventral wall hernia, hiatal hernia, constipation  6. EHSAN BUN 55, Creat 2.01  7. Elevated troponin on admission 0.048, trended down to 0.036.  8. Questionable mesenteric ischemia due to CT angio findings at outlying facility Dr Almaguer has seen today and no intervention is planned he felt patent given most recent CT findings.     Will perform Endoscopy to rule out upper GI related findings that could be contributing to her symptoms. R/B/I/A discussed and she is agreeable if cleared by Cardiology due to elevated troponin.  Continue PPI therapy  Recommend we discontinue Bentyl due to constipation  Will initiate Miralax.   The liver on CT was unremarkable will follow numbers workup can be outpatient for this.  Can plan outpatient follow up post discharge with me in the office for this.      EMR Dragon/transcription disclaimer: Much of this encounter note is electronic transcription/translation of spoken language to printed text. The electronic translation of spoken language may be erroneous, or at times, nonsensical words or phrases may be inadvertently transcribed. Although I have reviewed the note for such errors, some may still exist.  LUI Mar  [facility]astroenter[facility]  03/02/21  10:16 CST

## 2021-03-02 NOTE — ED NOTES
"Granddaughter states pt weighed 149 Jan 18th. Pt now weighs 128lbs. Pt is nauseated and vomits. Pt states everything she eats tastes sour. Family gives her anything she thinks shre can eat and she does eat some.  Pt saw PCP Jan 18th and the 22nd for these same symptoms. Pt was here Thursday for back pain with other family members. Granddaughter reports a decline in patient by her becoming weaker \"by the day\". Granddaughter states this all probably started about late summer/early fall but has worsened more in the last month. Feb 19th pt went to [place] and had CT abd pelvis without and it showed severe calcifications of abdominal aorta and severe calcifications of in the superior mesenteric artery and a incarcerated hiatus hernia. Granddaughter states they were told here Thursday pt hernia is not incarcerated and didn't say anything about calcifications. Pt states that is not why they are here but they are seeing a decline and being told differrent tidbits from different places and not getting any answers/improvement.      Kari Garcia RN  03/01/21 2218       Kari Garcia RN  03/01/21 2227    "

## 2021-03-02 NOTE — PLAN OF CARE
Goal Outcome Evaluation:  Plan of Care Reviewed With: patient  Progress: no change  Outcome Summary: Admitted to  with n/v and EHSAN. Creat 2.01, BUN 55. IVF. Prtonix gtt. NPO. Plans for endo today, but unable to perform d/t elevated troponin. Tele on; sinus. CT showed constipation and large hiatal hernia. Plans for miralax when pt can have nausea medicine. No emesis noted today, but pt medicated x1 for c/o nausea. Pt c/o R hip pain. R hip xray showed mild/mod arthritic narrowing. No CPR/Intub. Large hiatal hernia noted, but easily compressed. VSS. Oxygen at 2L applied after administering pain medicine. O2 maintained above 92% on 2L.

## 2021-03-02 NOTE — H&P
[facility] Medicine Services  HISTORY AND PHYSICAL    Date of Admission: 3/1/2021  Primary Care Physician: Daquan Bautista MD    Subjective     Chief Complaint: Nausea vomiting weight loss    History of Present Illness  Patient is an 80-year-old white female past medical history of COPD chronic back pain type 2 diabetes hypertension.  She presents with an off and on for a year history of vomiting as well as nausea.  She is lost a significant amount of weight in the past year and lost about another 20 pounds in the last month.  She has had imaging at different facilities [facility].  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  She had further imaging here which contradicted that did show some calcifications but no evidence of severe stenosis and a hernia that was not incarcerated.  She is continued to have vomiting.  It does not seem to be postprandial it seems to be random however she is unable to keep any weight.  She denies hematemesis she denies melena although she has dark stools from iron pills.  She comes back in again tonight because she is just continuing to decline and the family does not know what to do especially since they keep getting different opinions and impressions on her.  She has never had an upper endoscopy.  She has an appointment with Dr. Almaguer on Thursday of this week.  Tonight when she presents she actually has acute kidney injury with a creatinine of 2 and a BUN of 55.  Her alkaline phosphatase also elevated at 146 AST and ALT are minimally elevated as well.  Her troponin is slightly elevated but she denies chest pain is never had any cardiac issues.  EKG shows no significant acute changes.  CT scan tonight shows hiatal hernia, large ventral hernia with transverse colon and it which on exam is reducible and diverticulosis.  She also has increased stool burden she has been  put on Percocet recently for her intractable low back pain.  Her bowels last moved 2 days ago.  He has been constipated since being on Percocet.        Review of Systems   14 point review of systems negative except for as per HPI    Otherwise complete ROS reviewed and negative except as mentioned in the HPI.    Past Medical History:   Past Medical History:   Diagnosis Date   • Asthma    • Back pain    • Back pain    • COPD (chronic obstructive pulmonary disease) (CMS/[facility])    • Diabetes mellitus (CMS/[facility])    • Hypertension      Past Surgical History:  Past Surgical History:   Procedure Laterality Date   • APPENDECTOMY     • CHOLECYSTECTOMY     • HYSTERECTOMY       Social History:  reports that she has quit smoking. She quit after 0.00 years of use. She does not have any smokeless tobacco history on file. She reports that she does not drink alcohol or use drugs.    Family History: Hypertension    Allergies:  No Known Allergies  Medications:  Prior to Admission medications    Medication Sig Start Date End Date Taking? Authorizing Provider   albuterol (ACCUNEB) 1.25 MG/3ML nebulizer solution Take 1 ampule by nebulization Every 6 (Six) Hours As Needed for Wheezing.   Yes Rhianna Worley MD   allopurinol (ZYLOPRIM) 300 MG tablet Take 300 mg by mouth Daily.   Yes Rhianna Worley MD   ALPRAZolam (XANAX) 0.25 MG tablet Take 0.25 mg by mouth As Needed for Anxiety.   Yes Rhianna Worley MD   ascorbic acid (VITAMIN C) 500 MG tablet Take 500 mg by mouth 2 (Two) Times a Day.   Yes Rhianna Worley MD   aspirin 81 MG chewable tablet Chew 81 mg Daily.   Yes Rhianna Worley MD   Butalbital-APAP-Caffeine (Esgic) -40 MG per capsule Take 1 capsule by mouth Every 4 (Four) Hours As Needed for Headache.   Yes Rhianna Worley MD   dicyclomine (BENTYL) 10 MG capsule Take 10 mg by mouth 4 (Four) Times a Day Before Meals & at Bedtime.   Yes Rhianna Worley MD   doxycycline (VIBRAMYCIN) 100 MG  capsule Take 100 mg by mouth Daily.   Yes Rhianna Worley MD   Ezetimibe (ZETIA PO) Take 10 mg by mouth.   Yes Rhianna Worley MD   ferrous sulfate 325 (65 FE) MG tablet Take 325 mg by mouth 2 (two) times a day.   Yes Rhianna Worley MD   indapamide (LOZOL) 1.25 MG tablet Take 1.25 mg by mouth Every Morning.   Yes Rhianna Worley MD   levalbuterol (XOPENEX HFA) 45 MCG/ACT inhaler Inhale 2 puffs Every 4 (Four) Hours As Needed for Wheezing.   Yes Rhianna Worley MD   levothyroxine (SYNTHROID, LEVOTHROID) 50 MCG tablet Take 50 mcg by mouth Daily.   Yes Rhianna Worley MD   lisinopril (PRINIVIL,ZESTRIL) 20 MG tablet Take 20 mg by mouth Daily.   Yes Rhianna Worley MD   montelukast (SINGULAIR) 10 MG tablet Take 10 mg by mouth Every Night.   Yes Rhianna Worley MD   naproxen (NAPROSYN) 500 MG tablet Take 500 mg by mouth 2 (Two) Times a Day With Meals.   Yes Rhianna Worley MD   omeprazole (priLOSEC) 20 MG capsule Take 20 mg by mouth 2 (two) times a day.   Yes Rhianna Worley MD   oxyCODONE-acetaminophen (PERCOCET) 5-325 MG per tablet Take 1 tablet by mouth Every 6 (Six) Hours As Needed for Severe Pain . 2/25/21  Yes Scooter Carlson MD   promethazine (PHENERGAN) 6.25 MG/5ML syrup Take  by mouth 4 (Four) Times a Day As Needed for Nausea or Vomiting.   Yes Rhianna Worley MD   promethazine-codeine (PHENERGAN with CODEINE) 6.25-10 MG/5ML syrup Take 5 mL by mouth Every 4 (Four) Hours As Needed for Cough.   Yes Rhianna Worley MD   rosuvastatin (Crestor) 40 MG tablet Take 40 mg by mouth Daily.   Yes Rhianna Worley MD   theophylline (UNIPHYL) 600 MG 24 hr tablet Take 600 mg by mouth 2 (two) times a day.   Yes Rhianna Worley MD   VITAMIN D PO Take 1.25 mg by mouth 1 (One) Time Per Week.   Yes Rhianna Worley MD     Objective     Vital Signs: /68 (BP Location: Right arm, Patient Position: Lying)   Pulse 73   Temp 98.3 °F (36.8  "°C) (Oral)   Resp 18   Ht 157.5 cm (62\")   Wt 58.1 kg (128 lb)   SpO2 96%   BMI 23.41 kg/m²   Physical Exam   Gen.: Chronically ill-appearing white female in no acute distress  HEENT: Atraumatic, normocephalic.  Pupils equal, round, and reactive to light. Extraocular movements intact.  Sclerae anicteric.  External ears negative.  Mucous membranes moist.  Neck is supple without lymphadenopathy.  No JVD is noted.  No carotid bruits are auscultated.  Oropharynx is without erythema or exudate.   Chest: Clear to auscultation and percussion.  CV: Regular rate and rhythm.  Normal S1-S2.  No gallops, murmurs. or rubs.  Abdomen: Soft, nontender, nondistended.  Active bowel sounds,  No hepatosplenomegaly.  No masses.   ventral hernia superior part of abdomen that is reducible  Extremities: No clubbing, edema, or cyanosis.  Capillary refill is normal.  Pulses are 2+ and symmetric at radial and dorsalis pedis.  Posterior tibial pulses are intact and 2+ palpable.  Neuro: Patient is awake, alert, and oriented ×3.  Cranial nerves II through XII are grossly intact.  Motor is 5 out of 5 bilaterally.  DTRs are 2+ and symmetric bilaterally. Sensory exam is nonfocal  Skin: Warm, dry, and intact.  No evidence of breakdown.  Sensorium: Normal thought and affect    Nursing notes and vital signs reviewed        Results Reviewed:  Lab Results (last 24 hours)     Procedure Component Value Units Date/Time    COVID PRE-OP / PRE-PROCEDURE SCREENING ORDER (NO ISOLATION) - Swab, Nasal Cavity [[id number]]  (Normal) Collected: 03/02/21 0451    Specimen: Swab from Nasal Cavity Updated: 03/02/21 0559    Narrative:      The following orders were created for panel order COVID PRE-OP / PRE-PROCEDURE SCREENING ORDER (NO ISOLATION) - Swab, Nasal Cavity.  Procedure                               Abnormality         Status                     ---------                               -----------         ------                     COVID-19,[facility] " IN-NUZHAT...[[id number]]  Normal              Final result                 Please view results for these tests on the individual orders.    COVID-19,[facility] IN-HOUSE,Nasal Swab No Transport Media 3-4 HR TAT - Swab, Nasal Cavity [[id number]]  (Normal) Collected: 03/02/21 0451    Specimen: Swab from Nasal Cavity Updated: 03/02/21 0559     COVID19 Not Detected    Narrative:      Fact sheet for providers: https://www.fda.gov/media/964305/download     Fact sheet for patients: https://www.fda.gov/media/732188/download    Test performed by PCR.    Consider negative results in combination with clinical observations, patient history, and epidemiological information.  Fact sheet for providers: https://www.fda.gov/media/226942/download     Fact sheet for patients: https://www.fda.gov/media/738222/download    Test performed by PCR.    Consider negative results in combination with clinical observations, patient history, and epidemiological information.    Urinalysis, Microscopic Only - Urine, Clean Catch [[id number]]  (Abnormal) Collected: 03/02/21 0135    Specimen: Urine, Clean Catch Updated: 03/02/21 0215     RBC, UA 0-2 /HPF      WBC, UA 6-12 /HPF      Bacteria, UA 1+ /HPF      Squamous Epithelial Cells, UA 21-30 /HPF      Yeast, UA       Small/1+ Budding Yeast w/Hyphae     /HPF     Hyaline Casts, UA 0-2 /LPF      Methodology Manual Light Microscopy    Theophylline Level [[id number]]  (Normal) Collected: 03/02/21 0130    Specimen: Blood Updated: 03/02/21 0209     Theophylline Level 13.2 mcg/mL     Troponin [[id number]]  (Abnormal) Collected: 03/02/21 0130    Specimen: Blood Updated: 03/02/21 0208     Troponin T 0.036 ng/mL     Narrative:      Troponin T Reference Range:  <= 0.03 ng/mL-   Negative for AMI  >0.03 ng/mL-     Abnormal for myocardial necrosis.  Clinicians would have to utilize clinical acumen, EKG, Troponin and serial changes to determine if it is an Acute Myocardial Infarction or myocardial injury due to an underlying  chronic condition.       Results may be falsely decreased if patient taking Biotin.      Urinalysis With Microscopic If Indicated (No Culture) - Urine, Clean Catch [[id number]]  (Abnormal) Collected: 03/02/21 0135    Specimen: Urine, Clean Catch Updated: 03/02/21 0207     Color, UA Yellow     Appearance, UA Clear     pH, UA 5.5     Specific Gravity, UA 1.020     Glucose, UA Negative     Ketones, UA Negative     Bilirubin, UA Negative     Blood, UA Negative     Protein, UA 30 mg/dL (1+)     Leuk Esterase, UA Negative     Nitrite, UA Negative     Urobilinogen, UA 0.2 E.U./dL    [place] Draw [[id number]] Collected: 03/01/21 2058    Specimen: Blood Updated: 03/01/21 2200    Narrative:      The following orders were created for panel order [place] Draw.  Procedure                               Abnormality         Status                     ---------                               -----------         ------                     Light Blue Top[[id number]]                                   Final result               Green Top (Gel)[[id number]]                                  Final result               Lavender Top[[id number]]                                     Final result               Red Top[[id number]]                                          Final result                 Please view results for these tests on the individual orders.    Light Blue Top [[id number]] Collected: 03/01/21 2059    Specimen: Blood Updated: 03/01/21 2200     Extra Tube hold for add-on     Comment: Auto resulted       Green Top (Gel) [[id number]] Collected: 03/01/21 2058    Specimen: Blood Updated: 03/01/21 2200     Extra Tube Hold for add-ons.     Comment: Auto resulted.       Lavender Top [[id number]] Collected: 03/01/21 2058    Specimen: Blood Updated: 03/01/21 2200     Extra Tube hold for add-on     Comment: Auto resulted       Red Top [[id number]] Collected: 03/01/21 2059    Specimen: Blood Updated: 03/01/21 2200     Extra Tube Hold for add-ons.      Comment: Auto resulted.       Comprehensive Metabolic Panel [[id number]]  (Abnormal) Collected: 03/01/21 2058    Specimen: Blood Updated: 03/01/21 2132     Glucose 119 mg/dL      BUN 55 mg/dL      Creatinine 2.01 mg/dL      Sodium 137 mmol/L      Potassium 5.0 mmol/L      Chloride 102 mmol/L      CO2 23.0 mmol/L      Calcium 10.4 mg/dL      Total Protein 7.5 g/dL      Albumin 4.30 g/dL      ALT (SGPT) 47 U/L      AST (SGOT) 47 U/L      Alkaline Phosphatase 146 U/L      Total Bilirubin 0.2 mg/dL      eGFR Non African Amer 24 mL/min/1.73      Globulin 3.2 gm/dL      A/G Ratio 1.3 g/dL      BUN/Creatinine Ratio 27.4     Anion Gap 12.0 mmol/L     Narrative:      GFR Normal >60  Chronic Kidney Disease <60  Kidney Failure <15      Troponin [[id number]]  (Abnormal) Collected: 03/01/21 2058    Specimen: Blood Updated: 03/01/21 2131     Troponin T 0.048 ng/mL     Narrative:      Troponin T Reference Range:  <= 0.03 ng/mL-   Negative for AMI  >0.03 ng/mL-     Abnormal for myocardial necrosis.  Clinicians would have to utilize clinical acumen, EKG, Troponin and serial changes to determine if it is an Acute Myocardial Infarction or myocardial injury due to an underlying chronic condition.       Results may be falsely decreased if patient taking Biotin.      Magnesium [[id number]]  (Normal) Collected: 03/01/21 2058    Specimen: Blood Updated: 03/01/21 2126     Magnesium 2.4 mg/dL     POC Glucose Once [[id number]]  (Normal) Collected: 03/01/21 2057    Specimen: Blood Updated: 03/01/21 2109     Glucose 110 mg/dL      Comment: : [id number] Felicia Gee ID: [id number]       CBC & Differential [[id number]]  (Abnormal) Collected: 03/01/21 2058    Specimen: Blood Updated: 03/01/21 2107    Narrative:      The following orders were created for panel order CBC & Differential.  Procedure                               Abnormality         Status                     ---------                               -----------         ------                      CBC Auto Differential[[id number]]        Abnormal            Final result                 Please view results for these tests on the individual orders.    CBC Auto Differential [[id number]]  (Abnormal) Collected: 03/01/21 2058    Specimen: Blood Updated: 03/01/21 2107     WBC 6.10 10*3/mm3      RBC 4.31 10*6/mm3      Hemoglobin 11.7 g/dL      Hematocrit 36.6 %      MCV 84.9 fL      MCH 27.1 pg      MCHC 32.0 g/dL      RDW 17.4 %      RDW-SD 53.6 fl      MPV 12.4 fL      Platelets 275 10*3/mm3      Neutrophil % 66.7 %      Lymphocyte % 20.7 %      Monocyte % 9.0 %      Eosinophil % 2.5 %      Basophil % 0.8 %      Immature Grans % 0.3 %      Neutrophils, Absolute 4.07 10*3/mm3      Lymphocytes, Absolute 1.26 10*3/mm3      Monocytes, Absolute 0.55 10*3/mm3      Eosinophils, Absolute 0.15 10*3/mm3      Basophils, Absolute 0.05 10*3/mm3      Immature Grans, Absolute 0.02 10*3/mm3      nRBC 0.0 /100 WBC         Imaging Results (Last 24 Hours)     Procedure Component Value Units Date/Time    CT Abdomen Pelvis Without Contrast [[id number]] Resulted: 03/02/21 0213     Updated: 03/02/21 0236    XR Chest 1 View [[id number]] Collected: 03/01/21 2153     Updated: 03/01/21 2156    Narrative:      EXAM: XR CHEST 1 VW- 3/1/2021 9:44 PM CST     HISTORY: Weak/Dizzy/AMS triage protocol       COMPARISON: None.     TECHNIQUE: Frontal radiograph of the chest     FINDINGS:   Chronic changes present in the pulmonary parenchyma. Mild hyperinflation  is noted.. Cardiac silhouette is normal. Vascular calcifications present  aortic arch and descending thoracic aorta..      The osseous structures and surrounding soft tissues demonstrate no acute  abnormality.          Impression:      1. Chronic change in the pulmonary parenchyma with no acute  cardiopulmonary process. Skin fold projects over the left chest.        This report was finalized on 03/01/2021 21:53 by Dr. Herman Feliciano MD.        I have personally reviewed and  interpreted the radiology studies and ECG obtained at time of admission.     Assessment / Plan     Assessment:   Active Hospital Problems    Diagnosis   • Acute kidney injury (EHSAN) with acute tubular necrosis (ATN) (CMS/HCC)   • Weight loss   • Intractable nausea and vomiting   • Low back pain   1.  Acute kidney injury related to dehydration likely an poor p.o. intake.  Will aggressively hydrate with IV fluids recheck labs.  2.  Intractable nausea and vomiting unlikely given the pattern she is having that this is a superior mesenteric artery syndrome however will ask for vascular surgery's opinion.  Also will consult GI as she is never had an endoscopy and I believe she definitely warrants 1.  Also will hold her medications that can be irritant to the stomach considering she is on Naprosyn and iron among other things.  We will start Protonix IV Zofran as needed for nausea low-dose morphine for pain given her back is giving her a lot of difficulty.  Probably needs some Relistor to help with her constipation.  3.  Low back pain as needed pain medication for now outpatient work-up.  4.  Unintentional weight loss due to #2 further plans as per work-up above.  She may also need a surgical consult but I do not believe she is in any kind of shape to have the procedure like that.      Patient seen after midnight         Code Status: DNR/DNI     I discussed the patient's findings and my recommendations with the patient and her granddaughter.  Patient's son is her surrogate healthcare decision maker.    Estimated length of stay greater than 2 nights.    Patient seen and examined by me on March 2, 2021 at 6:00 AM.    Electronically signed by Richie Peng MD, 03/02/21, 06:33 CST.

## 2021-03-02 NOTE — PROGRESS NOTES
Discharge Planning Assessment  [facility]     Patient Name: Rosemary Petersen  MRN: [id number]  Today's Date: 3/2/2021    Admit Date: 3/1/2021    Discharge Needs Assessment     Row Name 03/02/21 1319       Living Environment    Lives With  alone    Current Living Arrangements  home/apartment/condo    Primary Care Provided by  self    Provides Primary Care For  no one    Family Caregiver if Needed  grandchild(abe), adult;child(abe), adult    Quality of Family Relationships  helpful;involved;supportive    Able to Return to Prior Arrangements  yes       Resource/Environmental Concerns    Resource/Environmental Concerns  none       Transition Planning    Patient/Family Anticipates Transition to  home    Patient/Family Anticipated Services at Transition  none    Transportation Anticipated  family or friend will provide       Discharge Needs Assessment    Readmission Within the Last 30 Days  no previous admission in last 30 days    Equipment Currently Used at Home  walker, rolling;cane, straight    Concerns to be Addressed  adjustment to diagnosis/illness    Discharge Coordination/Progress  Pt lives at home alone. She says she will return home at d/c. Discussed home health and she is not sure if she will need. She says her daughter is a manager at [employer] so that may be an option for her. Pt says she has rx coverage and is able to get her meds. Will follow.        Discharge Plan    No documentation.       Continued Care and Services - Admitted Since 3/1/2021    Coordination has not been started for this encounter.         Demographic Summary    No documentation.       Functional Status    No documentation.       Psychosocial    No documentation.       Abuse/Neglect    No documentation.       Legal    No documentation.       Substance Abuse    No documentation.       Patient Forms    No documentation.           SAURAV Mobley

## 2021-03-02 NOTE — ED PROVIDER NOTES
Subjective   Ms. Petersen is an 80-year-old female presents the hospital with nausea vomiting and inability to take in much oral intake.  She states that she gets choked if she lays back when she is trying to eat or drink and she states that even when she is sitting in the proper sedation her amount of oral intake over the last year and a half is gone down significantly.  To where she is basically just eating 1 or 2 meals a day prepackaged foods.  She denies any shortness of breath fevers or chills.          Review of Systems   Constitutional: Negative for chills, fatigue and fever.   HENT: Negative for congestion and facial swelling.    Eyes: Negative for photophobia, discharge and visual disturbance.   Respiratory: Negative for cough, shortness of breath and wheezing.    Cardiovascular: Negative for chest pain, palpitations and leg swelling.   Gastrointestinal: Positive for diarrhea, nausea and vomiting. Negative for abdominal pain.   Endocrine: Negative for cold intolerance and heat intolerance.   Genitourinary: Negative for difficulty urinating and urgency.   Musculoskeletal: Negative for arthralgias, joint swelling and myalgias.   Skin: Negative for color change and pallor.   Neurological: Positive for weakness. Negative for dizziness and light-headedness.   Hematological: Negative for adenopathy. Does not bruise/bleed easily.   Psychiatric/Behavioral: Negative for agitation, behavioral problems and confusion.       Past Medical History:   Diagnosis Date   • Asthma    • Back pain    • Back pain    • Diabetes mellitus (CMS/HCC)    • Hypertension        No Known Allergies    Past Surgical History:   Procedure Laterality Date   • APPENDECTOMY     • CHOLECYSTECTOMY     • HYSTERECTOMY         History reviewed. No pertinent family history.    Social History     Socioeconomic History   • Marital status: Single     Spouse name: Not on file   • Number of children: Not on file   • Years of education: Not on file   •  Highest education level: Not on file   Tobacco Use   • Smoking status: Former Smoker     Years: 0.00   • Tobacco comment: QUIT 25 YEARS AGO   Substance and Sexual Activity   • Alcohol use: Never     Frequency: Never   • Drug use: Never   • Sexual activity: Defer           Objective   Physical Exam  Vitals signs and nursing note reviewed.   Constitutional:       Appearance: Normal appearance. She is well-developed.   HENT:      Head: Normocephalic and atraumatic.      Nose: Nose normal.   Eyes:      Conjunctiva/sclera: Conjunctivae normal.      Pupils: Pupils are equal, round, and reactive to light.   Neck:      Musculoskeletal: Normal range of motion and neck supple.   Cardiovascular:      Rate and Rhythm: Normal rate and regular rhythm.      Heart sounds: Normal heart sounds.   Pulmonary:      Effort: Pulmonary effort is normal.      Breath sounds: Normal breath sounds.   Abdominal:      General: Bowel sounds are normal. There is no distension.      Palpations: Abdomen is soft.   Musculoskeletal: Normal range of motion.   Skin:     General: Skin is warm and dry.   Neurological:      General: No focal deficit present.      Mental Status: She is alert and oriented to person, place, and time.      Cranial Nerves: No cranial nerve deficit.   Psychiatric:         Mood and Affect: Mood normal.         Behavior: Behavior normal.         Thought Content: Thought content normal.         Procedures           ED Course  ED Course as of Mar 02 0443   Tue Mar 02, 2021   0442 I discussed the results with the patient and her granddaughter who were kind of at their wits and they state that she just cannot consistently absorb stuff and that she is just getting weaker and weaker with increased amounts of weight loss because this they want further evaluation.  They have already discussed this with Dr. Bhakta her primary care provider who recommended coming to the ER for direct admission.    [RW]      ED Course User Index  [RW]  Nick Au MD                                           [facility]    Final diagnoses:   Acute kidney injury (EHSAN) with acute tubular necrosis (ATN) (CMS/[facility])            Nick Au MD  03/02/21 4864

## 2021-03-03 NOTE — PLAN OF CARE
Goal Outcome Evaluation:  Plan of Care Reviewed With: patient  Progress: no change  Outcome Summary: Pt c/o right hip pain; awakened at MN and was confused to situation, pulled out IV and telemetry off and pulse ox off;  possible endo 3-3-21;  NPO since MN

## 2021-03-03 NOTE — ANESTHESIA PREPROCEDURE EVALUATION
Anesthesia Evaluation     Patient summary reviewed and Nursing notes reviewed   NPO Solid Status: > 8 hours  NPO Liquid Status: > 8 hours           Airway   Mallampati: I  TM distance: >3 FB  Neck ROM: full  Dental    (+) edentulous    Pulmonary - normal exam   (+) COPD moderate, asthma,  Cardiovascular - normal exam  Exercise tolerance: poor (<4 METS)    ECG reviewed    (+) hypertension,       Neuro/Psych  (+) weakness,     GI/Hepatic/Renal/Endo    (+)  GERD well controlled,  renal disease, diabetes mellitus,     Musculoskeletal     (+) back pain,   Abdominal    Substance History      OB/GYN          Other                        Anesthesia Plan    ASA 3 - emergent     MAC   (Code status discussed with patient and patient agrees to rescind DNR during procedure. )  intravenous induction     Anesthetic plan, all risks, benefits, and alternatives have been provided, discussed and informed consent has been obtained with: patient.

## 2021-03-03 NOTE — PLAN OF CARE
Goal Outcome Evaluation:  Plan of Care Reviewed With: patient, family, other (see comments)(RN)  Progress: no change  Outcome Summary: Pt with poor appetite and wt loss over past month. Clear diet. Boost Breeze added BID. NFPE: moderate fat loss and moderate muscle loss, generalized weakness present. Cont to follow for plan of care.

## 2021-03-03 NOTE — ANESTHESIA POSTPROCEDURE EVALUATION
"Patient: Rosemary Petersen    Procedure Summary     Date: 03/03/21 Room / Location: [facility] ENDOSCOPY 4 / [facility] ENDOSCOPY    Anesthesia Start: 1255 Anesthesia Stop: 1313    Procedure: ESOPHAGOGASTRODUODENOSCOPY WITH ANESTHESIA (N/A ) Diagnosis:       Intractable nausea and vomiting      (Intractable nausea and vomiting [R11.2])    Surgeon: Gallito Dobbs MD Provider: Aislinn Kevin CRNA    Anesthesia Type: MAC ASA Status: 3 - Emergent          Anesthesia Type: MAC    Vitals  Vitals Value Taken Time   /83 03/03/21 1345   Temp     Pulse 73 03/03/21 1347   Resp 13 03/03/21 1345   SpO2 98 % 03/03/21 1347   Vitals shown include unvalidated device data.        Post Anesthesia Care and Evaluation    Patient location during evaluation: bedside  Patient participation: complete - patient participated  Level of consciousness: awake and awake and alert  Pain score: 0  Pain management: adequate  Airway patency: patent  Anesthetic complications: No anesthetic complications  PONV Status: none  Cardiovascular status: acceptable  Respiratory status: acceptable  Hydration status: acceptable    Comments: Patient discharged according to acceptable Mae score per RN assessment. See nursing records for further information.     Blood pressure 180/83, pulse 72, temperature 98.6 °F (37 °C), temperature source Oral, resp. rate 13, height 157.5 cm (62\"), weight 58 kg (127 lb 12.8 oz), SpO2 98 %.        "

## 2021-03-03 NOTE — TELEPHONE ENCOUNTER
Spoke with Ms Petersen's granddaughter Noemi reminding her of Ms Padgett appointment for Thursday, March 4th, 2021 at 1115 am with Dr Almaguer. Noemi stated they seen Dr Almaguer in the hospital yesterday and he stated their was no needed for them to come to tomorrow's appointment that they would follow up after they were discharged.

## 2021-03-03 NOTE — PLAN OF CARE
Goal Outcome Evaluation:  Plan of Care Reviewed With: patient, family  Progress: no change   Endo today, multiple polyps noted, biopsies taken, gastritis, large hiatal hernia. PPI, no NSAIDS, clear liquids. Continue to medicate for arthritic hip pain, home bp meds resumed. Had one episode of myoclonic movements prior to going to endo, upon awakening and was noted while sitting on toilet. MD aware, continue to monitor. No further episodes.

## 2021-03-04 PROBLEM — E44.0 MODERATE MALNUTRITION (HCC): Status: ACTIVE | Noted: 2021-01-01

## 2021-03-04 PROBLEM — K59.00 CONSTIPATION: Status: ACTIVE | Noted: 2021-01-01

## 2021-03-04 PROBLEM — I10 HYPERTENSION: Status: ACTIVE | Noted: 2021-01-01

## 2021-03-04 PROBLEM — K43.9 VENTRAL HERNIA: Status: ACTIVE | Noted: 2021-01-01

## 2021-03-04 PROBLEM — M79.604 RIGHT LEG PAIN: Status: ACTIVE | Noted: 2021-01-01

## 2021-03-04 PROBLEM — K44.9 HIATAL HERNIA: Status: ACTIVE | Noted: 2021-01-01

## 2021-03-04 PROBLEM — K29.70 GASTRITIS: Status: ACTIVE | Noted: 2021-01-01

## 2021-03-04 NOTE — PROGRESS NOTES
[facility] Medicine Services  INPATIENT PROGRESS NOTE    Patient Name: Rosemary Petersen  Date of Admission: 3/1/2021  Today's Date: 03/03/21  Length of Stay: 0  Primary Care Physician: Daquan Bautista MD    Subjective   Chief Complaint: nausea and vomiting  HPI   Doing well, feels a bit better  Endo showed some polyps and gastritis  Still has right hip pain        Review of Systems   Constitutional: Positive for fatigue. Negative for fever.   HENT: Negative for congestion and ear pain.    Eyes: Negative for pain and visual disturbance.   Respiratory: Negative for cough, shortness of breath and wheezing.    Cardiovascular: Negative for chest pain and palpitations.   Gastrointestinal: Positive for nausea. Negative for diarrhea and vomiting.   Endocrine: Negative for heat intolerance.   Genitourinary: Negative for dysuria and frequency.   Musculoskeletal: Negative for arthralgias and back pain.   Skin: Negative for rash and wound.   Neurological: Negative for dizziness and light-headedness.   Psychiatric/Behavioral: Negative for confusion. The patient is not nervous/anxious.    All other systems reviewed and are negative.       All pertinent negatives and positives are as above. All other systems have been reviewed and are negative unless otherwise stated.     Objective    Temp:  [97.9 °F (36.6 °C)-98.6 °F (37 °C)] 98.4 °F (36.9 °C)  Heart Rate:  [67-87] 68  Resp:  [12-18] 13  BP: (115-182)/(53-83) 178/79  Physical Exam  Constitutional:       Appearance: Normal appearance. She is well-developed.   HENT:      Head: Normocephalic and atraumatic.      Right Ear: Tympanic membrane, ear canal and external ear normal.      Left Ear: Tympanic membrane, ear canal and external ear normal.      Nose: Nose normal.      Mouth/Throat:      Mouth: Mucous membranes are moist.      Pharynx: Oropharynx is clear.   Eyes:      Extraocular Movements: Extraocular movements intact.       Conjunctiva/sclera: Conjunctivae normal.      Pupils: Pupils are equal, round, and reactive to light.   Neck:      Musculoskeletal: Normal range of motion and neck supple.   Cardiovascular:      Rate and Rhythm: Normal rate and regular rhythm.      Heart sounds: Normal heart sounds.   Pulmonary:      Effort: Pulmonary effort is normal.      Breath sounds: Normal breath sounds.   Abdominal:      General: Bowel sounds are normal. There is no distension.      Palpations: Abdomen is soft.      Tenderness: There is no abdominal tenderness.   Musculoskeletal: Normal range of motion.   Skin:     General: Skin is warm and dry.   Neurological:      Mental Status: She is alert and oriented to person, place, and time.   Psychiatric:         Mood and Affect: Mood normal.         Speech: Speech normal.         Behavior: Behavior normal.         Thought Content: Thought content normal.         Judgment: Judgment normal.             Results Review:  I have reviewed the labs, radiology results, and diagnostic studies.    Laboratory Data:   Results from last 7 days   Lab Units 03/03/21  0535 03/01/21 2058 02/25/21 2026   WBC 10*3/mm3 3.99 6.10 5.69   HEMOGLOBIN g/dL 10.9* 11.7* 11.2*   HEMATOCRIT % 33.9* 36.6 35.5   PLATELETS 10*3/mm3 262 275 255        Results from last 7 days   Lab Units 03/03/21  0535 03/01/21 2058 02/25/21 2026   SODIUM mmol/L 138 137 137   POTASSIUM mmol/L 4.9 5.0 4.7   CHLORIDE mmol/L 107 102 105   CO2 mmol/L 23.0 23.0 23.0   BUN mg/dL 28* 55* 43*   CREATININE mg/dL 1.17* 2.01* 1.50*   CALCIUM mg/dL 9.3 10.4 10.3   BILIRUBIN mg/dL <0.2 0.2  --    ALK PHOS U/L 120* 146*  --    ALT (SGPT) U/L 31 47*  --    AST (SGOT) U/L 30 47*  --    GLUCOSE mg/dL 95 119* 114*       Culture Data:   No results found for: BLOODCX, URINECX, WOUNDCX, MRSACX, RESPCX, STOOLCX    Radiology Data:   Imaging Results (Last 24 Hours)     ** No results found for the last 24 hours. **          I have reviewed the patient's current  medications.     Assessment/Plan     Active Hospital Problems    Diagnosis   • Acute kidney injury (EHSAN) with acute tubular necrosis (ATN) (CMS/HCC)   • Weight loss   • Intractable nausea and vomiting   • Low back pain       Labs reviewed:anema, renal function better    Imaging reviewed:XR hip    Imaging independently interpreted by me: XR Hip no fracture    EGD reviewed    1.  EHSAN  -IVF    2.  Persistent nausea/vomiting with weight loss  -possibly due to gastritis, could be H. Pylori related.  Test pending  -Vascular doesn't feel this is a vascular/ischemia issue  -Zofran  -Protonix PO    3.  Anemia  -monitor H&H  -Check Iron panel    4.  T2DM  -SSI          Discharge Planning: I expect the patient to be discharged to home in 2-3 days    Electronically signed by Elier Heard MD, 03/03/21, 18:42 CST.

## 2021-03-04 NOTE — PROGRESS NOTES
[facility] Gastroenterology  Inpatient Progress Note  Rosemary Petersen  1940    3/4/2021  Reason for Follow Up:  Nausea/vomiting constipation    Subjective     Subjective: Pt AAOx3. Her grand daughter at the bedside is very concerned and states she has had progressive fatigue since being here and no BM is concerning. No nausea or vomiting since Sunday per the family at bedside and the patient. No abdominal pain. VSS. I have reviewed the Endoscopy with them and have suggested she continue on the Protonix. No BM in 3-4 days I added Miralax on admission with no results. I have told the nurse to give her a tap water enema today and suggested fiber.       Current Facility-Administered Medications:   •  acetaminophen (TYLENOL) tablet 650 mg, 650 mg, Oral, Q4H PRN **OR** acetaminophen (TYLENOL) 160 MG/5ML solution 650 mg, 650 mg, Oral, Q4H PRN **OR** acetaminophen (TYLENOL) suppository 650 mg, 650 mg, Rectal, Q4H PRN, Gallito Dobbs MD  •  albuterol (PROVENTIL) nebulizer solution 0.042% 1.25 mg/3mL, 1.25 mg, Nebulization, Q6H PRN, Gallito Dobbs MD  •  allopurinol (ZYLOPRIM) tablet 300 mg, 300 mg, Oral, Daily, Gallito Dobbs MD, 300 mg at 03/04/21 0846  •  ALPRAZolam (XANAX) tablet 0.25 mg, 0.25 mg, Oral, Q8H PRN, Gallito Dobbs MD  •  butalbital-acetaminophen-caffeine (FIORICET, ESGIC) -40 MG per tablet 1 tablet, 1 tablet, Oral, Q6H PRN, Gallito Dobbs MD  •  levothyroxine (SYNTHROID, LEVOTHROID) tablet 50 mcg, 50 mcg, Oral, Q AM, Gallito Dobbs MD, 50 mcg at 03/03/21 0608  •  lisinopril (PRINIVIL,ZESTRIL) tablet 20 mg, 20 mg, Oral, Q12H, Elier Heard MD, 20 mg at 03/04/21 0846  •  montelukast (SINGULAIR) tablet 10 mg, 10 mg, Oral, Nightly, Gallito Dobbs MD, 10 mg at 03/03/21 2039  •  Morphine sulfate (PF) injection 2 mg, 2 mg, Intravenous, Q4H PRN, 2 mg at 03/04/21 0744 **AND** naloxone (NARCAN) injection 0.4 mg, 0.4 mg, Intravenous, Q5 Min PRN, Gallito Dobbs,  MD  •  ondansetron (ZOFRAN) tablet 4 mg, 4 mg, Oral, Q6H PRN **OR** ondansetron (ZOFRAN) injection 4 mg, 4 mg, Intravenous, Q6H PRN, Gallito Dobbs MD, 4 mg at 03/02/21 1519  •  pantoprazole (PROTONIX) EC tablet 40 mg, 40 mg, Oral, Q AM, Elier Heard MD  •  polyethylene glycol (MIRALAX) packet 17 g, 17 g, Oral, Daily, Gallito Dobbs MD, 17 g at 03/04/21 0846  •  sennosides-docusate (PERICOLACE) 8.6-50 MG per tablet 1 tablet, 1 tablet, Oral, BID, Elier Heard MD, 1 tablet at 03/04/21 0846  •  sodium chloride 0.9 % flush 10 mL, 10 mL, Intravenous, PRN, Gallito Dobbs MD  •  sodium chloride 0.9 % flush 10 mL, 10 mL, Intravenous, Q12H, Gallito Dobbs MD, 10 mL at 03/03/21 2039  •  sodium chloride 0.9 % flush 10 mL, 10 mL, Intravenous, PRN, Gallito Dobbs MD  •  sodium chloride 0.9 % flush 10 mL, 10 mL, Intravenous, Q12H, Aislinn Kevin CRNA  •  sodium chloride 0.9 % flush 10 mL, 10 mL, Intravenous, PRN, Aislinn Kevin CRNA  •  sodium chloride 0.9 % infusion, 50 mL/hr, Intravenous, Continuous, Elier Heard MD, Last Rate: 50 mL/hr at 03/04/21 0729, 50 mL/hr at 03/04/21 0729    Review of Systems:    Review of Systems   Constitutional: Positive for fatigue and unexpected weight change. Negative for activity change, appetite change, chills, diaphoresis and fever.   HENT: Negative for ear pain, hearing loss, mouth sores, sore throat, trouble swallowing and voice change.    Eyes: Negative.    Respiratory: Negative for cough, choking, shortness of breath and wheezing.    Cardiovascular: Negative for chest pain and palpitations.   Gastrointestinal: Negative for abdominal pain, blood in stool, constipation, diarrhea, nausea and vomiting.   Endocrine: Negative for cold intolerance and heat intolerance.   Genitourinary: Negative for decreased urine volume, dysuria, frequency, hematuria and urgency.   Musculoskeletal: Negative for back pain, gait problem and myalgias.    Skin: Negative for color change, pallor and rash.   Allergic/Immunologic: Negative for food allergies and immunocompromised state.   Neurological: Positive for weakness. Negative for dizziness, tremors, seizures, syncope, light-headedness, numbness and headaches.   Hematological: Negative for adenopathy. Does not bruise/bleed easily.   Psychiatric/Behavioral: Negative for agitation and confusion. The patient is not nervous/anxious.    All other systems reviewed and are negative.       Objective     Vital Signs  Temp:  [98.2 °F (36.8 °C)-98.8 °F (37.1 °C)] 98.2 °F (36.8 °C)  Heart Rate:  [68-82] 73  Resp:  [12-18] 18  BP: (115-194)/(53-87) 194/79  Body mass index is 23.37 kg/m².    Intake/Output Summary (Last 24 hours) at 3/4/2021 0912  Last data filed at 3/4/2021 0230  Gross per 24 hour   Intake 1438 ml   Output 1850 ml   Net -412 ml     No intake/output data recorded.       Physical Exam:   General: patient awake, alert and cooperative, no acute distress   Eyes: Normal lids and lashes, no scleral icterus   Neck: supple, no JVD   Skin: warm and dry   Cardiovascular: regular rhythm and rate, no murmurs auscultated   Pulm: clear to auscultation bilaterally, regular and unlabored   Abdomen: soft, nontender, nondistended; normal bowel sounds   Psychiatric: Normal mood and behavior; converses appropriately     Results Review:    I have reviewed all of the patients current test results    Results from last 7 days   Lab Units 03/03/21 0535 03/01/21 2058 02/25/21 2026   WBC 10*3/mm3 3.99 6.10 5.69   HEMOGLOBIN g/dL 10.9* 11.7* 11.2*   HEMATOCRIT % 33.9* 36.6 35.5   PLATELETS 10*3/mm3 262 275 255       Results from last 7 days   Lab Units 03/03/21 0535 03/01/21 2058 02/25/21 2026   SODIUM mmol/L 138 137 137   POTASSIUM mmol/L 4.9 5.0 4.7   CHLORIDE mmol/L 107 102 105   CO2 mmol/L 23.0 23.0 23.0   BUN mg/dL 28* 55* 43*   CREATININE mg/dL 1.17* 2.01* 1.50*   CALCIUM mg/dL 9.3 10.4 10.3   BILIRUBIN mg/dL <0.2 0.2  --     ALK PHOS U/L 120* 146*  --    ALT (SGPT) U/L 31 47*  --    AST (SGOT) U/L 30 47*  --    GLUCOSE mg/dL 95 119* 114*             Lab Results   Lab Value Date/Time    LIPASE 34 03/02/2021 0130    LIPASE 425 (H) 04/06/2019 1814       Radiology:    Imaging Results (Last 24 Hours)     ** No results found for the last 24 hours. **            Assessment/Plan     Patient Active Problem List   Diagnosis Code   • Acute kidney injury (EHSAN) with acute tubular necrosis (ATN) (CMS/HCC) N17.0   • Weight loss R63.4   • Intractable nausea and vomiting R11.2   • Low back pain M54.5       1. Nausea/vomiting   2. Wt loss  3. S/p Endoscopy - Z-line irregular. Biopsied.  - Large hiatal hernia.  - Gastritis. Biopsied.  - Multiple gastric polyps. Resected and retrieved.  - Normal first portion of the duodenum, second portion of the duodenum and third portion of the  duodenum. Biopsied.  - No clear source for the patient's overall symptom complex is identified.    Continue PPI therapy  Give tap water enema  Not a candidate for colonoscopy at this time. Discussed with patient and family and they do want to defer this at this time due to her weakness and hip pain. May consider as outpatient if felt appropriate or desired.   Urea for Hpylori pending  Follow up with GI in approx 2-3 weeks post discharge  Fiber recommended as well.   Increase water intake and try to reduce narcotics.       EMR Dragon/transcription disclaimer: Much of this encounter note is electronic transcription/translation of spoken language to printed text. The electronic translation of spoken language may be erroneous, or at times, nonsensical words or phrases may be inadvertently transcribed. Although I have reviewed the note for such errors, some may still exist.    Monika Soto, APRN  03/04/21  09:12 CST

## 2021-03-04 NOTE — PROGRESS NOTES
[facility] Medicine Services  INPATIENT PROGRESS NOTE    Patient Name: Rosemary Petersen  Date of Admission: 3/1/2021  Today's Date: 03/04/21  Length of Stay: 0  Primary Care Physician: Daquan Bautista MD    Subjective   Chief Complaint: Weight loss; right leg pain  HPI   Patient states that she is doing okay today.  She feels like that her nausea symptoms have improved after being started on the PPI.  Her main complaint is pain in her right leg.  Denies any current back pain, but does admit that the pain moves from her right hip region down towards her right knee.  She has been having trouble with nausea, poor appetite, and change in taste on an outpatient basis.  This has resulted in weight loss.  She reports that she is prone to constipation.  She denies postprandial pain.    Review of Systems     All pertinent negatives and positives are as above. All other systems have been reviewed and are negative unless otherwise stated.     Objective    Temp:  [98.1 °F (36.7 °C)-98.8 °F (37.1 °C)] 98.1 °F (36.7 °C)  Heart Rate:  [68-82] 74  Resp:  [12-18] 18  BP: (115-194)/(53-87) 180/82  Physical Exam  Vitals signs reviewed.   Constitutional:       Appearance: She is not toxic-appearing.      Comments: Sitting up on side of bed   HENT:      Head: Normocephalic.      Mouth/Throat:      Pharynx: No oropharyngeal exudate.   Eyes:      General: No scleral icterus.  Cardiovascular:      Rate and Rhythm: Normal rate.   Pulmonary:      Effort: Pulmonary effort is normal. No respiratory distress.   Abdominal:      Palpations: Abdomen is soft.      Hernia: A hernia is present.   Musculoskeletal:         General: No deformity.   Skin:     General: Skin is warm.      Capillary Refill: Capillary refill takes less than 2 seconds.   Neurological:      Mental Status: She is alert.      Motor: Weakness present.         Results Review:  I have reviewed the labs, radiology results, and diagnostic  "studies.    Laboratory Data:   Results from last 7 days   Lab Units 03/03/21  0535 03/01/21 2058 02/25/21 2026   WBC 10*3/mm3 3.99 6.10 5.69   HEMOGLOBIN g/dL 10.9* 11.7* 11.2*   HEMATOCRIT % 33.9* 36.6 35.5   PLATELETS 10*3/mm3 262 275 255        Results from last 7 days   Lab Units 03/03/21  0535 03/01/21 2058 02/25/21 2026   SODIUM mmol/L 138 137 137   POTASSIUM mmol/L 4.9 5.0 4.7   CHLORIDE mmol/L 107 102 105   CO2 mmol/L 23.0 23.0 23.0   BUN mg/dL 28* 55* 43*   CREATININE mg/dL 1.17* 2.01* 1.50*   CALCIUM mg/dL 9.3 10.4 10.3   BILIRUBIN mg/dL <0.2 0.2  --    ALK PHOS U/L 120* 146*  --    ALT (SGPT) U/L 31 47*  --    AST (SGOT) U/L 30 47*  --    GLUCOSE mg/dL 95 119* 114*       Culture Data:   No results found for: BLOODCX, URINECX, WOUNDCX, MRSACX, RESPCX, STOOLCX    Radiology Data:   Imaging Results (Last 24 Hours)     ** No results found for the last 24 hours. **          I have reviewed the patient's current medications.     Assessment/Plan     Active Hospital Problems    Diagnosis   • Moderate malnutrition (CMS/HCC)   • Constipation   • Gastritis   • Hiatal hernia   • Ventral hernia   • Hypertension   • Right leg pain   • Acute kidney injury (EHSAN) with acute tubular necrosis (ATN) (CMS/HCC)   • Weight loss   • Intractable nausea and vomiting   • Low back pain     Plan:  1.  Discussed with gastroenterology  2.  Continue PPI; trial of Carafate  3.  Nutrition supplementation  4.  Patient/family report approximately 40 pounds of weight loss \"since last fall\" and report that her weight loss seems to have accelerated over the past month.  5.  Resume aspirin and statin  6.  Restart outpatient antihypertensive indapamide; add low-dose Norvasc; monitor blood pressure trend  7.  Bowel regimen including a tap water enema for treatment of constipation  8.  Trial of a low-dose of Mobic.  Patient states that one of her main symptoms is pain in the right hip region.  She does have arthritic changes noted on x-ray " imaging.  I would like to DC morphine as I do not think this is the ideal approach for this patient.  Can try Tylenol (discussed with patient and family) in addition to a low-dose of Mobic, understanding that she does have some underlying gastritis, hiatal hernia, etc.  9.  Physical therapy assessment  10.  Follow-up H. pylori  11.  Vascular does not feel imaging consistent with an ischemic etiology (SMA)  12.  CBC and BMP in the morning tomorrow  13.  Dispo planning      Electronically signed by Juanjose Boland MD, 03/04/21, 13:10 CST.

## 2021-03-04 NOTE — PLAN OF CARE
Goal Outcome Evaluation:     Progress: no change  Outcome Summary: Patient c/o pain x 2 tonight. Poor appetite. Family concerned about twitching/tremors. No appetite. Generalized weakness. Up x 2. Granddaughter stayed at bedside. IVF and IV abx. VSS. Safety maintained. No distress noted at this time.

## 2021-03-05 NOTE — PLAN OF CARE
Goal Outcome Evaluation:      Follow up in attempts to complete PT eval.  Discussed w/ RN and pt's granddaughter.  Pt resting.  RN had advised to hold if pt resting, because she did not sleep last night.  Granddaughter agreed.  Pt w/ orders for imaging of low back and hip also.  Will hold and await reports from imaging w/ follow up this weekend.

## 2021-03-05 NOTE — PLAN OF CARE
Goal Outcome Evaluation:  Plan of Care Reviewed With: patient  Progress: no change  Outcome Summary: Pt c/o right hip pain unrelived by tylenol, norco, or voltaren gel. Multiple soft BMs today. Up with assist to bathroom. Cont pulse ox, aqua k pad to RLE/hip. IVF, VSS, cont to monitor.

## 2021-03-05 NOTE — PLAN OF CARE
Goal Outcome Evaluation:  Plan of Care Reviewed With: patient, family  Progress: no change  Outcome Summary: Pty is now ordered a regular diet. She cont to have a poor appetite r/t N/V and multiple BM's. She has only consumed 15% of 1 meal. She is receiving jello TID with meals and Boost Breeze BID. Will not advance to Boost plus d/t cont'd N/V and BM's, will cont with clear supplementation for tolerance. No new weight for comparison. As GI distress improves, will advance to Boost + for more kcal and protein. Cont to follow.

## 2021-03-05 NOTE — PLAN OF CARE
Goal Outcome Evaluation:  Plan of Care Reviewed With: patient  Progress: improving  Outcome Summary: PT. UP WITH MINIMAL ASSISTANCE. BP MED ELEVATED @ TIMES THIS SHIFT. DR. HICKS AWARE. SEE MED LIST. PT. ALSO STARTED ON METAMUCIL TODAY. TAP WATER ENEMA GIVEN PER ORDER OF PRASANTH PATEL. ONLY A VERY SMALL AMOUNT OF STOOL NOTED. NO DISTRESS NOTED.

## 2021-03-05 NOTE — PLAN OF CARE
Goal Outcome Evaluation:  Plan of Care Reviewed With: patient, daughter  Progress: no change  Outcome Summary: Patient c/o pain to right hip unrelieved by tylenol. BM x 1 this shift; moderate size. Up with assistance to BSC. IVF. VSS. Safety maintained. No distress noticed at this time. Daughter at bedside.

## 2021-03-05 NOTE — PROGRESS NOTES
"    [facility] Medicine Services  INPATIENT PROGRESS NOTE    Patient Name: Rosemary Petersen  Date of Admission: 3/1/2021  Today's Date: 03/05/21  Length of Stay: 0  Primary Care Physician: Daquan Bautista MD    Subjective   Chief Complaint: Right leg pain; nausea  HPI   Patient was getting ready to vomit when I entered the room.  We were able to get her an emesis bag, and she did have an episode of nonbloody, nonbilious emesis.  She states \"this is what has been happening\" at home.  Will have abrupt onset of nausea symptoms and subsequently vomit.  She reports that she has had very little to eat or drink today.  She also states that she has had significant pain in her right leg region, and locates this all the way from the right hip down to the right knee (proximal to the right knee).  She denies radiculopathy symptoms.  Denies any motor weakness.  Very frustrated about her pain, which resulted in little rest overnight.  Patient describes the pain as a throbbing burning sensation.    Review of Systems   Gastrointestinal: Positive for nausea and vomiting.        All pertinent negatives and positives are as above. All other systems have been reviewed and are negative unless otherwise stated.     Objective    Temp:  [98.2 °F (36.8 °C)-98.6 °F (37 °C)] 98.2 °F (36.8 °C)  Heart Rate:  [74-81] 77  Resp:  [16] 16  BP: (148-186)/() 162/102  Physical Exam  Vitals signs reviewed.   Constitutional:       Appearance: She is ill-appearing. She is not toxic-appearing.      Comments: Nauseated and getting ready to vomit when I arrived; did vomit in emesis bag during my assessment   HENT:      Head: Normocephalic.      Mouth/Throat:      Pharynx: No oropharyngeal exudate.   Eyes:      General: No scleral icterus.  Cardiovascular:      Rate and Rhythm: Normal rate.   Pulmonary:      Effort: Pulmonary effort is normal. No respiratory distress.   Abdominal:      Palpations: Abdomen is soft.      " Hernia: A hernia is present.   Musculoskeletal:         General: No deformity.      Comments: No focal right hip pain or deformity.  No deformity from right hip to right knee appreciated.  No focal lower back (lumbar) or paraspinal pain appreciated.   Skin:     General: Skin is warm.      Capillary Refill: Capillary refill takes less than 2 seconds.   Neurological:      Mental Status: She is alert.      Motor: Weakness present.         Results Review:  I have reviewed the labs, radiology results, and diagnostic studies.    Laboratory Data:   Results from last 7 days   Lab Units 03/05/21 0528 03/03/21 0535 03/01/21 2058   WBC 10*3/mm3 5.41 3.99 6.10   HEMOGLOBIN g/dL 11.8* 10.9* 11.7*   HEMATOCRIT % 37.2 33.9* 36.6   PLATELETS 10*3/mm3 265 262 275        Results from last 7 days   Lab Units 03/05/21 0528 03/03/21 0535 03/01/21 2058   SODIUM mmol/L 139 138 137   POTASSIUM mmol/L 3.9 4.9 5.0   CHLORIDE mmol/L 103 107 102   CO2 mmol/L 27.0 23.0 23.0   BUN mg/dL 12 28* 55*   CREATININE mg/dL 0.82 1.17* 2.01*   CALCIUM mg/dL 10.3 9.3 10.4   BILIRUBIN mg/dL  --  <0.2 0.2   ALK PHOS U/L  --  120* 146*   ALT (SGPT) U/L  --  31 47*   AST (SGOT) U/L  --  30 47*   GLUCOSE mg/dL 121* 95 119*       Culture Data:   No results found for: BLOODCX, URINECX, WOUNDCX, MRSACX, RESPCX, STOOLCX    Radiology Data:   Imaging Results (Last 24 Hours)     ** No results found for the last 24 hours. **          I have reviewed the patient's current medications.     Assessment/Plan     Active Hospital Problems    Diagnosis   • Moderate malnutrition (CMS/HCC)   • Constipation   • Gastritis   • Hiatal hernia   • Ventral hernia   • Hypertension   • Right leg pain   • Acute kidney injury (EHSAN) with acute tubular necrosis (ATN) (CMS/HCC)   • Weight loss   • Intractable nausea and vomiting   • Low back pain     Plan:  1.  CT L-spine and RLE to further evaluate cause of severe, intractable pain  2.  IV Zofran now  3.  Continue PPI and trial of  "Carafate  4.  Aqua K pad and trial of Voltaren gel.  5.  Trial of low dose Mobic  6.  Nutrition supplementation  7.  Patient/family report approximately 40 pounds of weight loss \"since last fall\" and report that her weight loss seems to have accelerated over the past month.  8.  Aspirin and statin  9.  Indapamide; low-dose Norvasc added yesterday; Lisinopril; monitor blood pressure trend  10.  Bowel regimen   11.  Physical therapy assessment  12.  Follow-up H. Pylori; pathology with chronic gastritis and reflux esophagitis  13.  Vascular does not feel imaging consistent with an ischemic etiology (SMA)  14.  Labs reviewed; stable  15.  Dispo planning.  Would like to see how patient does with PT.  Patient lives at home.      Electronically signed by Juanjose Boland MD, 03/05/21, 11:18 CST.  "

## 2021-03-05 NOTE — PROGRESS NOTES
Continued Stay Note   [facility]     Patient Name: Rosemary Petersen  MRN: [id number]  Today's Date: 3/5/2021    Admit Date: 3/1/2021    Discharge Plan     Row Name 03/05/21 0846       Plan    Plan  Home vs Home Health    Patient/Family in Agreement with Plan  yes    Plan Comments  Pt lives at home alone. Note that P.T. ordered, will see what they recommed upon eval completion.  Pt may need HH at d/c. Will follow.        Discharge Codes    No documentation.             SAURAV Macias

## 2021-03-06 PROBLEM — N17.9 ACUTE KIDNEY INJURY (HCC): Status: ACTIVE | Noted: 2021-01-01

## 2021-03-06 NOTE — CONSULTS
NEUROSURGERY INITIAL HOSPITAL ENCOUNTER    Assessment/Plan:   Rosemary Petersen is a 80 y.o. female with a significant comorbidity back pain, COPD, diabetes, hypertension.  She presents with a new problem of nausea, vomiting, and constipation now resolved with persistent low back pain radiating to her right lateral thigh. Physical exam findings of some numbness and tingling in the right lateral thigh but otherwise neurologically intact with normal reflexes.  Their imaging shows grade 2 anterior listhesis of L5 on S1 with some degeneration at L1/2.    Differential Diagnosis:   Back pain radiating to right thigh  L1/2 degenerative lumbar disc disease with potential spondylolisthesis  Grade 2 anterior listhesis of L5 on S1 with bilateral foraminal stenosis    Recommendations:  Unilateral Back Radiating to the Thigh but not below the Knee with Numbness:      Different diagnosis includes ...  Degenerative spine disease without neurological compression: spondylolisthesis, degenerative disc disease.   Neurogenic: complex regional pain syndrome, common peroneal nerve entrapment  Vascular insufficiency or DVT  MSK: IT band syndrome, trochanteric bursitis, sacroiliitis, Piriformis syndrome.  Psychological/other: multiple myeloma, myofascial pain, conversion disorder, or malingering    Rosemary's signs and symptoms are most concerning for spondylolisthesis resulting in L2 nerve compression given her synptoms of numbness and pain in her right leg that stops at the knee however she remains neurologically intact and therefore sacroiliac joint pain, right hip pathology, or facet arthropathy and myofascial pain cannot be excluded.  Other options include piriformis syndrome, or IT band syndrome which is highly unlikely.    While her grade 2 spondylolisthesis of L5/S1 is impressive that this is most likely [place] as she does not display any L5 or S1 dermatomal signs.    Treatment Options:   At this point I would favor treating her  conservatively.  Her pain appears to be much better controlled today.  Continue with nonsteroidal anti-inflammatories and narcotics as necessary.  Would also suggest potentially using lidocaine patches.  I would like to pursue a course of physical therapy and occupational therapy for 6 weeks and then we will see her back in the office.  In the interim she may wear an LSO brace for comfort.  I explained that she does not have a fracture and this is only to be worn during exacerbations of pain or when she is planning to do excessive activity.  I would also like to obtain an MRI of the lumbar spine and standing lumbar flexion-extension radiographs prior to discharge.  Once this MRI is obtained she may be discharged home.      I discussed the patients findings and my recommendations with patient and family    Thank you very much for this interesting consult.     Level of Risk: High due to:  abrupt change in neurological status  MDM: High  Mod = 88570, Zstb=[id number]  ___________________________________________________________________    Reason for consult: Back pain radiating into the right leg  Consult Requested by: Dr. Sam Boland, Hospitalist    Chief Complaint:   Chief Complaint   Patient presents with   • Weakness - Generalized   • Nausea   • Vomiting       HPI: Rosemary Petersen is a 80 y.o. female with a significant comorbidity back pain, COPD, diabetes, hypertension and past history which includes cholecystectomy. She was in her normal state of health until approximately 3 to 4 weeks ago when she began to experience nausea and vomiting associate with constipation.  Patient was initially evaluated at an outside hospital.  Or based on a noncontrast CT of the abdomen she was suspected to have superior mesenteric artery syndrome.  She was therefore transferred to [facility] for further evaluation.    On further evaluation of [facility] she was not found to have this in her imaging and work-up have been more or  less normal to date.  Her nausea and vomiting has improved as well as her bowel pain and constipation.    Concomitantly she is also complained of back pain radiating into her right leg.  This is exclusively right leg pain.  It radiates from her belt line to her lateral thigh.  She states that is only gone past her knee a couple of times.  This was associated with numbness in the lateral thigh.  She does not recall any falls.  It does not appear to worsen with position.  Her pain was a 9 out of 10 in her back.  But with analgesics including NSAIDs and opioids this has decreased to a 6 out of 10.  She is resting comfortably in bed today.  She does states that there has been some weakness in her right leg and that her leg will give out but for the most part it appears to be neurologically intact.    CT scan of the lumbar spine without contrast was obtained on 2/25/2021 and 3/5/2021.  This appeared to show an unchanged grade 2 anterior listhesis of L5 on S1, mild spinal stenosis at L4/5 and L5/S1, and some advanced degeneration at L1/2 with vacuum disc phenomenon.    Given these findings neurosurgery was consulted for further evaluation and help in management and follow-up.      Review of Systems   HENT: Negative.    Eyes: Negative.    Respiratory: Negative.    Cardiovascular: Negative.    Gastrointestinal: Positive for constipation, nausea and vomiting.   Endocrine: Negative.    Genitourinary: Negative.    Musculoskeletal: Positive for back pain and gait problem.   Skin: Negative.    Allergic/Immunologic: Negative.    Neurological: Positive for weakness and numbness.   Hematological: Negative.    Psychiatric/Behavioral: Negative.         Past Medical History:  has a past medical history of Asthma, Back pain, Back pain, COPD (chronic obstructive pulmonary disease) (CMS/[facility]), Diabetes mellitus (CMS/[facility]), and Hypertension.    Past Surgical History:  has a past surgical history that includes Cholecystectomy; Hysterectomy;  Appendectomy; Esophagogastroduodenoscopy (N/A, 3/2/2021); and Esophagogastroduodenoscopy (N/A, 3/3/2021)..      Family History: family history is not on file. No relevant family history of spinal surgery.    Social History:  reports that she has quit smoking. She quit after 0.00 years of use. She has never used smokeless tobacco. She reports that she does not drink alcohol and does not use drugs.    Allergies: Patient has no known allergies.    Home Medications:   Current Facility-Administered Medications:   •  acetaminophen (TYLENOL) tablet 650 mg, 650 mg, Oral, Q4H PRN, 650 mg at 03/05/21 1524 **OR** acetaminophen (TYLENOL) 160 MG/5ML solution 650 mg, 650 mg, Oral, Q4H PRN **OR** acetaminophen (TYLENOL) suppository 650 mg, 650 mg, Rectal, Q4H PRN, Gallito Dobbs MD  •  albuterol (PROVENTIL) nebulizer solution 0.042% 1.25 mg/3mL, 1.25 mg, Nebulization, Q6H PRN, Gallito Dobbs MD  •  allopurinol (ZYLOPRIM) tablet 300 mg, 300 mg, Oral, Daily, Gallito Dobbs MD, 300 mg at 03/06/21 0821  •  ALPRAZolam (XANAX) tablet 0.25 mg, 0.25 mg, Oral, Q8H PRN, Gallito Dobbs MD, 0.25 mg at 03/05/21 0529  •  [START ON 3/7/2021] amLODIPine (NORVASC) tablet 10 mg, 10 mg, Oral, Q24H, Juanjose Boland MD  •  aspirin EC tablet 81 mg, 81 mg, Oral, Daily, Juanjose Boland MD, 81 mg at 03/06/21 0821  •  butalbital-acetaminophen-caffeine (FIORICET, ESGIC) -40 MG per tablet 1 tablet, 1 tablet, Oral, Q6H PRN, Gallito Dobbs MD, 1 tablet at 03/04/21 2021  •  cyclobenzaprine (FLEXERIL) tablet 5 mg, 5 mg, Oral, TID PRN, Juanjose Boland MD  •  Diclofenac Sodium (VOLTAREN) 1 % gel 4 g, 4 g, Topical, 4x Daily, Juanjose Boland MD, 4 g at 03/06/21 1243  •  indapamide (LOZOL) tablet 2.5 mg, 2.5 mg, Oral, Daily, Juanjose Boland MD, 2.5 mg at 03/06/21 0821  •  levothyroxine (SYNTHROID, LEVOTHROID) tablet 50 mcg, 50 mcg, Oral, Q AM, Gallito Dobbs MD, 50 mcg at 03/06/21 0618  •  lisinopril  (PRINIVIL,ZESTRIL) tablet 20 mg, 20 mg, Oral, Q12H, Elier Heard MD, 20 mg at 03/06/21 0821  •  montelukast (SINGULAIR) tablet 10 mg, 10 mg, Oral, Nightly, Gallito Dobbs MD, 10 mg at 03/05/21 2052  •  ondansetron (ZOFRAN) tablet 4 mg, 4 mg, Oral, Q6H PRN **OR** ondansetron (ZOFRAN) injection 4 mg, 4 mg, Intravenous, Q6H PRN, Gallito Dobbs MD, 4 mg at 03/05/21 1131  •  oxyCODONE-acetaminophen (PERCOCET) 5-325 MG per tablet 1 tablet, 1 tablet, Oral, Q4H PRN, Juanjose Boland MD, 1 tablet at 03/06/21 1235  •  pantoprazole (PROTONIX) EC tablet 40 mg, 40 mg, Oral, Q AM, Elier Heard MD, 40 mg at 03/06/21 0618  •  polyethylene glycol (MIRALAX) packet 17 g, 17 g, Oral, Daily, Gallito Dobbs MD, 17 g at 03/06/21 0821  •  psyllium (METAMUCIL MULTIHEALTH FIBER) 58.12 % packet 1 packet, 1 packet, Oral, Daily, Monika Soto, APRN, 1 packet at 03/06/21 0821  •  rosuvastatin (CRESTOR) tablet 10 mg, 10 mg, Oral, Nightly, Juanjose Boland MD, 10 mg at 03/05/21 2052  •  sennosides-docusate (PERICOLACE) 8.6-50 MG per tablet 1 tablet, 1 tablet, Oral, BID, Elier Heard MD, 1 tablet at 03/06/21 0821  •  sodium chloride 0.9 % flush 10 mL, 10 mL, Intravenous, PRN, Gallito Dobbs MD  •  sodium chloride 0.9 % flush 10 mL, 10 mL, Intravenous, Q12H, Gallito Dobbs MD, 10 mL at 03/03/21 2039  •  sodium chloride 0.9 % flush 10 mL, 10 mL, Intravenous, PRN, Gallito Dobbs MD  •  sodium chloride 0.9 % flush 10 mL, 10 mL, Intravenous, Q12H, Aislinn Kevin CRNA, 10 mL at 03/06/21 0830  •  sodium chloride 0.9 % flush 10 mL, 10 mL, Intravenous, PRN, Aislinn Kevin CRNA  •  sodium chloride 0.9 % infusion, 50 mL/hr, Intravenous, Continuous, Elier Heard MD, Last Rate: 50 mL/hr at 03/05/21 2105, 50 mL/hr at 03/05/21 2105  •  sucralfate (CARAFATE) tablet 1 g, 1 g, Oral, 4x Daily AC & at Bedtime, Juanjose Boland MD, 1 g at 03/06/21 0821    Inpatient  Medications: Scheduled Meds:allopurinol, 300 mg, Oral, Daily  [START ON 3/7/2021] amLODIPine, 10 mg, Oral, Q24H  aspirin, 81 mg, Oral, Daily  Diclofenac Sodium, 4 g, Topical, 4x Daily  indapamide, 2.5 mg, Oral, Daily  levothyroxine, 50 mcg, Oral, Q AM  lisinopril, 20 mg, Oral, Q12H  montelukast, 10 mg, Oral, Nightly  pantoprazole, 40 mg, Oral, Q AM  polyethylene glycol, 17 g, Oral, Daily  psyllium, 1 packet, Oral, Daily  rosuvastatin, 10 mg, Oral, Nightly  senna-docusate sodium, 1 tablet, Oral, BID  sodium chloride, 10 mL, Intravenous, Q12H  sodium chloride, 10 mL, Intravenous, Q12H  sucralfate, 1 g, Oral, 4x Daily AC & at Bedtime      Continuous Infusions:sodium chloride, 50 mL/hr, Last Rate: 50 mL/hr (03/05/21 2105)      PRN Meds:.•  acetaminophen **OR** acetaminophen **OR** acetaminophen  •  albuterol  •  ALPRAZolam  •  butalbital-acetaminophen-caffeine  •  cyclobenzaprine  •  ondansetron **OR** ondansetron  •  oxyCODONE-acetaminophen  •  sodium chloride  •  sodium chloride  •  sodium chloride  Home Medications:   Prior to Admission medications    Medication Sig Start Date End Date Taking? Authorizing Provider   albuterol (ACCUNEB) 1.25 MG/3ML nebulizer solution Take 1 ampule by nebulization Every 6 (Six) Hours As Needed for Wheezing.   Yes Rhianna Worley MD   allopurinol (ZYLOPRIM) 300 MG tablet Take 300 mg by mouth Daily.   Yes Rhianna Worley MD   ALPRAZolam (XANAX) 0.25 MG tablet Take 0.25 mg by mouth At Night As Needed for Anxiety or Sleep.   Yes Rhianna Worley MD   ascorbic acid (VITAMIN C) 500 MG tablet Take 500 mg by mouth 2 (Two) Times a Day.   Yes Rhianna Worley MD   aspirin 81 MG chewable tablet Chew 81 mg Daily.   Yes Rhianna Worley MD   Butalbital-APAP-Caffeine (Esgic) -40 MG per capsule Take 1 capsule by mouth Every 4 (Four) Hours As Needed for Headache.   Yes Rhianna Worley MD   dicyclomine (BENTYL) 10 MG capsule Take 10 mg by mouth 3 (Three) Times a  Day As Needed (abdominal cramping).   Yes Rhianna Worley MD   doxycycline (VIBRAMYCIN) 100 MG capsule Take 100 mg by mouth Daily.   Yes Rhianna Worley MD   Ezetimibe (ZETIA PO) Take 10 mg by mouth Daily.   Yes Rhianna Worley MD   ferrous sulfate 325 (65 FE) MG tablet Take 325 mg by mouth 2 (two) times a day.   Yes Rhianna Worley MD   indapamide (LOZOL) 1.25 MG tablet Take 1.25 mg by mouth Daily.   Yes Rhianna Worley MD   levalbuterol (XOPENEX HFA) 45 MCG/ACT inhaler Inhale 2 puffs Every 4 (Four) Hours As Needed for Wheezing.   Yes Rhianna Worley MD   levothyroxine (SYNTHROID, LEVOTHROID) 50 MCG tablet Take 50 mcg by mouth Daily.   Yes Rhianna Worley MD   lisinopril (PRINIVIL,ZESTRIL) 20 MG tablet Take 20 mg by mouth 2 (Two) Times a Day.   Yes Rhianna Worley MD   metFORMIN (GLUCOPHAGE) 500 MG tablet Take 500 mg by mouth 2 (Two) Times a Day With Meals.   Yes Rhianna Worley MD   montelukast (SINGULAIR) 10 MG tablet Take 10 mg by mouth Every Night.   Yes Rhianna Worley MD   naproxen (NAPROSYN) 500 MG tablet Take 500 mg by mouth 2 (Two) Times a Day With Meals.   Yes hRianna Worley MD   omeprazole (priLOSEC) 20 MG capsule Take 20 mg by mouth 2 (two) times a day.   Yes Rhianna Worley MD   oxyCODONE-acetaminophen (PERCOCET) 5-325 MG per tablet Take 1 tablet by mouth Every 6 (Six) Hours As Needed for Severe Pain . 2/25/21  Yes Scooter Carlson MD   promethazine-codeine (PHENERGAN with CODEINE) 6.25-10 MG/5ML syrup Take 5-10 mL by mouth Every 4 (Four) Hours As Needed for Cough.   Yes Rhianna Worley MD   rosuvastatin (Crestor) 40 MG tablet Take 40 mg by mouth Daily.   Yes Rhianna Worley MD   theophylline (UNIPHYL) 600 MG 24 hr tablet Take 300 mg by mouth 2 (two) times a day.   Yes Rhianna Worley MD        Vital Signs  Temp:  [98.1 °F (36.7 °C)-98.6 °F (37 °C)] 98.3 °F (36.8 °C)  Heart Rate:  [72-80] 76  Resp:  [16-18]  18  BP: (134-180)/(68-90) 161/85    Physical Exam  Physical Exam  Constitutional:       Appearance: She is well-developed.   HENT:      Head: Normocephalic and atraumatic.   Eyes:      Extraocular Movements: EOM normal.      Pupils: Pupils are equal, round, and reactive to light.   Pulmonary:      Effort: Pulmonary effort is normal.      Breath sounds: Normal breath sounds.   Abdominal:      Palpations: Abdomen is soft.   Musculoskeletal:         General: Normal range of motion.      Cervical back: Normal range of motion and neck supple.   Skin:     General: Skin is warm and dry.   Neurological:      Mental Status: She is oriented to person, place, and time.      Coordination: Finger-Nose-Finger Test and Heel to Shin Test normal.      Gait: Gait is intact. Tandem walk normal.      Deep Tendon Reflexes:      Reflex Scores:       Tricep reflexes are 2+ on the right side and 2+ on the left side.       Bicep reflexes are 2+ on the right side and 2+ on the left side.       Brachioradialis reflexes are 2+ on the right side and 2+ on the left side.       Patellar reflexes are 2+ on the right side and 2+ on the left side.       Achilles reflexes are 2+ on the right side and 2+ on the left side.  Psychiatric:         Speech: Speech normal.         Neurologic Exam     Mental Status   Oriented to person, place, and time.   Registration: recalls 3 of 3 objects. Recall at 5 minutes: recalls 3 of 3 objects.   Attention: normal. Concentration: normal.   Speech: speech is normal   Level of consciousness: alert  Knowledge: consistent with education.     Cranial Nerves     CN II   Visual acuity: normal    CN III, IV, VI   Pupils are equal, round, and reactive to light.  Extraocular motions are normal.   Diplopia: none    CN V   Facial sensation intact.   Right corneal reflex: normal  Left corneal reflex: normal    CN VII   Right facial weakness: none  Left facial weakness: none    CN VIII   Hearing: intact    CN IX, X   Palate:  symmetric  Right gag reflex: normal  Left gag reflex: normal    CN XI   Right trapezius strength: normal  Left trapezius strength: normal    CN XII   Tongue deviation: none    Motor Exam   Muscle bulk: normal  Right arm tone: normal  Left arm tone: normal  Right arm pronator drift: absent  Left arm pronator drift: absent  Right leg tone: normal  Left leg tone: normal    Strength   Right deltoid: 5/5  Left deltoid: 5/5  Right biceps: 5/5  Left biceps: 5/5  Right triceps: 5/5  Left triceps: 5/5  Right interossei: 5/5  Left interossei: 5/5  Right iliopsoas: 5/5  Left iliopsoas: 5/5  Right quadriceps: 5/5  Left quadriceps: 5/5  Right anterior tibial: 5/5  Left anterior tibial: 5/5  Right gastroc: 5/5  Left gastroc: 5/5Right EHL 5/5   Left EHL 5/5     Sensory Exam   Light touch normal.   Proprioception normal.   Sensory deficit distribution on right: L2    Gait, Coordination, and Reflexes     Gait  Gait: normal    Coordination   Finger to nose coordination: normal  Heel to shin coordination: normal  Tandem walking coordination: normal    Reflexes   Right brachioradialis: 2+  Left brachioradialis: 2+  Right biceps: 2+  Left biceps: 2+  Right triceps: 2+  Left triceps: 2+  Right patellar: 2+  Left patellar: 2+  Right achilles: 2+  Left achilles: 2+  Right plantar: normal  Left plantar: normal  Right Frankel: absent  Left Frankel: absent  Right ankle clonus: absent  Left ankle clonus: absent      Results Review:   Independent review and interpretation of imaging  Imaging Results (Last 24 Hours)     ** No results found for the last 24 hours. **        Radiology Results          CT Lumbar Spine Without Contrast    Result Date: 3/5/2021  EXAMINATION: CT LUMBAR SPINE WO CONTRAST- 3/5/2021 4:19 PM CST  HISTORY: Low back pain  COMPARISON: CT lumbar spine without contrast 2/25/2021  DOSE: 297 mGy-cm  TECHNIQUE: Sequential imaging was performed through the lumbar spine without the use of IV contrast.  Sagittal and coronal  reformations were made from the original source data and reviewed. Automated exposure control was also utilized to decrease patient radiation dose.  FINDINGS: There is persistent grade 1-2 anterolisthesis of L5 on S1 measuring 12 mm. There is grade 1 retrolisthesis of L1 on L2 measuring 4 mm. Alignment of the lumbar spine otherwise appears normal. Vertebral body heights appear well maintained. There is no evidence of perched facet. The spinous processes appear intact. Endplate degenerative spurring is evident at all levels of the lumbar spine. There is loss of disc space height with vacuum disc phenomenon at L1-L2. Vacuum disc phenomenon is also evident at L5-S1. There is severe bilateral neuroforaminal narrowing at L5-S1. There appears to be severe spinal canal stenosis at L4-L5 associated with diffuse disc bulge and ligamentous hypertrophy. Some spinal canal stenosis is also suspected at L5-S1.  Review of the visualized paraspinal soft tissues demonstrates a large hiatal hernia. There is some atelectasis in the lung bases. Atherosclerotic calcifications are seen within the aorta and its branch vessels. Colonic diverticula are present.      Impression: 1. Unchanged appearance compared to 2/25/2021. 2. Grade 1-2 anterolisthesis of L5 on S1 with bilateral neuroforaminal narrowing. 3. Spinal canal stenosis at L4-L5 and L5-S1.  This report was finalized on 03/05/2021 16:22 by Dr. Johan Peña MD.    CT Lumbar Spine Without Contrast    Result Date: 2/25/2021  EXAMINATION: CT LUMBAR SPINE WITHOUT IV CONTRAST 2/25/2021  COMPARISON: None.  HISTORY: Female, 80 years-old. lower back pain, radiation down her leg on right  TECHNIQUE: Multiple CT images were obtained of the lumbar spine without IV contrast. The images were formatted in the axial, coronal and sagittal planes.  Radiation dose equals  mGy-cm.  Automated exposure control dose reduction technique was implemented.  FINDINGS:  Exaggeration of the normal  lordosis of the lumbar spine.No evidence of acute fracture. There is no abnormal soft tissue density within the spinal canal.9 mm anterolisthesis of L5 on S1, without pars defect identified.Multilevel degenerative disc disease, predominantly [id number]. Mild spinal canal stenosis atL4-5 with disc bulge and ligamentum flavum hypertrophy. Moderate to severe bilateral foraminal stenosis at L5-S1 related to uncovering of the disc, ligamentum flavum hypertrophy and facet arthropathy. Disc protrusion at L1-L2, without significant spinal canal stenosis. The prevertebral and paraspinal soft tissues are unremarkable.       Impression: 1.  No acute osseous pathology 2.  Alignment as above. 3.  Moderate to severe bilateral foraminal stenosis at L5-S1.   This report was finalized on 02/25/2021 22:15 by Dr. Gina Zamora MD.           I reviewed the patient's new clinical results.  Lab Results (last 24 hours)     ** No results found for the last 24 hours. **          Nhan Dickson MD

## 2021-03-06 NOTE — PLAN OF CARE
Goal Outcome Evaluation:  Plan of Care Reviewed With: patient     Outcome Summary: Pt c/o pain in right leg. One time dose of Toradol and Solumedrol given. Norco d/c and Percocet given. Pt reported some relief of pain. SCD's on. IVF. Blood pressure elevated this morning; routine BP meds given and BP rechecked. No c/o N/V.

## 2021-03-06 NOTE — PLAN OF CARE
Goal Outcome Evaluation:  Plan of Care Reviewed With: patient, grandchild(abe)  Progress: no change  Outcome Summary: Pt c/o back pain radiating down right leg PRN pain meds given per orders, family at bedside, no c/o N/V, cont to have decreased appetite, up to BSC, will cont to monitor

## 2021-03-06 NOTE — PROGRESS NOTES
[facility] Medicine Services  INPATIENT PROGRESS NOTE    Patient Name: Rosemary Petersen  Date of Admission: 3/1/2021  Today's Date: 03/06/21  Length of Stay: 1  Primary Care Physician: Daquan Bautista MD    Subjective   Chief Complaint: Right leg pain  Weakness - Generalized  Associated symptoms include nausea and vomiting.   Nausea  Associated symptoms include nausea and vomiting.   Vomiting     Patient has had a rough night.  Pain continues to be a problem.  When asked to localize her pain she again rubs her proximal right lower extremity, but does admit to having some pain in her low back as well.  She does describe it as a burning sensation.  No focal motor deficits.  She has not had any additional vomiting episodes since I assessed her yesterday.  Unable to get hardly any rest last night given ongoing pain symptoms.  Family is at bedside and reports that she remains restless trying to find a position that will provide some comfort.  The Mobic, Tylenol, Voltaren gel, aqua K pad and Norco have been ineffective at treating her symptoms at this juncture.    Review of Systems   Gastrointestinal: Positive for nausea and vomiting.        All pertinent negatives and positives are as above. All other systems have been reviewed and are negative unless otherwise stated.     Objective    Temp:  [97.8 °F (36.6 °C)-98.6 °F (37 °C)] 98.6 °F (37 °C)  Heart Rate:  [72-83] 72  Resp:  [16-18] 18  BP: (134-180)/(68-96) 158/80  Physical Exam  Vitals reviewed.   Constitutional:       Appearance: She is ill-appearing. She is not toxic-appearing.   HENT:      Head: Normocephalic.      Mouth/Throat:      Pharynx: No oropharyngeal exudate.   Eyes:      General: No scleral icterus.  Cardiovascular:      Rate and Rhythm: Normal rate.   Pulmonary:      Effort: Pulmonary effort is normal. No respiratory distress.   Abdominal:      Palpations: Abdomen is soft.      Hernia: A hernia is present.    Musculoskeletal:         General: No deformity.      Comments: No focal right hip pain or deformity.  No deformity from right hip to right knee appreciated.  No focal or pinpoint lower back (lumbar) or paraspinal TTP.   Skin:     General: Skin is warm.      Capillary Refill: Capillary refill takes less than 2 seconds.   Neurological:      Mental Status: She is alert.      Sensory: No sensory deficit.      Motor: Weakness present.         Results Review:  I have reviewed the labs, radiology results, and diagnostic studies.    Laboratory Data:   Results from last 7 days   Lab Units 03/05/21 0528 03/03/21 0535 03/01/21 2058   WBC 10*3/mm3 5.41 3.99 6.10   HEMOGLOBIN g/dL 11.8* 10.9* 11.7*   HEMATOCRIT % 37.2 33.9* 36.6   PLATELETS 10*3/mm3 265 262 275        Results from last 7 days   Lab Units 03/05/21 0528 03/03/21 0535 03/01/21 2058   SODIUM mmol/L 139 138 137   POTASSIUM mmol/L 3.9 4.9 5.0   CHLORIDE mmol/L 103 107 102   CO2 mmol/L 27.0 23.0 23.0   BUN mg/dL 12 28* 55*   CREATININE mg/dL 0.82 1.17* 2.01*   CALCIUM mg/dL 10.3 9.3 10.4   BILIRUBIN mg/dL  --  <0.2 0.2   ALK PHOS U/L  --  120* 146*   ALT (SGPT) U/L  --  31 47*   AST (SGOT) U/L  --  30 47*   GLUCOSE mg/dL 121* 95 119*       Culture Data:   No results found for: BLOODCX, URINECX, WOUNDCX, MRSACX, RESPCX, STOOLCX    Radiology Data:   Imaging Results (Last 24 Hours)     ** No results found for the last 24 hours. **          I have reviewed the patient's current medications.     Assessment/Plan     Active Hospital Problems    Diagnosis    • Acute kidney injury (CMS/HCC)    • Moderate malnutrition (CMS/HCC)    • Constipation    • Gastritis    • Hiatal hernia    • Ventral hernia    • Hypertension    • Right leg pain    • Weight loss    • Intractable nausea and vomiting    • Low back pain      Plan:  1.  CT L-spine and RLE results reviewed  2.  Ask for NSGY assessment  3.  Toradol IV X 1  4.  IV steroid X 1  5.  Transition from PO Lily to Percocet -  "this has resulted in better pain control in the past  6.  Hold Mobic for now  7.  Flexeril PRN  8.  Continue PPI and Carafate  9.  Aqua K pad and trial of Voltaren gel.  10.  Nutrition [id number].  Patient/family report approximately 40 pounds of weight loss \"since last fall\" and report that her weight loss seems to have accelerated over the past month.  11.  Indapamide; titrate Norvasc; Lisinopril; monitor blood pressure trend  12.  Bowel regimen - has been prone to constipation   13.  Physical therapy assessment  14.  Vascular does not feel imaging consistent with an ischemic etiology (SMA)  15.  Dispo planning.  Would like to see how patient does with PT.  Patient lives at home alone with family support.      Electronically signed by Juanjose Boland MD, 03/06/21, 11:23 CST.  "

## 2021-03-07 NOTE — NURSING NOTE
When giving nighttime meds, patient was woken up and had new onset confusion and aphasia. Neuro checks completed and were intact. Previous vital signs WNL, new set obtained that were also WNL. Blood sugar was checked and was 202. Pt was easily aroused but seemed very sleepy. Pt was unable to tell me her birthday, where she was, or her pain level. She stated she knows the answer to these questions she was just unable to get her words out. Daughter at bedside stated this is new for the patient. On-call physician and charge nurse notified. Upon further assessment by charge nurse, patient was able to answer questions correctly. Pt was able to take medication and [place] [facility] [place]. Currently resting in bed, safety maintained. Will continue to assess for any changes.

## 2021-03-07 NOTE — DISCHARGE SUMMARY
[facility] Medicine Services  DISCHARGE SUMMARY       Date of Admission: 3/1/2021  Date of Discharge:  3/7/2021  Primary Care Physician: Daquan Bautista MD    Presenting Problem/History of Present Illness:  Acute kidney injury (EHSAN) with acute tubular necrosis (ATN) (CMS/HCC) [N17.0]  Acute kidney injury (EHSAN) with acute tubular necrosis (ATN) (CMS/HCC) [N17.0]  Acute kidney injury (EHSAN) with acute tubular necrosis (ATN) (CMS/HCC) [N17.0]     Final Discharge Diagnoses:  Active Hospital Problems    Diagnosis    • Acute kidney injury (CMS/HCC)    • Moderate malnutrition (CMS/HCC)    • Constipation    • Gastritis    • Hiatal hernia    • Ventral hernia    • Hypertension    • Right leg pain    • Weight loss    • Intractable nausea and vomiting    • Low back pain        Consults:  1.  Gastroenterology  2.  Neurosurgery  3.  Vascular Surgery    Procedures Performed:   Imaging Results (Last 7 Days)     Procedure Component Value Units Date/Time    MRI Lumbar Spine Without Contrast [[id number]] Collected: 03/07/21 1044     Updated: 03/07/21 1054    Narrative:      MRI LUMBAR SPINE WO CONTRAST- 3/7/2021 9:06 AM CST     HISTORY: Spondylolisthesis; N17.0-Acute kidney failure with tubular  necrosis; R11.2-Nausea with vomiting, unspecified     COMPARISON: CT scan dated 3/5/2021      TECHNIQUE: Multiplanar, multisequence MRI of the lumbar spine was  performed without the use of contrast.     FINDINGS:      Alignment: There are presumed to be 5 lumbar-type vertebrae with the  most inferior being labeled L5. The spine is imaged from T12 through S5.  There is a 3 mm degenerative retrolisthesis at L1-L2. There is an  additional 10 mm degenerative anterolisthesis at L5-S1.     Marrow signal: Degenerative endplate signal changes at L5-S1, mostly  type III. No pathologic marrow infiltrate. No acute fracture. No  spondylitic defects are identified.     Cord: The conus medullaris terminates at the level of  L1. The visualized  spinal cord is normal in signal and morphology.      Soft tissues: The surrounding soft tissues are unremarkable.      Levels:      L1-L2: Loss of disc height with diffuse disc bulging. Only mild spinal  stenosis. No high-grade neuroforaminal narrowing.     L2-L3: Loss of disc height with minimal disc bulging. No significant  spinal stenosis or neuroforaminal narrowing.     L3-L4: Disc height is well-maintained. No spinal stenosis or significant  neuroforaminal narrowing.     L4-L5: Mild diffuse disc bulging. Bilateral facet hypertrophy with  ligamentum flavum thickening. There is a severe spinal stenosis at this  level. Facet arthropathy causes a moderate bilateral neuroforaminal  narrowing.     L5-S1: Severe loss of disc height with endplate sclerosis and mild  endplate spurring. There is actually a small migratory disc extrusion at  this level extending superiorly. Most notably is the 10 mm degenerative  listhesis which results in a severe spinal stenosis. There is bilateral  severe neuroforaminal narrowing.       Impression:      1. Degenerative listhesis at L1-L2 and L5-S1. The 10 mm listhesis at  L5-S1 causes a severe spinal stenosis.  2. Severe spinal stenosis above this at L4-5, mostly due to the facet  hypertrophy and ligamentum flavum thickening.  3. There is a small migratory disc extrusion at L5-S1 although this does  not contribute significantly to the spinal stenosis at this level.  4. High-grade bilateral neuroforaminal narrowing at L5-S1.     This report was finalized on 03/07/2021 10:51 by Dr Ren Dyer, .    XR Spine Lumbar Complete With Flex & Ext [[id number]] Collected: 03/07/21 1040     Updated: 03/07/21 1051    Narrative:      XR SPINE LUMBAR COMPLETE W FLEX EXT- 3/7/2021 9:32 AM CST     HISTORY: back pain radiating to right leg.; N17.0-Acute kidney failure  with tubular necrosis; R11.2-Nausea with vomiting, unspecified     COMPARISON: CT scan dated 3/5/2021, MRI dated  3/7/2021      FINDINGS:   Frontal, lateral, bilateral oblique, flexion-extension and coned-down  lateral views of the lumbar spine are provided. There are presumed to be  5 lumbar vertebral bodies, with the inferior-most visualized disc space  being designated as L5-S1 for the purpose of numbering.      No acute osseous injury. Vertebral body heights are well-maintained.  There is a 3 mm degenerative retrolisthesis at L1-L2 in the neutral  position. Additional 11 mm anterior listhesis at L5-S1 in the neutral  position. No spondylitic defects are seen. Minimal changes identified in  the listhesis with flexion and extension. Severe loss of disc height at  the L5-S1 level and there is advanced facet arthropathy at L4-5 and  L5-S1. Included portions of the osseous pelvis are intact. Incidental  surgical clips and atheromatous vascular calcification.       Impression:      1. No acute osseous injury seen.  2. Degenerative listhesis at L1-L2 and L5-S1. Listhesis at L5-S1 is  measured at L5-S1, and I do not appreciate any significant dynamic  instability on the flexion-extension views.     This report was finalized on 03/07/2021 10:44 by Dr Ren Dyer, .    CT Lower Extremity Right Without Contrast [[id number]] Collected: 03/05/21 1628     Updated: 03/05/21 1633    Narrative:      EXAMINATION: CT LOWER EXTREMITY RIGHT WO CONTRAST- 3/5/2021 4:28 PM CST     HISTORY: Chronic hip pain     COMPARISON: Right hip x-ray 3/2/2021     DOSE: 134 mGy-cm     TECHNIQUE: Sequential imaging was performed through the right lower  extremity without the use of IV contrast.  Sagittal and coronal  reformations were made from the original source data and reviewed.  Automated exposure control was also utilized to decrease patient  radiation dose.     FINDINGS:   There is no evidence of acute fracture. The right femoral head appears  appropriately located in the acetabulum without evidence of dislocation.  There is diffuse joint space  narrowing, suggesting proximal migration  associated with arthritis. A subchondral cyst is seen in the femoral  head. No focal intramuscular abnormality is identified.     Review of the visualized soft tissues demonstrates atherosclerotic  calcifications in the superficial femoral artery. A mildly prominent but  nonpathologically enlarged right inguinal lymph node measures 10 mm in  short axis. There is extensive colonic diverticulosis.       Impression:      1. Arthritic changes of the right hip without acute osseous abnormality  identified.     2. Colonic diverticulosis.     3. Atherosclerotic disease.     This report was finalized on 03/05/2021 16:30 by Dr. Johan Peña MD.    CT Lumbar Spine Without Contrast [[id number]] Collected: 03/05/21 1619     Updated: 03/05/21 1625    Narrative:      EXAMINATION: CT LUMBAR SPINE WO CONTRAST- 3/5/2021 4:19 PM CST     HISTORY: Low back pain     COMPARISON: CT lumbar spine without contrast 2/25/2021     DOSE: 297 mGy-cm     TECHNIQUE: Sequential imaging was performed through the lumbar spine  without the use of IV contrast.  Sagittal and coronal reformations were  made from the original source data and reviewed. Automated exposure  control was also utilized to decrease patient radiation dose.     FINDINGS:   There is persistent grade 1-2 anterolisthesis of L5 on S1 measuring 12  mm. There is grade 1 retrolisthesis of L1 on L2 measuring 4 mm.  Alignment of the lumbar spine otherwise appears normal. Vertebral body  heights appear well maintained. There is no evidence of perched facet.  The spinous processes appear intact. Endplate degenerative spurring is  evident at all levels of the lumbar spine. There is loss of disc space  height with vacuum disc phenomenon at L1-L2. Vacuum disc phenomenon is  also evident at L5-S1. There is severe bilateral neuroforaminal  narrowing at L5-S1. There appears to be severe spinal canal stenosis at  L4-L5 associated with diffuse disc bulge  and ligamentous hypertrophy.  Some spinal canal stenosis is also suspected at L5-S1.      Review of the visualized paraspinal soft tissues demonstrates a large  hiatal hernia. There is some atelectasis in the lung bases.  Atherosclerotic calcifications are seen within the aorta and its branch  vessels. Colonic diverticula are present.       Impression:      1. Unchanged appearance compared to 2/25/2021.  2. Grade 1-2 anterolisthesis of L5 on S1 with bilateral neuroforaminal  narrowing.  3. Spinal canal stenosis at L4-L5 and L5-S1.     This report was finalized on 03/05/2021 16:22 by Dr. Johan Peña MD.    XR Hip With or Without Pelvis 2 - 3 View Right [[id number]] Collected: 03/02/21 1013     Updated: 03/02/21 1018    Narrative:      HISTORY: Right hip pain     Right hip: Frontal view the pelvis with frontal frog-leg lateral views  of the right hip are obtained.     There is moderate bilateral hip joint space narrowing. A round  calcification left of L5 transverse process corresponds to a calcified  phlebolith based on CT abdomen pelvis 3/2/2021. Smaller phleboliths  present within left lower pelvis. No fracture or dislocation is  identified. No suspicious focal bony lesions.       Impression:      1. Mild to moderate arthritic narrowing of the hip joints with no acute  osseous pathology  This report was finalized on 03/02/2021 10:14 by Dr. Monika Bal MD.    CT Abdomen Pelvis Without Contrast [[id number]] Collected: 03/02/21 0750     Updated: 03/02/21 0800    Narrative:      CT ABDOMEN PELVIS WO CONTRAST- 3/2/2021 2:05 AM CST     HISTORY: Abdominal abscess/infection suspected      COMPARISON: 2/25/2021      DLP: 244 mGy cm. Automated exposure control was utilized to diminish  patient radiation dose.     TECHNIQUE: Noncontrast enhanced images of the abdomen and pelvis  obtained without oral contrast.      FINDINGS:   Bibasilar atelectasis is present. There is mild cardiomegaly. There is a  trace pericardial  effusion or pericardial thickening. A large hiatal  hernia is present..      LIVER: No focal liver lesion.      BILIARY SYSTEM: The gallbladder has been surgically removed. There is no  evidence of intra or extrahepatic dilatation..      PANCREAS: No focal pancreatic lesion.      SPLEEN: Unremarkable.      KIDNEYS AND ADRENALS: There is a 5 mm nonobstructing calculus lower pole  calyx right kidney. The adrenals are unremarkable. Nodes of discrete  renal mass or left-sided nephrolithiasis..  The ureters are decompressed  and normal in appearance.     RETROPERITONEUM: No mass, lymphadenopathy or hemorrhage.      GI TRACT: There is moderate constipation with increased stool throughout  the colon. There is a ventral wall hernia containing a segment of  transverse colon without evidence of incarceration or obstruction.  Diverticulosis is noted the descending and sigmoid colon with no  radiographic evidence of acute diverticulitis.. The appendix is  surgically absent..     OTHER: There is no mesenteric mass, lymphadenopathy or fluid collection.  The osseous structures and soft tissues demonstrate no worrisome  lesions. Grade 2 spondylolisthesis is noted of L5 on S1. There is mild  retrolisthesis of L1 on L2. No acute fractures are present. No  suspicious lesions are present.      PELVIS: The patient's undergone prior hysterectomy. There is no free  fluid or pelvic mass demonstrated.. The urinary bladder is normal in  appearance.       Impression:      1. Moderate constipation. There is a ventral wall hernia containing a  segment of the transverse colon without evidence of incarceration or  obstruction. The patient is status post hysterectomy, cholecystectomy  and appendectomy.  2. Large hiatal hernia.  3. Nonobstructing calculus lower pole calyx right kidney. No evidence of  ureteral stone or obstructive uropathy..         This report was finalized on 03/02/2021 07:56 by Dr. Luis Darnell MD.    XR Chest 1 View  [[id number]] Collected: 03/01/21 2153     Updated: 03/01/21 2156    Narrative:      EXAM: XR CHEST 1 VW- 3/1/2021 9:44 PM CST     HISTORY: Weak/Dizzy/AMS triage protocol       COMPARISON: None.     TECHNIQUE: Frontal radiograph of the chest     FINDINGS:   Chronic changes present in the pulmonary parenchyma. Mild hyperinflation  is noted.. Cardiac silhouette is normal. Vascular calcifications present  aortic arch and descending thoracic aorta..      The osseous structures and surrounding soft tissues demonstrate no acute  abnormality.          Impression:      1. Chronic change in the pulmonary parenchyma with no acute  cardiopulmonary process. Skin fold projects over the left chest.        This report was finalized on 03/01/2021 21:53 by Dr. Herman Feliciano MD.          Pertinent Test Results:   Lab Results (last 7 days)     Procedure Component Value Units Date/Time    POC Glucose Once [[id number]]  (Abnormal) Collected: 03/06/21 2133    Specimen: Blood Updated: 03/06/21 2154     Glucose 202 mg/dL      Comment: : [id number] Paul SimonaMeter ID: [id number]       Basic Metabolic Panel [[id number]]  (Abnormal) Collected: 03/05/21 0528    Specimen: Blood Updated: 03/05/21 0633     Glucose 121 mg/dL      BUN 12 mg/dL      Creatinine 0.82 mg/dL      Sodium 139 mmol/L      Potassium 3.9 mmol/L      Chloride 103 mmol/L      CO2 27.0 mmol/L      Calcium 10.3 mg/dL      eGFR Non African Amer 67 mL/min/1.73      BUN/Creatinine Ratio 14.6     Anion Gap 9.0 mmol/L     Narrative:      GFR Normal >60  Chronic Kidney Disease <60  Kidney Failure <15      CBC (No Diff) [[id number]]  (Abnormal) Collected: 03/05/21 0528    Specimen: Blood Updated: 03/05/21 0554     WBC 5.41 10*3/mm3      RBC 4.37 10*6/mm3      Hemoglobin 11.8 g/dL      Hematocrit 37.2 %      MCV 85.1 fL      MCH 27.0 pg      MCHC 31.7 g/dL      RDW 17.5 %      RDW-SD 53.9 fl      MPV 11.0 fL      Platelets 265 10*3/mm3     Urease For H Pylori - Tissue, Stomach  "[[id number]]  (Normal) Collected: 03/03/21 1303    Specimen: Tissue from Stomach Updated: 03/04/21 1535     Urease Negative    Tissue Pathology Exam [[id number]] Collected: 03/03/21 1304    Specimen: Tissue from Stomach, Tissue from Small Intestine, Tissue from Esophagus, Tissue from Stomach Updated: 03/04/21 1423     Case Report --     Surgical Pathology Report                         Case: [id number]                                  Authorizing Provider:  Gallito Dobbs MD      Collected:           03/03/2021 01:04 PM          Ordering Location:     [facility]     Received:            03/03/2021 02:46 PM                                 ENDOSCOPY                                                                    Pathologist:           Shawn Polanco MD                                                      Specimens:   1) - Stomach, Bx: gastric survey                                                                    2) - Small Intestine, distal small bowel tissue Bx                                                  3) - Esophagus, distal esophageal Bx                                                                4) - Stomach, gastric polyps x3                                                             Final Diagnosis --     1.  Gastric biopsy:  Chronic gastritis.    2.  Distal small bowel, biopsy:  Histologically normal small bowel mucosa.    3.  Distal esophagus, biopsy:  Mild reflux esophagitis.    4.  Gastric polyps, biopsies:  Hyperplastic gastric polyps.  Chronic gastritis.       Gross Description --     Specimen #1 is received in a formalin filled container labeled with the patient's name, date of birth, and \"biopsy gastric survey\".  The specimen consists of 3 yellow-pink soft tissue fragments aggregating to 0.5 x 0.4 x 0.1 cm, totally submitted in block 1A.    Specimen #2 is received in a formalin filled container labeled with the patient's name, date of birth, and \"distal small bowel " "tissue biopsy\".  The specimen consists of 2 yellow-pink soft tissue fragments aggregating to 0.4 x 0.2 x 0.1 cm, totally submitted in block 2A.    Specimen #3 is received in a formalin filled container labeled with the patient's name, date of birth, and \"distal esophageal biopsy\".  The specimen consists of 1 yellow to gray soft tissue fragment measuring 0.1 x 0.1 x 0.1 cm, totally submitted in block 3A.    Specimen #4 is received in a formalin filled container labeled with the patient's name, date of birth, and \"gastric polyps x3\".  The specimen consists of 4 yellow-pink, polypoid tissue fragments aggregating to 0.8 x 0.6 x 0.3 cm.  The specimen is totally submitted in block 4A.       Microscopic Description --     A microscopic evaluation was performed on each specimen.      Iron Profile [[id number]]  (Abnormal) Collected: 03/04/21 0627    Specimen: Blood Updated: 03/04/21 0701     Iron 50 mcg/dL      Iron Saturation 20 %      Transferrin 168 mg/dL      TIBC 250 mcg/dL     Ammonia [[id number]]  (Abnormal) Collected: 03/03/21 1512    Specimen: Blood Updated: 03/03/21 1554     Ammonia <10 umol/L     Phosphorus [[id number]]  (Normal) Collected: 03/03/21 0535    Specimen: Blood Updated: 03/03/21 1318     Phosphorus 3.0 mg/dL     Comprehensive Metabolic Panel [[id number]]  (Abnormal) Collected: 03/03/21 0535    Specimen: Blood Updated: 03/03/21 0703     Glucose 95 mg/dL      BUN 28 mg/dL      Creatinine 1.17 mg/dL      Sodium 138 mmol/L      Potassium 4.9 mmol/L      Comment: Slight hemolysis detected by analyzer. Results may be affected.        Chloride 107 mmol/L      CO2 23.0 mmol/L      Calcium 9.3 mg/dL      Total Protein 6.2 g/dL      Albumin 3.30 g/dL      ALT (SGPT) 31 U/L      AST (SGOT) 30 U/L      Comment: Slight hemolysis detected by analyzer. Results may be affected.        Alkaline Phosphatase 120 U/L      Total Bilirubin <0.2 mg/dL      eGFR Non African Amer 44 mL/min/1.73      Globulin 2.9 gm/dL      A/G " Ratio 1.1 g/dL      BUN/Creatinine Ratio 23.9     Anion Gap 8.0 mmol/L     Narrative:      GFR Normal >60  Chronic Kidney Disease <60  Kidney Failure <15      CBC Auto Differential [[id number]]  (Abnormal) Collected: 03/03/21 0535    Specimen: Blood Updated: 03/03/21 0637     WBC 3.99 10*3/mm3      RBC 3.94 10*6/mm3      Hemoglobin 10.9 g/dL      Hematocrit 33.9 %      MCV 86.0 fL      MCH 27.7 pg      MCHC 32.2 g/dL      RDW 17.4 %      RDW-SD 54.8 fl      MPV 12.5 fL      Platelets 262 10*3/mm3      Neutrophil % 63.3 %      Lymphocyte % 20.6 %      Monocyte % 8.0 %      Eosinophil % 6.8 %      Basophil % 1.0 %      Immature Grans % 0.3 %      Neutrophils, Absolute 2.53 10*3/mm3      Lymphocytes, Absolute 0.82 10*3/mm3      Monocytes, Absolute 0.32 10*3/mm3      Eosinophils, Absolute 0.27 10*3/mm3      Basophils, Absolute 0.04 10*3/mm3      Immature Grans, Absolute 0.01 10*3/mm3      nRBC 0.0 /100 WBC     POC Glucose Once [[id number]]  (Normal) Collected: 03/02/21 1706    Specimen: Blood Updated: 03/02/21 1735     Glucose 94 mg/dL      Comment: : [id number] Melanie AnnMeter ID: [id number]       TSH [[id number]]  (Normal) Collected: 03/02/21 0130    Specimen: Blood Updated: 03/02/21 0710     TSH 2.440 uIU/mL     T4, Free [[id number]]  (Normal) Collected: 03/02/21 0130    Specimen: Blood Updated: 03/02/21 0710     Free T4 1.45 ng/dL     Narrative:      Results may be falsely increased if patient taking Biotin.      Phosphorus [[id number]]  (Abnormal) Collected: 03/02/21 0130    Specimen: Blood Updated: 03/02/21 0701     Phosphorus 2.0 mg/dL     Amylase [[id number]]  (Abnormal) Collected: 03/02/21 0130    Specimen: Blood Updated: 03/02/21 0701     Amylase 105 U/L     Lipase [[id number]]  (Normal) Collected: 03/02/21 0130    Specimen: Blood Updated: 03/02/21 0659     Lipase 34 U/L     COVID PRE-OP / PRE-PROCEDURE SCREENING ORDER (NO ISOLATION) - Swab, Nasal Cavity [[id number]]  (Normal) Collected: 03/02/21 0451    Specimen:  Swab from Nasal Cavity Updated: 03/02/21 0559    Narrative:      The following orders were created for panel order COVID PRE-OP / PRE-PROCEDURE SCREENING ORDER (NO ISOLATION) - Swab, Nasal Cavity.  Procedure                               Abnormality         Status                     ---------                               -----------         ------                     COVID-19,[facility] [employer] IN-NUZHAT...[[id number]]  Normal              Final result                 Please view results for these tests on the individual orders.    COVID-19,[facility] [employer] IN-HOUSE,Nasal Swab No Transport Media 3-4 HR TAT - Swab, Nasal Cavity [[id number]]  (Normal) Collected: 03/02/21 0451    Specimen: Swab from Nasal Cavity Updated: 03/02/21 0559     COVID19 Not Detected    Narrative:      Fact sheet for providers: https://www.fda.gov/media/179777/download     Fact sheet for patients: https://www.fda.gov/media/970284/download    Test performed by PCR.    Consider negative results in combination with clinical observations, patient history, and epidemiological information.  Fact sheet for providers: https://www.fda.gov/media/367524/download     Fact sheet for patients: https://www.fda.gov/media/935686/download    Test performed by PCR.    Consider negative results in combination with clinical observations, patient history, and epidemiological information.    Urinalysis, Microscopic Only - Urine, Clean Catch [[id number]]  (Abnormal) Collected: 03/02/21 0135    Specimen: Urine, Clean Catch Updated: 03/02/21 0215     RBC, UA 0-2 /HPF      WBC, UA 6-12 /HPF      Bacteria, UA 1+ /HPF      Squamous Epithelial Cells, UA 21-30 /HPF      Yeast, UA       Small/1+ Budding Yeast w/Hyphae     /HPF     Hyaline Casts, UA 0-2 /LPF      Methodology Manual Light Microscopy    Theophylline Level [[id number]]  (Normal) Collected: 03/02/21 0130    Specimen: Blood Updated: 03/02/21 0209     Theophylline Level 13.2 mcg/mL     Troponin [[id number]]  (Abnormal) Collected: 03/02/21  0130    Specimen: Blood Updated: 03/02/21 0208     Troponin T 0.036 ng/mL     Narrative:      Troponin T Reference Range:  <= 0.03 ng/mL-   Negative for AMI  >0.03 ng/mL-     Abnormal for myocardial necrosis.  Clinicians would have to utilize clinical acumen, EKG, Troponin and serial changes to determine if it is an Acute Myocardial Infarction or myocardial injury due to an underlying chronic condition.       Results may be falsely decreased if patient taking Biotin.      Urinalysis With Microscopic If Indicated (No Culture) - Urine, Clean Catch [[id number]]  (Abnormal) Collected: 03/02/21 0135    Specimen: Urine, Clean Catch Updated: 03/02/21 0207     Color, UA Yellow     Appearance, UA Clear     pH, UA 5.5     Specific Gravity, UA 1.020     Glucose, UA Negative     Ketones, UA Negative     Bilirubin, UA Negative     Blood, UA Negative     Protein, UA 30 mg/dL (1+)     Leuk Esterase, UA Negative     Nitrite, UA Negative     Urobilinogen, UA 0.2 E.U./dL    [place] Draw [[id number]] Collected: 03/01/21 2058    Specimen: Blood Updated: 03/01/21 2200    Narrative:      The following orders were created for panel order [place] Draw.  Procedure                               Abnormality         Status                     ---------                               -----------         ------                     Light Blue Top[[id number]]                                   Final result               Green Top (Gel)[[id number]]                                  Final result               Lavender Top[[id number]]                                     Final result               Red Top[[id number]]                                          Final result                 Please view results for these tests on the individual orders.    Light Blue Top [[id number]] Collected: 03/01/21 2059    Specimen: Blood Updated: 03/01/21 2200     Extra Tube hold for add-on     Comment: Auto resulted       Green Top (Gel) [[id number]] Collected: 03/01/21 2058     Specimen: Blood Updated: 03/01/21 2200     Extra Tube Hold for add-ons.     Comment: Auto resulted.       Lavender Top [[id number]] Collected: 03/01/21 2058    Specimen: Blood Updated: 03/01/21 2200     Extra Tube hold for add-on     Comment: Auto resulted       Red Top [[id number]] Collected: 03/01/21 2059    Specimen: Blood Updated: 03/01/21 2200     Extra Tube Hold for add-ons.     Comment: Auto resulted.       Comprehensive Metabolic Panel [[id number]]  (Abnormal) Collected: 03/01/21 2058    Specimen: Blood Updated: 03/01/21 2132     Glucose 119 mg/dL      BUN 55 mg/dL      Creatinine 2.01 mg/dL      Sodium 137 mmol/L      Potassium 5.0 mmol/L      Chloride 102 mmol/L      CO2 23.0 mmol/L      Calcium 10.4 mg/dL      Total Protein 7.5 g/dL      Albumin 4.30 g/dL      ALT (SGPT) 47 U/L      AST (SGOT) 47 U/L      Alkaline Phosphatase 146 U/L      Total Bilirubin 0.2 mg/dL      eGFR Non African Amer 24 mL/min/1.73      Globulin 3.2 gm/dL      A/G Ratio 1.3 g/dL      BUN/Creatinine Ratio 27.4     Anion Gap 12.0 mmol/L     Narrative:      GFR Normal >60  Chronic Kidney Disease <60  Kidney Failure <15      Troponin [[id number]]  (Abnormal) Collected: 03/01/21 2058    Specimen: Blood Updated: 03/01/21 2131     Troponin T 0.048 ng/mL     Narrative:      Troponin T Reference Range:  <= 0.03 ng/mL-   Negative for AMI  >0.03 ng/mL-     Abnormal for myocardial necrosis.  Clinicians would have to utilize clinical acumen, EKG, Troponin and serial changes to determine if it is an Acute Myocardial Infarction or myocardial injury due to an underlying chronic condition.       Results may be falsely decreased if patient taking Biotin.      Magnesium [[id number]]  (Normal) Collected: 03/01/21 2058    Specimen: Blood Updated: 03/01/21 2126     Magnesium 2.4 mg/dL     POC Glucose Once [[id number]]  (Normal) Collected: 03/01/21 2057    Specimen: Blood Updated: 03/01/21 2109     Glucose 110 mg/dL      Comment: : [id number] Felicia  Gerard ID: [id number]       CBC & Differential [[id number]]  (Abnormal) Collected: 03/01/21 2058    Specimen: Blood Updated: 03/01/21 2107    Narrative:      The following orders were created for panel order CBC & Differential.  Procedure                               Abnormality         Status                     ---------                               -----------         ------                     CBC Auto Differential[[id number]]        Abnormal            Final result                 Please view results for these tests on the individual orders.    CBC Auto Differential [[id number]]  (Abnormal) Collected: 03/01/21 2058    Specimen: Blood Updated: 03/01/21 2107     WBC 6.10 10*3/mm3      RBC 4.31 10*6/mm3      Hemoglobin 11.7 g/dL      Hematocrit 36.6 %      MCV 84.9 fL      MCH 27.1 pg      MCHC 32.0 g/dL      RDW 17.4 %      RDW-SD 53.6 fl      MPV 12.4 fL      Platelets 275 10*3/mm3      Neutrophil % 66.7 %      Lymphocyte % 20.7 %      Monocyte % 9.0 %      Eosinophil % 2.5 %      Basophil % 0.8 %      Immature Grans % 0.3 %      Neutrophils, Absolute 4.07 10*3/mm3      Lymphocytes, Absolute 1.26 10*3/mm3      Monocytes, Absolute 0.55 10*3/mm3      Eosinophils, Absolute 0.15 10*3/mm3      Basophils, Absolute 0.05 10*3/mm3      Immature Grans, Absolute 0.02 10*3/mm3      nRBC 0.0 /100 WBC           Chief Complaint on Day of Discharge: Feels much better; eager and ready to go home    Hospital Course:  The patient is a 80 y.o. female who presented to [facility] with symptoms of nausea, vomiting, weight loss, in addition to intractable pain primarily involving the proximal aspect of her right lower extremity.  Patient states that for the past year she has been having struggles with intermittent vomiting.  The symptoms seem to have accelerated over the course of the past month, and estimated that she had lost about 20 pounds as a result of the symptoms over the past month.  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  She was transferred to our facility for further work-up and management and additional imaging studies showed some calcifications involving the SMA, and no evidence of an incarcerated or strangulated ventral hernia.  She was admitted to the hospitalist service for further work-up and management, and during this hospitalization she has been evaluated by vascular surgery, gastroenterology, in addition to neurosurgery.    Dr. Almaguer with vascular surgery noted that she did have some plaque burden however had a patent celiac, SMA, and FIDELIA.  Plans for outpatient follow-up in the vascular surgery clinic in the next 2-3 weeks.  Patient will continue her aspirin in addition to statin.    Gastroenterology also evaluated patient and patient was taken for an EGD which revealed a large hiatal hernia, evidence of gastritis, multiple gastric polyps.  Recommendations were to continue PPI treatment and patient has been transition to Protonix.  She was also found to have evidence of constipation and a bowel regimen was instituted including a tap water enema.  Colonoscopy was offered, but was deferred at this time.  Plan for outpatient follow-up in the GI clinic in 2-3 weeks.  While on treatment with PPI in addition to Carafate patient's GI symptoms seem to have calmed down substantially.  She has been tolerating her diet without problem, including without additional episodes of nausea and vomiting over the past few days.    A lingering complaint that she has had throughout this hospitalization has been pain that she localizes primarily to her right leg.  It sounds like her symptoms begin in the middle portion of her back and radiate down her right leg and terminate before reaching the right knee.  No focal findings noted on neuro evaluation.  She has had multiple imaging studies performed during this  "hospitalization.  She has also been evaluated by neurosurgery.  Complicating matters is that we do have her on a low-dose of Mobic in addition to some Voltaren gel, understanding that with some of the GI difficulties she has been experiencing, including her gastritis, but this is less than ideal.  We are trying to balance adequate pain control with underlying GI symptoms that she is also been experiencing as well.    She will follow up with Dr. Dickson in the neurosurgery clinic in 6 weeks.  She is going to get outpatient physical therapy and Occupational Therapy.  An LSO brace will be provided.  Percocet only to be utilized for severe breakthrough pain.  We will utilize Voltaren gel in addition to a Lidoderm patch, in addition to low-dose of Mobic (7.5 mg) daily.  This regimen seems to have resulted in improvement in her pain symptoms, and our hope is that this trend continues.  She understands the importance of being maintained on a bowel regimen to avoid constipation while on any opiate medication.    GI did report that it was okay to discontinue Bentyl.  I am going to hold her ferrous sulfate and metformin as well particularly given her recent GI complaints.    Close outpatient follow-up with primary care provider, gastroenterology, vascular surgery, and neurosurgery.    Condition on Discharge:  Medically stable    Physical Exam on Discharge:  /67 (BP Location: Left arm, Patient Position: Lying)   Pulse 69   Temp 97.7 °F (36.5 °C)   Resp 18   Ht 157.5 cm (62\")   Wt 58 kg (127 lb 12.8 oz)   SpO2 95%   BMI 23.37 kg/m²   Physical Exam  Vitals reviewed.   Constitutional:       Appearance: She is not toxic-appearing.   HENT:      Head: Normocephalic.      Mouth/Throat:      Pharynx: No oropharyngeal exudate.   Cardiovascular:      Rate and Rhythm: Normal rate.   Pulmonary:      Effort: Pulmonary effort is normal.      Comments: On room air  Abdominal:      Palpations: Abdomen is soft.   Skin:     " General: Skin is warm.   Neurological:      General: No focal deficit present.      Mental Status: She is alert.   Psychiatric:         Mood and Affect: Mood normal.           Discharge Disposition:  Home or Self Care    Discharge Medications:     Discharge Medications      New Medications      Instructions Start Date   amLODIPine 10 MG tablet  Commonly known as: NORVASC   10 mg, Oral, Every 24 Hours Scheduled   Start Date: March 8, 2021     Diclofenac Sodium 1 % gel gel  Commonly known as: VOLTAREN   4 g, Topical, 4 Times Daily      lidocaine 5 %  Commonly known as: LIDODERM   1 patch, Transdermal, Every 24 Hours, Remove & Discard patch within 12 hours or as directed by MD      meloxicam 7.5 MG tablet  Commonly known as: Mobic   7.5 mg, Oral, Daily      pantoprazole 40 MG EC tablet  Commonly known as: PROTONIX   40 mg, Oral, Daily      polyethylene glycol 17 g packet  Commonly known as: MIRALAX   17 g, Oral, Daily   Start Date: March 8, 2021     psyllium 58.12 % packet  Commonly known as: METAMUCIL MULTIHEALTH FIBER   1 packet, Oral, Daily   Start Date: March 8, 2021     sucralfate 1 g tablet  Commonly known as: CARAFATE   1 g, Oral, 4 Times Daily Before Meals & Nightly         Continue These Medications      Instructions Start Date   albuterol 1.25 MG/3ML nebulizer solution  Commonly known as: ACCUNEB   1 ampule, Nebulization, Every 6 Hours PRN      allopurinol 300 MG tablet  Commonly known as: ZYLOPRIM   300 mg, Oral, Daily      ALPRAZolam 0.25 MG tablet  Commonly known as: XANAX   0.25 mg, Oral, Nightly PRN      aspirin 81 MG chewable tablet   81 mg, Oral, Daily      Crestor 40 MG tablet  Generic drug: rosuvastatin   40 mg, Oral, Daily      Esgic -40 MG per capsule  Generic drug: Butalbital-APAP-Caffeine   1 capsule, Oral, Every 4 Hours PRN      indapamide 1.25 MG tablet  Commonly known as: LOZOL   1.25 mg, Oral, Daily      levalbuterol 45 MCG/ACT inhaler  Commonly known as: XOPENEX HFA   2 puffs,  Inhalation, Every 4 Hours PRN      levothyroxine 50 MCG tablet  Commonly known as: SYNTHROID, LEVOTHROID   50 mcg, Oral, Daily      lisinopril 20 MG tablet  Commonly known as: PRINIVIL,ZESTRIL   20 mg, Oral, 2 Times Daily      montelukast 10 MG tablet  Commonly known as: SINGULAIR   10 mg, Oral, Nightly      oxyCODONE-acetaminophen 5-325 MG per tablet  Commonly known as: PERCOCET   1 tablet, Oral, Every 6 Hours PRN      theophylline 600 MG 24 hr tablet  Commonly known as: UNIPHYL   300 mg, Oral, 2 times daily      ZETIA PO   10 mg, Oral, Daily         Stop These Medications    ascorbic acid 500 MG tablet  Commonly known as: VITAMIN C     dicyclomine 10 MG capsule  Commonly known as: BENTYL     doxycycline 100 MG capsule  Commonly known as: VIBRAMYCIN     ferrous sulfate 325 (65 FE) MG tablet     metFORMIN 500 MG tablet  Commonly known as: GLUCOPHAGE     naproxen 500 MG tablet  Commonly known as: NAPROSYN     omeprazole 20 MG capsule  Commonly known as: priLOSEC     promethazine-codeine 6.25-10 MG/5ML syrup  Commonly known as: PHENERGAN with CODEINE            Discharge Diet: as tolerated    Activity at Discharge: as tolerated        Follow-up Appointments:   1 week follow-up with primary care provider Dr. Bautista.  Follow-up in 2-3 weeks with Dr. Almaguer the vascular surgery clinic, in addition to [facility] gastroenterology clinic.  Follow-up in 6 weeks with Dr. Dickson in the neurosurgery clinic    Test Results Pending at Discharge: none    Electronically signed by Juanjose Boland MD, 03/07/21, 13:11 CST.    Time: 35 min

## 2021-03-07 NOTE — THERAPY DISCHARGE NOTE
Patient Name: Rosemary Petersen  : 1940    MRN: [id number]                              Today's Date: 3/7/2021       Admit Date: 3/1/2021    Visit Dx:     ICD-10-CM ICD-9-CM   1. Acute kidney injury (EHSAN) with acute tubular necrosis (ATN) (CMS/HCC)  N17.0 584.5   2. Intractable nausea and vomiting  R11.2 536.2   3. Impaired mobility  Z74.09 799.89     Patient Active Problem List   Diagnosis   • Acute kidney injury (EHSAN) with acute tubular necrosis (ATN) (CMS/HCC)   • Weight loss   • Intractable nausea and vomiting   • Low back pain   • Moderate malnutrition (CMS/HCC)   • Constipation   • Gastritis   • Hiatal hernia   • Ventral hernia   • Hypertension   • Right leg pain   • Acute kidney injury (CMS/HCC)     Past Medical History:   Diagnosis Date   • Asthma    • Back pain    • Back pain    • COPD (chronic obstructive pulmonary disease) (CMS/HCC)    • Diabetes mellitus (CMS/HCC)    • Hypertension    • Spondylolisthesis at L5-S1 level      Past Surgical History:   Procedure Laterality Date   • APPENDECTOMY     • CHOLECYSTECTOMY     • ENDOSCOPY N/A 3/2/2021    Procedure: ESOPHAGOGASTRODUODENOSCOPY WITH ANESTHESIA;  Surgeon: Gallito Dobbs MD;  Location: [facility] ENDOSCOPY;  Service: Gastroenterology;  Laterality: N/A;  preop; nausea  postop  PCP Daquan Bautista    • ENDOSCOPY N/A 3/3/2021    Procedure: ESOPHAGOGASTRODUODENOSCOPY WITH ANESTHESIA;  Surgeon: Gallito Dobbs MD;  Location: [facility] ENDOSCOPY;  Service: Gastroenterology;  Laterality: N/A;  pre: weight loss  post: gastric polyps, gastritis, GERD  Daquan Bautista MD   • HYSTERECTOMY       General Information     Row Name 21 1025          Physical Therapy Time and Intention    Document Type  evaluation  -     Mode of Treatment  physical therapy  -     Row Name 21 1025          General Information    Patient Profile Reviewed  yes  -LH     Prior Level of Function  independent:;community mobility;ADL's;shopping;driving;cleaning;cooking;home  management rollator, cane, shower chair, grab bars  -     Existing Precautions/Restrictions  no known precautions/restrictions  -     Barriers to Rehab  none identified  -[facility] Name 03/07/21 1025          Living Environment    Lives With  alone walk in shower  -[facility] Name 03/07/21 1025          Home Main Entrance    Number of Stairs, Main Entrance  none  -[facility] Name 03/07/21 1025          Stairs Within Home, Primary    Number of Stairs, Within Home, Primary  none  -[facility] Name 03/07/21 1025          Cognition    Orientation Status (Cognition)  oriented x 4  -[facility] Name 03/07/21 1025          Safety Issues, Functional Mobility    Safety Issues Affecting Function (Mobility)  ability to follow commands  -     Impairments Affecting Function (Mobility)  endurance/activity tolerance  -       User Key  (r) = Recorded By, (t) = Taken By, (c) = Cosigned By    Initials Name Provider Type     Gonzalo Galicia, PT Physical Therapist        Mobility     [facility] Name 03/07/21 1025          Bed Mobility    Bed Mobility  bed mobility (all) activities  -     All Activities, Hardy (Bed Mobility)  independent  -[facility] Name 03/07/21 1025          Sit-Stand Transfer    Sit-Stand Hardy (Transfers)  standby assist  -[facility] Name 03/07/21 1025          Gait/Stairs (Locomotion)    Hardy Level (Gait)  standby assist  -     Assistive Device (Gait)  cane, straight  -     Distance in Feet (Gait)  200ft  -     Bilateral Gait Deviations  forward flexed posture  -       User Key  (r) = Recorded By, (t) = Taken By, (c) = Cosigned By    Initials Name Provider Type     Gonzalo Galicia, PT Physical Therapist        Obj/Interventions     [facility] Name 03/07/21 1025          Range of Motion Comprehensive    General Range of Motion  no range of motion deficits identified  -[facility] Name 03/07/21 1025          Strength Comprehensive (MMT)    General Manual Muscle Testing (MMT) Assessment  no  "strength deficits identified  -[facility] Name 03/07/21 1025          Motor Skills    Motor Skills  functional endurance  -     Functional Endurance  decreased  -[facility] Name 03/07/21 1025          Balance    Balance Assessment  sitting static balance  -     Static Sitting Balance  WNL  -[facility] Name 03/07/21 1025          Sensory Assessment (Somatosensory)    Sensory Assessment (Somatosensory)  sensation intact  -       User Key  (r) = Recorded By, (t) = Taken By, (c) = Cosigned By    Initials Name Provider Type     Gonzalo Galicia, PT Physical Therapist        Goals/Plan    No documentation.       Clinical Impression     [facility] Name 03/07/21 1025          Pain    Additional Documentation  Pain Scale: Numbers Pre/Post-Treatment (Group)  -[facility] Name 03/07/21 1025          Pain Scale: Numbers Pre/Post-Treatment    Pain Location - Side  Right  -     Pain Location - Orientation  lower  -     Pain Location  extremity RLE lateral thigh \"just hurts\"  -     Pain Intervention(s)  Medication (See MAR);Repositioned;Ambulation/increased activity  -[facility] Name 03/07/21 1025          Plan of Care Review    Plan of Care Reviewed With  patient;daughter  -     Outcome Summary  PT IE complete.  Independent bed mobility and t/f's.  Sb A w/ cane 200ft.  Pt w/ decreased endurance, but safe to ambulate ad reinaldo w/ family/nursing.  May benefit from OP PT.  PT to sign off.  Thank you for referral.  -[facility] Name 03/07/21 1025          Therapy Assessment/Plan (PT)    Patient/Family Therapy Goals Statement (PT)  return home, no pain  -     Criteria for Skilled Interventions Met (PT)  no;skilled treatment is necessary  -[facility] Name 03/07/21 1025          Vital Signs    Intra SpO2 (%)  91  -     O2 Delivery Intra Treatment  room air  -     Intra Patient Position  Sitting  -[facility] Name 03/07/21 1025          Positioning and Restraints    Pre-Treatment Position  in bed  -     Post Treatment Position  " bed  -LH     In Bed  fowlers;call light within reach;encouraged to call for assist;with family/caregiver;side rails up x2;SCD pump applied  -       User Key  (r) = Recorded By, (t) = Taken By, (c) = Cosigned By    Initials Name Provider Type    Gonzalo Lugo, PT Physical Therapist        Outcome Measures     Row Name 03/07/21 1025          How much help from another person do you currently need...    Turning from your back to your side while in flat bed without using bedrails?  4  -LH     Moving from lying on back to sitting on the side of a flat bed without bedrails?  4  -LH     Moving to and from a bed to a chair (including a wheelchair)?  4  -LH     Standing up from a chair using your arms (e.g., wheelchair, bedside chair)?  4  -LH     Climbing 3-5 steps with a railing?  3  -LH     To walk in hospital room?  4  -     AM-PAC 6 Clicks Score (PT)  23  -     Row Name 03/07/21 1025          Functional Assessment    Outcome Measure Options  AM-PAC 6 Clicks Basic Mobility (PT)  -       User Key  (r) = Recorded By, (t) = Taken By, (c) = Cosigned By    Initials Name Provider Type     Gonzalo Galicia, PT Physical Therapist          PT Recommendation and Plan     Plan of Care Reviewed With: patient, daughter  Outcome Summary: PT IE complete.  Independent bed mobility and t/f's.  Sb A w/ cane 200ft.  Pt w/ decreased endurance, but safe to ambulate ad reinaldo w/ family/nursing.  May benefit from OP PT.  PT to sign off.  Thank you for referral.     Time Calculation:   PT Charges     Row Name 03/07/21 1107             Time Calculation    Start Time  1025  -      Stop Time  1105  -      Time Calculation (min)  40 min  -      PT Received On  03/07/21  -        User Key  (r) = Recorded By, (t) = Taken By, (c) = Cosigned By    Initials Name Provider Type    Gonzalo Lugo, PT Physical Therapist        Therapy Charges for Today     Code Description Service Date Service Provider Modifiers Qty    [id number]  PT  EVAL LOW COMPLEXITY 3 3/7/2021 Gonzalo Galicia, PT GP 1          PT G-Codes  Outcome Measure Options: AM-PAC 6 Clicks Basic Mobility (PT)  AM-PAC 6 Clicks Score (PT): 23    PT Discharge Summary  Anticipated Discharge Disposition (PT): home with outpatient therapy services    Gonzalo Galicia, PT  3/7/2021

## 2021-03-07 NOTE — PLAN OF CARE
Problem: Adult Inpatient Plan of Care  Goal: Plan of Care Review  Recent Flowsheet Documentation  Taken 3/7/2021 1025 by Gonzalo Galicia, PT  Plan of Care Reviewed With:   patient   daughter  Outcome Summary: PT IE complete.  Independent bed mobility and t/f's.  Sb A w/ cane 200ft.  Pt w/ decreased endurance, but safe to ambulate ad reinaldo w/ family/nursing.  May benefit from OP PT.  PT to sign off.  Thank you for referral.   Goal Outcome Evaluation:  Plan of Care Reviewed With: patient, daughter     Outcome Summary: PT IE complete.  Independent bed mobility and t/f's.  Sb A w/ cane 200ft.  Pt w/ decreased endurance, but safe to ambulate ad reinaldo w/ family/nursing.  May benefit from OP PT.  PT to sign off.  Thank you for referral.

## 2021-03-07 NOTE — PLAN OF CARE
Problem: Adult Inpatient Plan of Care  Goal: Plan of Care Review  Outcome: Ongoing, Progressing  Flowsheets (Taken 3/7/2021 0129)  Progress: no change  Plan of Care Reviewed With: patient  Outcome Summary: Medicated for R hip/lower back pain x1 with good relief. Pt had episode of confusion, see previous note. Pt is currently alert and oriented x4. Up to BSC with assist x1. IVF infusing. VSS. Safety maintained.

## 2021-03-08 NOTE — OUTREACH NOTE
Prep Survey      Responses   [facility] facility patient discharged from?  [facility]   Is LACE score < 7 ?  No   Emergency Room discharge w/ pulse ox?  No   Eligibility  Readm Mgmt   Discharge diagnosis  EHSAN   Does the patient have one of the following disease processes/diagnoses(primary or secondary)?  Other   Does the patient have Home health ordered?  No   Is there a DME ordered?  No   Prep survey completed?  Yes          Monika Lunsford RN

## 2021-03-10 PROBLEM — G45.9 TIA (TRANSIENT ISCHEMIC ATTACK): Status: ACTIVE | Noted: 2021-01-01

## 2021-03-10 PROBLEM — M48.00 SPINAL STENOSIS: Status: ACTIVE | Noted: 2021-01-01

## 2021-03-10 PROBLEM — D64.9 ANEMIA: Status: ACTIVE | Noted: 2021-01-01

## 2021-03-10 PROBLEM — E78.2 MIXED HYPERLIPIDEMIA: Status: ACTIVE | Noted: 2021-01-01

## 2021-03-10 NOTE — PROGRESS NOTES
Discharge Planning Assessment  [facility]     Patient Name: Rosemary Petersen  MRN: [id number]  Today's Date: 3/10/2021    Admit Date: 3/9/2021    Discharge Needs Assessment     Row Name 03/10/21 1023       Living Environment    Lives With  alone  (Pended)     Current Living Arrangements  home/apartment/condo  (Pended)     Primary Care Provided by  self  (Pended)     Provides Primary Care For  no one  (Pended)     Family Caregiver if Needed  child(abe), adult  (Pended)     Family Caregiver Names  Kaylyn  (Pended)     Quality of Family Relationships  helpful;involved;supportive  (Pended)     Able to Return to Prior Arrangements  yes  (Pended)        Transition Planning    Patient/Family Anticipates Transition to  home  (Pended)        Discharge Needs Assessment    Equipment Currently Used at Home  rollator;walker, standard  (Pended)     Concerns to be Addressed  no discharge needs identified  (Pended)     Current Discharge Risk  lives alone  (Pended)         Discharge Plan     Row Name 03/10/21 1024       Plan    Plan  Home  (Pended)     Patient/Family in Agreement with Plan  yes  (Pended)     Plan Comments  Called and spoke with pt's daughter Kaylyn [phone]. Kaylyn states that pt was independent prior to coming in. Pt has PCP/RX coverage. Will follow.  (Pended)         Continued Care and Services - Admitted Since 3/9/2021    Coordination has not been started for this encounter.         Demographic Summary    No documentation.       Functional Status    No documentation.       Psychosocial    No documentation.       Abuse/Neglect    No documentation.       Legal    No documentation.       Substance Abuse    No documentation.       Patient Forms    No documentation.           Justus Oliveira

## 2021-03-10 NOTE — THERAPY DISCHARGE NOTE
Acute Care - Occupational Therapy Discharge  [facility]    Patient Name: Rosemary Petersen  : 1940    MRN: [id number]                              Today's Date: 3/10/2021       Admit Date: 3/9/2021    Visit Dx:     ICD-10-CM ICD-9-CM   1. TIA (transient ischemic attack)  G45.9 435.9     Patient Active Problem List   Diagnosis   • Acute kidney injury (EHSAN) with acute tubular necrosis (ATN) (CMS/HCC)   • Weight loss   • Intractable nausea and vomiting   • Low back pain   • Moderate malnutrition (CMS/HCC)   • Constipation   • Gastritis   • Hiatal hernia   • Ventral hernia   • Hypertension   • Right leg pain   • Acute kidney injury (CMS/HCC)   • TIA (transient ischemic attack)     Past Medical History:   Diagnosis Date   • Asthma    • Back pain    • Back pain    • COPD (chronic obstructive pulmonary disease) (CMS/HCC)    • Diabetes mellitus (CMS/HCC)    • Hypertension    • Spondylolisthesis at L5-S1 level      Past Surgical History:   Procedure Laterality Date   • APPENDECTOMY     • CHOLECYSTECTOMY     • ENDOSCOPY N/A 3/2/2021    Procedure: ESOPHAGOGASTRODUODENOSCOPY WITH ANESTHESIA;  Surgeon: Gallito Dobbs MD;  Location: [facility] ENDOSCOPY;  Service: Gastroenterology;  Laterality: N/A;  preop; nausea  postop  PCP Daquan Bautista    • ENDOSCOPY N/A 3/3/2021    Procedure: ESOPHAGOGASTRODUODENOSCOPY WITH ANESTHESIA;  Surgeon: Gallito Dobbs MD;  Location: [facility] ENDOSCOPY;  Service: Gastroenterology;  Laterality: N/A;  pre: weight loss  post: gastric polyps, gastritis, GERD  Daquan Bautista MD   • HYSTERECTOMY       General Information     Row Name 03/10/21 0826          OT Time and Intention    Document Type  discharge evaluation/summary pt presents with slurred speech and mild L facial droop, mild EHSAN, TIA, possible R lung nodule, recent hospitalization 3/1/21 - 3/7/21.  -CS     Mode of Treatment  occupational therapy  -CS     Row Name 03/10/21 0826          General Information    Patient Profile Reviewed  yes   -CS     Prior Level of Function  independent:;all household mobility;community mobility;ADL's;driving;home management  -CS     Existing Precautions/Restrictions  fall  -CS     Barriers to Rehab  physical barrier pain from chronic lumbar stenosis  -CS     Row Name 03/10/21 0826          Occupational Profile    Environmental Supports and Barriers (Occupational Profile)  walk in shower with showe chair, rollator, hurri-cane, grab bars  -     Row Name 03/10/21 0826          Living Environment    Lives With  alone  -     Row Name 03/10/21 0826          Home Main Entrance    Number of Stairs, Main Entrance  one  -CS     Stair Railings, Main Entrance  none  -CS     Row Name 03/10/21 0826          Stairs Within Home, Primary    Number of Stairs, Within Home, Primary  none  -CS     Row Name 03/10/21 0826          Cognition    Orientation Status (Cognition)  oriented x 4  -CS       User Key  (r) = Recorded By, (t) = Taken By, (c) = Cosigned By    Initials Name Provider Type    CS Paulina Rodas S, OTR/L, CNT Occupational Therapist        Mobility/ADL's     Row Name 03/10/21 0826          Bed Mobility    Comment (Bed Mobility)  deferred, pt up in bathroom upon OT entering and pt left with transport staff to go to MRI  -     Row Name 03/10/21 0826          Transfers    Transfers  sit-stand transfer  -     Sit-Stand Given (Transfers)  standby assist  -     Row Name 03/10/21 0826          Functional Mobility    Functional Mobility- Ind. Level  contact guard assist;supervision required  -     Functional Mobility- Comment  Pt walked down the [place] to transport chair  -     Row Name 03/10/21 0826          Activities of Daily Living    BADL Assessment/Intervention  lower body dressing;toileting  -     Row Name 03/10/21 0826          Lower Body Dressing Assessment/Training    Given Level (Lower Body Dressing)  don;doff;socks;independent  -     Position (Lower Body Dressing)  edge of bed sitting  -      [facility] Name 03/10/21 0826          Toileting Assessment/Training    [place] Level (Toileting)  toileting skills;modified independence  -     Assistive Devices (Toileting)  commode  -       User Key  (r) = Recorded By, (t) = Taken By, (c) = Cosigned By    Initials Name Provider Type    Paulina Beatty OTR/L, DAYSI Occupational Therapist        Obj/Interventions     Row Name 03/10/21 0917          Sensory Assessment (Somatosensory)    Sensory Assessment (Somatosensory)  sensation intact  -CS     Row Name 03/10/21 0917          Vision Assessment/Intervention    Visual Impairment/Limitations  corrective lenses full-time  -CS     Row Name 03/10/21 0917          Range of Motion Comprehensive    General Range of Motion  bilateral upper extremity ROM WNL  -CS     Row Name 03/10/21 0917          Strength Comprehensive (MMT)    Comment, General Manual Muscle Testing (MMT) Assessment  BUE strength: 4+/5  -CS     Row Name 03/10/21 0917          Balance    Balance Assessment  sitting static balance;sitting dynamic balance;standing static balance;standing dynamic balance  -CS     Static Sitting Balance  WNL  -CS     Dynamic Sitting Balance  WNL  -CS     Static Standing Balance  WNL  -CS     Dynamic Standing Balance  WNL  -CS       User Key  (r) = Recorded By, (t) = Taken By, (c) = Cosigned By    Initials Name Provider Type    Paulina Beatty, OTR/L, DAYSI Occupational Therapist        Goals/Plan    No documentation.       Clinical Impression     Row Name 03/10/21 0826          Pain Assessment    Additional Documentation  Pain Scale: Numbers Pre/Post-Treatment (Group)  -CS     Row Name 03/10/21 0826          Pain Scale: Numbers Pre/Post-Treatment    Pretreatment Pain Rating  4/10  -CS     Posttreatment Pain Rating  4/10  -CS     Pain Location - Side  Right  -CS     Pain Location - Orientation  lower  -CS     Pain Location  extremity  -CS     Pain Intervention(s)  Medication (See MAR);Repositioned;Ambulation/increased  activity  -CS     Row Name 03/10/21 0826          Plan of Care Review    Plan of Care Reviewed With  patient;daughter  -CS     Progress  improving  -CS     Outcome Summary  OT evaluation completed.  Pt demo no further need for skilled OT services.  Pt demo no focal strength, sensation or coordination deficits in her BUEs.  Pt completed toileting with mod I, and donning/doffing socks independently sitting EOB.  Pt progressed from CGA to SBA for functional transfers and mobility and demo functional balance for ADLs.  OT will sign off at this time with recommendation for pt to discharge home with assist.  -CS     Row Name 03/10/21 0826          Therapy Assessment/Plan (OT)    Patient/Family Therapy Goal Statement (OT)  to go home  -CS     Criteria for Skilled Therapeutic Interventions Met (OT)  no;no problems identified which require skilled intervention  -CS     Therapy Frequency (OT)  evaluation only  -CS     Row Name 03/10/21 0826          Therapy Plan Review/Discharge Plan (OT)    Anticipated Discharge Disposition (OT)  home with assist  -CS     Row Name 03/10/21 0826          Positioning and Restraints    Pre-Treatment Position  bathroom  -CS     Post Treatment Position  wheelchair  -CS     In Wheelchair  sitting;with other staff with transport staff going for MRI  -CS       User Key  (r) = Recorded By, (t) = Taken By, (c) = Cosigned By    Initials Name Provider Type    CS Paulina Rodas, OTR/L, CNT Occupational Therapist        Outcome Measures     Row Name 03/10/21 0826          How much help from another is currently needed...    Putting on and taking off regular lower body clothing?  4  -CS     Bathing (including washing, rinsing, and drying)  4  -CS     Toileting (which includes using toilet bed pan or urinal)  4  -CS     Putting on and taking off regular upper body clothing  4  -CS     Taking care of personal grooming (such as brushing teeth)  4  -CS     Eating meals  4  -CS     AM-PAC 6 Clicks Score (OT)   24  -     Row Name 03/10/21 0826          Functional Assessment    Outcome Measure Options  AM-PAC 6 Clicks Daily Activity (OT)  -       User Key  (r) = Recorded By, (t) = Taken By, (c) = Cosigned By    Initials Name Provider Type    Paulina Beatty OTR/L, DAYSI Occupational Therapist          OT Recommendation and Plan  Retired Outcome Summary/Treatment Plan (OT)  Anticipated Discharge Disposition (OT): home with assist  Therapy Frequency (OT): evaluation only  Plan of Care Review  Plan of Care Reviewed With: patient, daughter  Progress: improving  Outcome Summary: OT evaluation completed.  Pt demo no further need for skilled OT services.  Pt demo no focal strength, sensation or coordination deficits in her BUEs.  Pt completed toileting with mod I, and donning/doffing socks independently sitting EOB.  Pt progressed from CGA to SBA for functional transfers and mobility and demo functional balance for ADLs.  OT will sign off at this time with recommendation for pt to discharge home with assist.  Plan of Care Reviewed With: patient, daughter  Outcome Summary: OT evaluation completed.  Pt demo no further need for skilled OT services.  Pt demo no focal strength, sensation or coordination deficits in her BUEs.  Pt completed toileting with mod I, and donning/doffing socks independently sitting EOB.  Pt progressed from CGA to SBA for functional transfers and mobility and demo functional balance for ADLs.  OT will sign off at this time with recommendation for pt to discharge home with assist.     Time Calculation:   Time Calculation- OT     Row Name 03/10/21 0921             Time Calculation- OT    OT Start Time  0826  -CS      OT Stop Time  0910  -      OT Time Calculation (min)  44 min  -      OT Received On  03/10/21  -        User Key  (r) = Recorded By, (t) = Taken By, (c) = Cosigned By    Initials Name Provider Type    Paulina Beatty, OTR/L, DAYSI Occupational Therapist        Therapy Charges for Today      Code Description Service Date Service Provider Modifiers Qty    [id number] HC OT EVAL MOD COMPLEXITY 3 3/10/2021 Paulina Rodas, CANDELARIOR/L, DAYSI GO 1               JERRI Branham/L, DAYSI  3/10/2021

## 2021-03-10 NOTE — PLAN OF CARE
Goal Outcome Evaluation:  Plan of Care Reviewed With: patient, daughter  Progress: improving  Outcome Summary: PT eval completed.  Pt motivated and cooperative w/ therapy.  Pt reports things have improved since yesterday.  Pt demonstrates weakness R hip w/ hx of recent finding at previous hospital stay w/ severe lumbar spinal stenosis.  Pt performed tfers w/ SBA/CGA and ambulates w/ CGA w/ decrease gait speed, step length, heel strike, decrease foot clearance and noted kyphotic spine.  Reports she has been using a huricane and/or a rollator wx since returning home from the last hospital admission.  Pt will benefit from continued PT services to improve activity tolerance, to assist w/ pain mgmt, LE strengthening, balance, and to improve safety awareness improving I w/ functional mobility reducing fall risk.  Recommend home w/ assist at discharge.  Pt to begin OP therapy for lumbar stenosis when advised by MD.

## 2021-03-10 NOTE — PLAN OF CARE
Goal Outcome Evaluation:  Plan of Care Reviewed With: patient  Progress: no change  Outcome Summary: Pt is A&OX4 throughout this shift, daughter states pt can be confused at times. Voiding, NIH remains a 0 tonight. up x1, bsc. Vitals stable exceot slight hypertension noted. Safety maintained.

## 2021-03-10 NOTE — PLAN OF CARE
Goal Outcome Evaluation:  Plan of Care Reviewed With: patient, daughter  Progress: improving  Outcome Summary: Pt reports decreased appetite and unintentional wt loss. Pt qualifies for moderate malnutrition. Boost Glucose Control BID provided, Greek yogurt added with breakfast daily.Encouraged oral intake. Cont to follow.

## 2021-03-10 NOTE — DISCHARGE SUMMARY
[facility] Medicine Services  DISCHARGE SUMMARY       Date of Admission: 3/9/2021  Date of Discharge:  3/10/2021  Primary Care Physician: Daquan Bautista MD    Presenting Problem/History of Present Illness:  Slurred speech.    Final Discharge Diagnoses:  Active Hospital Problems    Diagnosis    • **TIA (transient ischemic attack)    • Spinal stenosis    • Anemia    • Essential hypertension        Consults: Dr. Harvey with neurology.    Procedures Performed:   Results for orders placed during the hospital encounter of 03/09/21    Adult Transthoracic Echo Complete W/ Cont if Necessary Per Protocol (With Agitated Saline)    Interpretation Summary  · Left ventricular ejection fraction appears to be 61 - 65%. Left ventricular systolic function is normal.  · Normal size and function of the right ventricle.  · No significant valvular pathology.  · Estimated right ventricular systolic pressure from tricuspid regurgitation is mildly elevated (35-45 mmHg).  · Saline test results are negative.    Pertinent Test Results:   Imaging Results (Last 72 Hours)     Procedure Component Value Units Date/Time    US Carotid Bilateral [[id number]] Collected: 03/10/21 1252     Updated: 03/10/21 1256    Narrative:      History: TIA       Impression:      Impression:  1. There is less than 50% stenosis of the right internal carotid artery.  2. There is less than 50% stenosis of the left internal carotid artery.  3. Antegrade flow is demonstrated in bilateral vertebral arteries.     Comments: Bilateral carotid vertebral arterial duplex scan was  performed.     Grayscale imaging shows intimal thickening and calcified elements at the  carotid bifurcation. The right internal carotid artery peak systolic  velocity is 56.9 cm/sec. The end-diastolic velocity is 11.3 cm/sec. The  right ICA/CCA ratio is approximately 1.1 . These findings correlate with  less than 50% stenosis of the right internal carotid  artery.     Grayscale imaging shows intimal thickening and calcified elements at the  carotid bifurcation. The left internal carotid artery peak systolic  velocity is 62.6 cm/sec. The end-diastolic velocity is 13.5 cm/sec. The  left ICA/CCA ratio is approximately 1.0 . These findings correlate with  less than 50% stenosis of the left internal carotid artery.     Antegrade flow is demonstrated in bilateral vertebral arteries.     This report was finalized on 03/10/2021 12:53 by Dr. Perry Almaguer MD.    MRI Brain With & Without Contrast [[id number]] Collected: 03/10/21 1127     Updated: 03/10/21 1134    Narrative:      Indication: TIA, lung nodule        Technique: Multisequence, multiplanar MRI of the brain with and without  contrast. 11 cc MultiHance IV contrast.     Comparison: None     Findings:      No diffusion signal abnormality. No intra-axial or extra-axial  hemorrhage. No intracranial mass lesion or pathologic enhancement. The  ventricles, cortical sulci and basal cisterns are symmetric and age  appropriate. Posterior fossa structures are unremarkable. Pituitary  gland and sella are unremarkable. The major intracranial flow-voids are  preserved. Orbital contents are unremarkable. The paranasal sinuses are  clear. Mastoid air cells are clear. Normal bone marrow signal.        Impression:      Impression:     1. Normal brain MRI with contrast.     This report was finalized on 03/10/2021 11:31 by Dr Ren Dyer, .    CT Head Without Contrast [[id number]] Collected: 03/10/21 0705     Updated: 03/10/21 0710    Narrative:      Exam: CT HEAD WO CONTRAST-     Indication: Transient ischemic attack (TIA)     Comparison: None     Dose length product: 540 mGy cm. Automated exposure control was also  utilized to decrease patient radiation dose.     Findings:     No acute intracranial hemorrhage. No loss of gray-white differentiation.  Chronic microvascular ischemic change and global cerebral volume loss.  No midline  shift or mass effect. Lateral ventricles are nondilated. No  acute orbital finding. Mastoid air cells and paranasal sinuses are  clear. No acute osseous finding.       Impression:      Impression:     1.  No acute intracranial findings.  2.  Global cerebral volume loss and presumed chronic microvascular  ischemic white matter change.     These findings are in agreement with the critical and emergent findings  from the [facility] preliminary report.  This report was finalized on 03/10/2021 07:07 by Dr. aMgdi Estrella MD.    XR Chest 1 View [[id number]] Collected: 03/10/21 0659     Updated: 03/10/21 0705    Narrative:      EXAMINATION: XR CHEST 1 VW-  3/10/2021 6:59 AM CST 1 view     HISTORY: weakness     COMPARISON: 03/01/2021.     FINDINGS:   Atherosclerosis in the aorta with tortuous thoracic aorta. The heart is  not enlarged. No definite airspace consolidation is identified. No  evidence of pneumothorax. Questionable pulmonary nodule in the RIGHT  lung apex measuring up to 1.7 cm     The osseous structures and surrounding soft tissues demonstrate no acute  abnormality.          Impression:         1.  No acute findings.     2.  Possible 1.7 cm nodule in the RIGHT lung apex. Follow-up CT chest  recommended.     3.  This patient was placed in the new lung findings folder to ensure  follow-up.        This report was finalized on 03/10/2021 07:02 by Dr. Josue Peres MD.        Lab Results (last 72 hours)     Procedure Component Value Units Date/Time    Magnesium [[id number]]  (Normal) Collected: 03/10/21 0635    Specimen: Blood Updated: 03/10/21 0841     Magnesium 1.8 mg/dL     Vitamin B12 [[id number]] Collected: 03/09/21 2224    Specimen: Blood from Arm, Right Updated: 03/10/21 0836    Lipid Panel [[id number]] Collected: 03/10/21 0635    Specimen: Blood Updated: 03/10/21 0718     Total Cholesterol 116 mg/dL      Triglycerides 118 mg/dL      HDL Cholesterol 50 mg/dL      LDL Cholesterol  45 mg/dL      VLDL Cholesterol 21  mg/dL      LDL/HDL Ratio 0.85    Narrative:      Cholesterol Reference Ranges  (U.S. Department of Health and Human Services ATP III Classifications)    Desirable          <200 mg/dL  Borderline High    200-239 mg/dL  High Risk          >240 mg/dL      Triglyceride Reference Ranges  (U.S. Department of Health and Human Services ATP III Classifications)    Normal           <150 mg/dL  Borderline High  150-199 mg/dL  High             200-499 mg/dL  Very High        >500 mg/dL    HDL Reference Ranges  (U.S. Department of Health and Human Services ATP III Classifcations)    Low     <40 mg/dl (major risk factor for CHD)  High    >60 mg/dl ('negative' risk factor for CHD)        LDL Reference Ranges  (U.S. Department of Health and Human Services ATP III Classifcations)    Optimal          <100 mg/dL  Near Optimal     100-129 mg/dL  Borderline High  130-159 mg/dL  High             160-189 mg/dL  Very High        >189 mg/dL    Hemoglobin A1c [[id number]]  (Normal) Collected: 03/10/21 0635    Specimen: Blood Updated: 03/10/21 0701     Hemoglobin A1C 5.60 %     Narrative:      Hemoglobin A1C Ranges:    Increased Risk for Diabetes  5.7% to 6.4%  Diabetes                     >= 6.5%  Diabetic Goal                < 7.0%    POC Glucose Once [[id number]]  (Normal) Collected: 03/10/21 0333    Specimen: Blood Updated: 03/10/21 0353     Glucose 111 mg/dL      Comment: : [id number] Ryanly NicoleMeter ID: [id number]       COVID PRE-OP / PRE-PROCEDURE SCREENING ORDER (NO ISOLATION) - Swab, Nasal Cavity [[id number]]  (Normal) Collected: 03/09/21 2313    Specimen: Swab from Nasal Cavity Updated: 03/09/21 5233    Narrative:      The following orders were created for panel order COVID PRE-OP / PRE-PROCEDURE SCREENING ORDER (NO ISOLATION) - Swab, Nasal Cavity.  Procedure                               Abnormality         Status                     ---------                               -----------         ------                        COVID-19,[facility] [employer] IN-NUZHAT...[[id number]]  Normal              Final result                 Please view results for these tests on the individual orders.    COVID-19,[facility] [employer] IN-HOUSE,Nasal Swab No Transport Media 3-4 HR TAT - Swab, Nasal Cavity [[id number]]  (Normal) Collected: 03/09/21 2313    Specimen: Swab from Nasal Cavity Updated: 03/09/21 2355     COVID19 Not Detected    Narrative:      Fact sheet for providers: https://www.fda.gov/media/913218/download     Fact sheet for patients: https://www.fda.gov/media/808617/download    Test performed by PCR.    Consider negative results in combination with clinical observations, patient history, and epidemiological information.    [place] Draw [[id number]] Collected: 03/09/21 2224    Specimen: Blood from Arm, Right Updated: 03/09/21 2331    Narrative:      The following orders were created for panel order [place] Draw.  Procedure                               Abnormality         Status                     ---------                               -----------         ------                     Light Blue Top[[id number]]                                   Final result               Green Top (Gel)[[id number]]                                  Final result               Lavender Top[[id number]]                                     Final result               Red Top[[id number]]                                          Final result                 Please view results for these tests on the individual orders.    Light Blue Top [[id number]] Collected: 03/09/21 2224    Specimen: Blood from Arm, Right Updated: 03/09/21 2331     Extra Tube hold for add-on     Comment: Auto resulted       Lavender Top [[id number]] Collected: 03/09/21 2224    Specimen: Blood from Arm, Right Updated: 03/09/21 2331     Extra Tube hold for add-on     Comment: Auto resulted       Green Top (Gel) [[id number]] Collected: 03/09/21 2224    Specimen: Blood from Arm, Right Updated: 03/09/21 2331     Extra Tube Hold for  add-ons.     Comment: Auto resulted.       Red Top [[id number]] Collected: 03/09/21 2224    Specimen: Blood from Arm, Right Updated: 03/09/21 2331     Extra Tube Hold for add-ons.     Comment: Auto resulted.       Basic Metabolic Panel [[id number]]  (Abnormal) Collected: 03/09/21 2224    Specimen: Blood from Arm, Right Updated: 03/09/21 2326     Glucose 115 mg/dL      BUN 27 mg/dL      Creatinine 1.10 mg/dL      Sodium 140 mmol/L      Potassium 3.7 mmol/L      Comment: Slight hemolysis detected by analyzer. Results may be affected.        Chloride 101 mmol/L      CO2 29.0 mmol/L      Calcium 9.6 mg/dL      eGFR Non African Amer 48 mL/min/1.73      BUN/Creatinine Ratio 24.5     Anion Gap 10.0 mmol/L     Narrative:      GFR Normal >60  Chronic Kidney Disease <60  Kidney Failure <15      Troponin [[id number]]  (Normal) Collected: 03/09/21 2224    Specimen: Blood from Arm, Right Updated: 03/09/21 2321     Troponin T 0.016 ng/mL     Narrative:      Troponin T Reference Range:  <= 0.03 ng/mL-   Negative for AMI  >0.03 ng/mL-     Abnormal for myocardial necrosis.  Clinicians would have to utilize clinical acumen, EKG, Troponin and serial changes to determine if it is an Acute Myocardial Infarction or myocardial injury due to an underlying chronic condition.       Results may be falsely decreased if patient taking Biotin.      Protime-INR [[id number]]  (Normal) Collected: 03/09/21 2224    Specimen: Blood from Arm, Right Updated: 03/09/21 2316     Protime 12.7 Seconds      INR 0.99    aPTT [[id number]]  (Normal) Collected: 03/09/21 2224    Specimen: Blood from Arm, Right Updated: 03/09/21 2316     PTT 24.7 seconds     CBC & Differential [[id number]]  (Abnormal) Collected: 03/09/21 2224    Specimen: Blood from Arm, Right Updated: 03/09/21 2309    Narrative:      The following orders were created for panel order CBC & Differential.  Procedure                               Abnormality         Status                     ---------                                -----------         ------                     CBC Auto Differential[[id number]]        Abnormal            Final result                 Please view results for these tests on the individual orders.    CBC Auto Differential [[id number]]  (Abnormal) Collected: 03/09/21 2224    Specimen: Blood from Arm, Right Updated: 03/09/21 2309     WBC 5.33 10*3/mm3      RBC 3.84 10*6/mm3      Hemoglobin 10.5 g/dL      Hematocrit 32.8 %      MCV 85.4 fL      MCH 27.3 pg      MCHC 32.0 g/dL      RDW 18.6 %      RDW-SD 57.5 fl      MPV 10.6 fL      Platelets 240 10*3/mm3      Neutrophil % 68.4 %      Lymphocyte % 18.6 %      Monocyte % 8.1 %      Eosinophil % 3.8 %      Basophil % 0.9 %      Immature Grans % 0.2 %      Neutrophils, Absolute 3.65 10*3/mm3      Lymphocytes, Absolute 0.99 10*3/mm3      Monocytes, Absolute 0.43 10*3/mm3      Eosinophils, Absolute 0.20 10*3/mm3      Basophils, Absolute 0.05 10*3/mm3      Immature Grans, Absolute 0.01 10*3/mm3      nRBC 0.0 /100 WBC         Chief Complaint on Day of Discharge: Overall, patient reports feeling well.  She denies shortness of breath, chest pain or pressure.  She is tolerating room air.  She is neurologically back at baseline.  She is eager for discharge home.    Hospital Course:  The patient is a 80 y.o. female who presented to [facility] with slurred speech.  Of note, she was recently hospitalized 3/1 to 3/7/2021 for acute kidney injury and found to have large hiatal hernia, gastric polyps, calcifications involving SMA.  She also had complaints of right lower extremity pain and MRI of the lumbar spine shows severe lumbar spine stenosis.  She was evaluated by neurosurgery with recommendations to continue therapy and follow-up in 6 weeks as outpatient.  On 3/9 patient was outside talking with her neighbors and daughter around 1730, she was noted to be in her usual state of health.  The daughter went home and received a call from the patient  later in the evening around 2030.  Daughter noted that the patient developed slurred speech during the conversation.  Daughter lives about 5 minutes away and when she arrived, speech was slow and noted left lower facial weakness.  Symptoms resolved within an hour.  Patient does note that she has had 2 similar episodes, one a few months ago and the other about 1 to 2 weeks ago.  Both episodes lasted only minutes.  Of note she did take a Percocet on the day of admission and took a Xanax the night before.  Upon evaluation in the emergency department, CT of the head negative for acute intracranial findings.  IV TPA not considered as symptoms completely resolved within the hour of onset.  She was admitted to the hospitalist service for further evaluation and management.    She was seen in consultation by neurology.  Neurology recommends to continue aspirin 81 mg daily and Lipitor 10 mg as LDL is at goal at 44 and also advanced age.  She has been maintaining normal sinus rhythm on telemetry.   Will need 14-day Zio patch at discharge.  MRI of the brain showed normal brain MRI with contrast.  Carotid ultrasound showed less than 50% stenosis of the right and left internal carotid artery.  Antegrade flow demonstrated in bilateral vertebral arteries.  Transthoracic echocardiogram showed preserved ejection fraction.  No significant valvular pathology.  Saline test results are negative.  She is okay for discharge from neurology standpoint.  She is to follow-up with neurology in 3 to 4 weeks.  She was seen by physical, speech, and Occupational Therapy.  She has no therapy needs.  Recommends home with assist.  Blood pressure trend reviewed with borderline high readings.  She takes amlodipine and lisinopril on outpatient basis.  Instructed to check her blood pressure at home and keep a log of readings to bring to follow-up appointment with primary care provider.  Overall, patient reports feeling well.  She denies shortness of  "breath, chest pain or pressure.  She is tolerating room air.   She is neurologically back at baseline.   She does live alone and has all needed DME.  At baseline she gets around with a rolator or cane.  I updated her daughter Susana Nava at bedside on plan of care.  States she is able to stay with daughter at time of discharge.  May need to limit pain medications and xanax.  She is appropriate for discharge home.  She is to follow up with her primary care provider within one week.    Condition on Discharge:  Medically stable.    Physical Exam on Discharge:  /67 (BP Location: Right arm, Patient Position: Lying)   Pulse 67   Temp 98 °F (36.7 °C) (Oral)   Resp 16   Ht 157.5 cm (62.01\")   Wt 58.3 kg (128 lb 9 oz)   SpO2 97%   Breastfeeding No   BMI 23.51 kg/m²   Physical Exam  Constitutional:       Appearance: She is well-developed. She is not ill-appearing.      Comments: Sitting up in bed.  No acute distress.  Tolerating room air.  Discussed with her nurse, sheila.   HENT:      Head: Normocephalic and atraumatic.   Eyes:      General: No scleral icterus.     Conjunctiva/sclera: Conjunctivae normal.      Pupils: Pupils are equal, round, and reactive to light.   Neck:      Vascular: No JVD.   Cardiovascular:      Rate and Rhythm: Normal rate and regular rhythm.      Heart sounds: Normal heart sounds.      Comments: NSR 59-79  Pulmonary:      Effort: Pulmonary effort is normal.      Breath sounds: Normal breath sounds.   Abdominal:      General: Bowel sounds are normal.      Palpations: Abdomen is soft. There is no mass.      Tenderness: There is no abdominal tenderness.   Musculoskeletal:         General: Normal range of motion.      Cervical back: Neck supple.      Right lower leg: No edema.      Left lower leg: No edema.   Lymphadenopathy:      Cervical: No cervical adenopathy.   Skin:     General: Skin is warm and dry.   Neurological:      Mental Status: She is alert and oriented to person, place, and " time.      Cranial Nerves: No cranial nerve deficit.   Psychiatric:         Behavior: Behavior normal.       Discharge Disposition:  Home    Discharge Medications:     Discharge Medications      New Medications      Instructions Start Date   atorvastatin 10 MG tablet  Commonly known as: LIPITOR   10 mg, Oral, Nightly         Continue These Medications      Instructions Start Date   albuterol 1.25 MG/3ML nebulizer solution  Commonly known as: ACCUNEB   1 ampule, Nebulization, Every 6 Hours PRN      allopurinol 300 MG tablet  Commonly known as: ZYLOPRIM   300 mg, Oral, Daily      ALPRAZolam 0.25 MG tablet  Commonly known as: XANAX   0.25 mg, Oral, Nightly PRN      amLODIPine 10 MG tablet  Commonly known as: NORVASC   10 mg, Oral, Every 24 Hours Scheduled      aspirin 81 MG chewable tablet   81 mg, Oral, Daily      Diclofenac Sodium 1 % gel gel  Commonly known as: VOLTAREN   4 g, Topical, 4 Times Daily      Esgic -40 MG per capsule  Generic drug: Butalbital-APAP-Caffeine   1 capsule, Oral, Every 4 Hours PRN      indapamide 1.25 MG tablet  Commonly known as: LOZOL   1.25 mg, Oral, Daily      levalbuterol 45 MCG/ACT inhaler  Commonly known as: XOPENEX HFA   2 puffs, Inhalation, Every 4 Hours PRN      levothyroxine 50 MCG tablet  Commonly known as: SYNTHROID, LEVOTHROID   50 mcg, Oral, Daily      lidocaine 5 %  Commonly known as: LIDODERM   1 patch, Transdermal, Every 24 Hours, Remove & Discard patch within 12 hours or as directed by MD      lisinopril 20 MG tablet  Commonly known as: PRINIVIL,ZESTRIL   20 mg, Oral, 2 Times Daily      meloxicam 7.5 MG tablet  Commonly known as: Mobic   7.5 mg, Oral, Daily      montelukast 10 MG tablet  Commonly known as: SINGULAIR   10 mg, Oral, Nightly      oxyCODONE-acetaminophen 5-325 MG per tablet  Commonly known as: PERCOCET   1 tablet, Oral, Every 6 Hours PRN      pantoprazole 40 MG EC tablet  Commonly known as: PROTONIX   40 mg, Oral, Daily      polyethylene glycol 17 g  packet  Commonly known as: MIRALAX   17 g, Oral, Daily      psyllium 58.12 % packet  Commonly known as: METAMUCIL MULTIHEALTH FIBER   1 packet, Oral, Daily      sucralfate 1 g tablet  Commonly known as: CARAFATE   1 g, Oral, 4 Times Daily Before Meals & Nightly      theophylline 600 MG 24 hr tablet  Commonly known as: UNIPHYL   300 mg, Oral, 2 times daily      ZETIA PO   10 mg, Oral, Daily         Stop These Medications    Crestor 40 MG tablet  Generic drug: rosuvastatin            Discharge Diet:   Diet Instructions     Diet: Regular, Cardiac, Consistent Carbohydrate      Discharge Diet:  Regular  Cardiac  Consistent Carbohydrate             Activity at Discharge:   Activity Instructions     Activity as Tolerated            Discharge Care Plan/Instructions:   1.  Follow up with her primary care provider within one week.  2.  Follow-up with neurology in 3 to 4 weeks. Aspirin 81 mg daily and lipitor 10 mg daily.  3.   Check her blood pressure at home and keep a log of readings to bring to follow-up appointment with primary care provider.    Test Results Pending at Discharge: None.    Electronically signed by LUI Ku, 03/10/21, 14:02 CST.    Time: 35 minutes.    Discussed with LUI Medrano and agree with the assessment, treatment plan, and disposition of the patient as recorded by her.    Discussed with her nurse, Kari.    MRI negative.  No further symptoms.  Further work-up negative.  Continue aspirin Lipitor.  Follow with primary care He.  Neurology to reevaluate in 3 weeks.  Okay for discharge per neurology.    Electronically signed by Paddy Sharpe DO, 3/10/2021, 16:00 CST.

## 2021-03-10 NOTE — PLAN OF CARE
Goal Outcome Evaluation:  Plan of Care Reviewed With: patient, daughter  Progress: improving  Outcome Summary: OT evaluation completed.  Pt demo no further need for skilled OT services.  Pt demo no focal strength, sensation or coordination deficits in her BUEs.  Pt completed toileting with mod I, and donning/doffing socks independently sitting EOB.  Pt progressed from CGA to SBA for functional transfers and mobility and demo functional balance for ADLs.  OT will sign off at this time with recommendation for pt to discharge home with assist.

## 2021-03-10 NOTE — THERAPY DISCHARGE NOTE
Acute Care - Physical Therapy Discharge Summary  [facility]       Patient Name: Rosemary Petersen  : 1940  MRN: [id number]    Today's Date: 3/10/2021                 Admit Date: 3/9/2021      PT Recommendation and Plan    Visit Dx:    ICD-10-CM ICD-9-CM   1. TIA (transient ischemic attack)  G45.9 435.9   2. Impaired mobility  Z74.09 799.89           PT Charges     Row Name 03/10/21 1245             Time Calculation    Start Time  0838  -JE      Stop Time  0917  -JE      Time Calculation (min)  39 min  -JE      PT Received On  03/10/21  -JE      PT Goal Re-Cert Due Date  21  -JE        User Key  (r) = Recorded By, (t) = Taken By, (c) = Cosigned By    Initials Name Provider Type    Tiff Rocha, PT Physical Therapist          Rehab Goal Summary     Row Name 03/10/21 1607 03/10/21 0838          Bed Mobility Goal 1 (PT)    Activity/Assistive Device (Bed Mobility Goal 1, PT)  bed mobility activities, all  -NW  bed mobility activities, all  -JE     [place] Level/Cues Needed (Bed Mobility Goal 1, PT)  independent  -NW  independent  -JE     Time Frame (Bed Mobility Goal 1, PT)  long term goal (LTG);10 days  -NW  long term goal (LTG);10 days  -JE     Progress/Outcomes (Bed Mobility Goal 1, PT)  goal not met  -NW  goal ongoing  -JE        Transfer Goal 1 (PT)    Activity/Assistive Device (Transfer Goal 1, PT)  sit-to-stand/stand-to-sit;bed-to-chair/chair-to-bed  -NW  sit-to-stand/stand-to-sit;bed-to-chair/chair-to-bed  -JE     [place] Level/Cues Needed (Transfer Goal 1, PT)  independent  -NW  independent  -JE     Time Frame (Transfer Goal 1, PT)  long term goal (LTG);10 days  -NW  long term goal (LTG);10 days  -JE     Progress/Outcome (Transfer Goal 1, PT)  goal not met  -NW  goal ongoing  -JE        Gait Training Goal 1 (PT)    Activity/Assistive Device (Gait Training Goal 1, PT)  gait (walking locomotion);assistive device use;decrease fall risk;diminish gait deviation;improve balance and  speed;increase endurance/gait distance  -NW  gait (walking locomotion);assistive device use;decrease fall risk;diminish gait deviation;improve balance and speed;increase endurance/gait distance  -JE     [place] Level (Gait Training Goal 1, PT)  standby assist;modified independence  -NW  standby assist;modified independence  -JE     Distance (Gait Training Goal 1, PT)  100 ft  -NW  100 ft  -JE     Time Frame (Gait Training Goal 1, PT)  long term goal (LTG);10 days  -NW  long term goal (LTG);10 days  -JE     Progress/Outcome (Gait Training Goal 1, PT)  goal not met  -NW  goal ongoing  -JE        Stairs Goal 1 (PT)    Activity/Assistive Device (Stairs Goal 1, PT)  ascending stairs;descending stairs;step-to-step;decrease fall risk;improve balance and speed;assistive device use;other (see comments) [place]  -NW  ascending stairs;descending stairs;step-to-step;decrease fall risk;improve balance and speed;assistive device use;other (see comments) hur[place]ane  -JE     [place] Level/Cues Needed (Stairs Goal 1, PT)  contact guard assist  -NW  contact guard assist  -JE     Number of Stairs (Stairs Goal 1, PT)  1-2  -NW  1-2  -JE     Time Frame (Stairs Goal 1, PT)  long term goal (LTG);10 days  -NW  long term goal (LTG);10 days  -JE     Progress/Outcome (Stairs Goal 1, PT)  goal not met  -NW  goal ongoing  -JE        Patient Education Goal (PT)    Activity (Patient Education Goal, PT)  HEP for strengthening; activities to assist w/ pain mgmt  -NW  HEP for strengthening; activities to assist w/ pain mgmt  -JE     [place]/Cues/Accuracy (Memory Goal 2, PT)  demonstrates adequately;independent;verbalizes understanding  -NW  demonstrates adequately;independent;verbalizes understanding  -JE     Time Frame (Patient Education Goal, PT)  long term goal (LTG);10 days  -NW  long term goal (LTG);10 days  -JE     Progress/Outcome (Patient Education Goal, PT)  goal not met  -NW  goal ongoing  -JE       User Key  (r) = Recorded  By, (t) = Taken By, (c) = Cosigned By    Initials Name Provider Type Discipline    NW Belkis Davis PTA Physical Therapy Assistant PT    Tiff Rocha, PT Physical Therapist PT              PT Discharge Summary  Anticipated Discharge Disposition (PT): home  Reason for Discharge: Discharge from facility  Outcomes Achieved: Discharge from facility occurred on same date as evluation  Discharge Destination: Home      Belkis Davis PTA   3/10/2021

## 2021-03-10 NOTE — H&P
[facility]  HISTORY AND PHYSICAL    Date of Admission: 3/9/2021  Primary Care Physician: Daquan Bautista MD    Subjective     Chief Complaint: Slurred speech     History of Present Illness  80-year-old female who presents to the emergency department for an episode of slurred speech.  Her daughter is present, and assist with giving history.  The patient appears to be back at baseline.  The daughter states that she saw her mom around 530 speaking with some of her friends outside her home and she was normal.  The daughter went home, and called her mother on the phone.  During that phone conversation the patient developed slurred speech.  The daughter went back over to check on her mother, and she noted that she had a mild facial droop and ongoing slurred speech.  This resolved within an hour.  Time of onset was probably around 20:30.  The patient states she was discharged charged home from the hospital with date of admission on 3/1/2021 a date of discharge 3/7/2021 for acute kidney injury and moderate malnutrition.        Review of Systems   Slurred speech    Otherwise complete ROS reviewed and negative except as mentioned in the HPI.    Past Medical History:   Past Medical History:   Diagnosis Date   • Asthma    • Back pain    • Back pain    • COPD (chronic obstructive pulmonary disease) (CMS/HCC)    • Diabetes mellitus (CMS/HCC)    • Hypertension    • Spondylolisthesis at L5-S1 level      Past Surgical History:  Past Surgical History:   Procedure Laterality Date   • APPENDECTOMY     • CHOLECYSTECTOMY     • ENDOSCOPY N/A 3/2/2021    Procedure: ESOPHAGOGASTRODUODENOSCOPY WITH ANESTHESIA;  Surgeon: Gallito Dobbs MD;  Location: [facility] ENDOSCOPY;  Service: Gastroenterology;  Laterality: N/A;  preop; nausea  postop  PCP Daquan Bautista    • ENDOSCOPY N/A 3/3/2021    Procedure: ESOPHAGOGASTRODUODENOSCOPY WITH ANESTHESIA;  Surgeon: Gallito Dobbs MD;  Location:   [facility] ENDOSCOPY;  Service: Gastroenterology;  Laterality: N/A;  pre: weight loss  post: gastric polyps, gastritis, GERD  Daquan Bautista MD   • HYSTERECTOMY       Social History:  reports that she has quit smoking. She quit after 0.00 years of use. She has never used smokeless tobacco. She reports that she does not drink alcohol and does not use drugs.    Family History: Hypertension    Allergies:  No Known Allergies  Medications:  Prior to Admission medications    Medication Sig Start Date End Date Taking? Authorizing Provider   albuterol (ACCUNEB) 1.25 MG/3ML nebulizer solution Take 1 ampule by nebulization Every 6 (Six) Hours As Needed for Wheezing.    Rhianna Worley MD   allopurinol (ZYLOPRIM) 300 MG tablet Take 300 mg by mouth Daily.    Rhianna Worley MD   ALPRAZolam (XANAX) 0.25 MG tablet Take 0.25 mg by mouth At Night As Needed for Anxiety or Sleep.    Rhianna Worley MD   amLODIPine (NORVASC) 10 MG tablet Take 1 tablet by mouth Daily. 3/8/21   Juanjose Boland MD   aspirin 81 MG chewable tablet Chew 81 mg Daily.    Rhianna Worley MD   Butalbital-APAP-Caffeine (Esgic) -40 MG per capsule Take 1 capsule by mouth Every 4 (Four) Hours As Needed for Headache.    Rhianna Worley MD   Diclofenac Sodium (VOLTAREN) 1 % gel gel Apply 4 g topically to the appropriate area as directed 4 (Four) Times a Day. 3/7/21   Juanjose Boland MD   Ezetimibe (ZETIA PO) Take 10 mg by mouth Daily.    Rhianna Worley MD   indapamide (LOZOL) 1.25 MG tablet Take 1.25 mg by mouth Daily.    Rhianna Worley MD   levalbuterol (XOPENEX HFA) 45 MCG/ACT inhaler Inhale 2 puffs Every 4 (Four) Hours As Needed for Wheezing.    Rhianna Worley MD   levothyroxine (SYNTHROID, LEVOTHROID) 50 MCG tablet Take 50 mcg by mouth Daily.    Rhianna Worley MD   lidocaine (LIDODERM) 5 % Place 1 patch on the skin as directed by provider Daily. Remove & Discard patch within 12 hours or as  "directed by MD 3/7/21   Juanjose Boland MD   lisinopril (PRINIVIL,ZESTRIL) 20 MG tablet Take 20 mg by mouth 2 (Two) Times a Day.    ProviderRhianna MD   meloxicam (Mobic) 7.5 MG tablet Take 1 tablet by mouth Daily. 3/7/21   Junajose Boland MD   montelukast (SINGULAIR) 10 MG tablet Take 10 mg by mouth Every Night.    Rhianna Worley MD   oxyCODONE-acetaminophen (PERCOCET) 5-325 MG per tablet Take 1 tablet by mouth Every 6 (Six) Hours As Needed for Severe Pain . 3/7/21   Juanjose Boland MD   pantoprazole (PROTONIX) 40 MG EC tablet Take 1 tablet by mouth Daily. 3/7/21   Juanjose Boland MD   polyethylene glycol (polyethylene glycol) 17 g packet Take 17 g by mouth Daily. 3/8/21   Juanjose Boland MD   psyllium (METAMUCIL MULTIHEALTH FIBER) 58.12 % packet Take 1 packet by mouth Daily. 3/8/21   Juanjose Boland MD   rosuvastatin (Crestor) 40 MG tablet Take 40 mg by mouth Daily.    ProviderRhianna MD   sucralfate (CARAFATE) 1 g tablet Take 1 tablet by mouth 4 (Four) Times a Day Before Meals & at Bedtime. 3/7/21   Juanjose Boland MD   theophylline (UNIPHYL) 600 MG 24 hr tablet Take 300 mg by mouth 2 (two) times a day.    ProviderRhianna MD     Objective     Vital Signs: /77   Pulse 73   Temp 98.3 °F (36.8 °C)   Resp 18   Ht 157.5 cm (62\")   Wt 57.6 kg (127 lb)   SpO2 97%   Breastfeeding No   BMI 23.23 kg/m²   Physical Exam  Vitals reviewed.   Constitutional:       Appearance: Normal appearance.   HENT:      Head: Normocephalic and atraumatic.      Nose: Nose normal.   Eyes:      General: No scleral icterus.     Conjunctiva/sclera: Conjunctivae normal.   Cardiovascular:      Rate and Rhythm: Normal rate and regular rhythm.      Heart sounds: Normal heart sounds.   Pulmonary:      Effort: Pulmonary effort is normal.      Breath sounds: Normal breath sounds.   Abdominal:      General: There is no distension.      Palpations: Abdomen is soft.   "   Musculoskeletal:         General: No tenderness.      Cervical back: Normal range of motion and neck supple.      Comments: Mild lower extremity edema, nonpitting.   Skin:     General: Skin is warm and dry.      Comments: Patient has a left pretibial leg lesion   Neurological:      General: No focal deficit present.      Mental Status: She is alert.      Cranial Nerves: No cranial nerve deficit.   Psychiatric:         Mood and Affect: Mood normal.         Behavior: Behavior normal.       Results Reviewed:  Lab Results (last 24 hours)     Procedure Component Value Units Date/Time    COVID PRE-OP / PRE-PROCEDURE SCREENING ORDER (NO ISOLATION) - Swab, Nasal Cavity [[id number]]  (Normal) Collected: 03/09/21 2313    Specimen: Swab from Nasal Cavity Updated: 03/09/21 2355    Narrative:      The following orders were created for panel order COVID PRE-OP / PRE-PROCEDURE SCREENING ORDER (NO ISOLATION) - Swab, Nasal Cavity.  Procedure                               Abnormality         Status                     ---------                               -----------         ------                     COVID-19,[employer] IN-NUZHAT...[[id number]]  Normal              Final result                 Please view results for these tests on the individual orders.    COVID-19,[employer] IN-HOUSE,Nasal Swab No Transport Media 3-4 HR TAT - Swab, Nasal Cavity [[id number]]  (Normal) Collected: 03/09/21 2313    Specimen: Swab from Nasal Cavity Updated: 03/09/21 2355     COVID19 Not Detected    Narrative:      Fact sheet for providers: https://www.fda.gov/media/646825/download     Fact sheet for patients: https://www.fda.gov/media/298737/download    Test performed by PCR.    Consider negative results in combination with clinical observations, patient history, and epidemiological information.    [place] Draw [[id number]] Collected: 03/09/21 2224    Specimen: Blood from Arm, Right Updated: 03/09/21 2331    Narrative:      The following orders were created  for panel order [place] Draw.  Procedure                               Abnormality         Status                     ---------                               -----------         ------                     Light Blue Top[[id number]]                                   Final result               Green Top (Gel)[[id number]]                                  Final result               Lavender Top[[id number]]                                     Final result               Red Top[[id number]]                                          Final result                 Please view results for these tests on the individual orders.    Light Blue Top [[id number]] Collected: 03/09/21 2224    Specimen: Blood from Arm, Right Updated: 03/09/21 2331     Extra Tube hold for add-on     Comment: Auto resulted       Lavender Top [[id number]] Collected: 03/09/21 2224    Specimen: Blood from Arm, Right Updated: 03/09/21 2331     Extra Tube hold for add-on     Comment: Auto resulted       Green Top (Gel) [[id number]] Collected: 03/09/21 2224    Specimen: Blood from Arm, Right Updated: 03/09/21 2331     Extra Tube Hold for add-ons.     Comment: Auto resulted.       Red Top [[id number]] Collected: 03/09/21 2224    Specimen: Blood from Arm, Right Updated: 03/09/21 2331     Extra Tube Hold for add-ons.     Comment: Auto resulted.       Basic Metabolic Panel [[id number]]  (Abnormal) Collected: 03/09/21 2224    Specimen: Blood from Arm, Right Updated: 03/09/21 2326     Glucose 115 mg/dL      BUN 27 mg/dL      Creatinine 1.10 mg/dL      Sodium 140 mmol/L      Potassium 3.7 mmol/L      Comment: Slight hemolysis detected by analyzer. Results may be affected.        Chloride 101 mmol/L      CO2 29.0 mmol/L      Calcium 9.6 mg/dL      eGFR Non African Amer 48 mL/min/1.73      BUN/Creatinine Ratio 24.5     Anion Gap 10.0 mmol/L     Narrative:      GFR Normal >60  Chronic Kidney Disease <60  Kidney Failure <15      Troponin [[id number]]  (Normal) Collected: 03/09/21  2224    Specimen: Blood from Arm, Right Updated: 03/09/21 2321     Troponin T 0.016 ng/mL     Narrative:      Troponin T Reference Range:  <= 0.03 ng/mL-   Negative for AMI  >0.03 ng/mL-     Abnormal for myocardial necrosis.  Clinicians would have to utilize clinical acumen, EKG, Troponin and serial changes to determine if it is an Acute Myocardial Infarction or myocardial injury due to an underlying chronic condition.       Results may be falsely decreased if patient taking Biotin.      Protime-INR [[id number]]  (Normal) Collected: 03/09/21 2224    Specimen: Blood from Arm, Right Updated: 03/09/21 2316     Protime 12.7 Seconds      INR 0.99    aPTT [[id number]]  (Normal) Collected: 03/09/21 2224    Specimen: Blood from Arm, Right Updated: 03/09/21 2316     PTT 24.7 seconds     CBC & Differential [[id number]]  (Abnormal) Collected: 03/09/21 2224    Specimen: Blood from Arm, Right Updated: 03/09/21 2309    Narrative:      The following orders were created for panel order CBC & Differential.  Procedure                               Abnormality         Status                     ---------                               -----------         ------                     CBC Auto Differential[[id number]]        Abnormal            Final result                 Please view results for these tests on the individual orders.    CBC Auto Differential [[id number]]  (Abnormal) Collected: 03/09/21 2224    Specimen: Blood from Arm, Right Updated: 03/09/21 2309     WBC 5.33 10*3/mm3      RBC 3.84 10*6/mm3      Hemoglobin 10.5 g/dL      Hematocrit 32.8 %      MCV 85.4 fL      MCH 27.3 pg      MCHC 32.0 g/dL      RDW 18.6 %      RDW-SD 57.5 fl      MPV 10.6 fL      Platelets 240 10*3/mm3      Neutrophil % 68.4 %      Lymphocyte % 18.6 %      Monocyte % 8.1 %      Eosinophil % 3.8 %      Basophil % 0.9 %      Immature Grans % 0.2 %      Neutrophils, Absolute 3.65 10*3/mm3      Lymphocytes, Absolute 0.99 10*3/mm3      Monocytes, Absolute 0.43  10*3/mm3      Eosinophils, Absolute 0.20 10*3/mm3      Basophils, Absolute 0.05 10*3/mm3      Immature Grans, Absolute 0.01 10*3/mm3      nRBC 0.0 /100 WBC         Imaging Results (Last 24 Hours)     Procedure Component Value Units Date/Time    XR Chest 1 View [[id number]]  (Abnormal) Resulted: 03/10/21 0009     Updated: 03/10/21 0010    CT Head Without Contrast [[id number]] Resulted: 03/09/21 2258     Updated: 03/09/21 2303        I have personally reviewed and interpreted the radiology studies and ECG obtained at time of admission.     Assessment / Plan     Assessment:   Active Hospital Problems    Diagnosis    • TIA (transient ischemic attack)      Impression:  1.  TIA  2.  Hypertension  3.  Nonspecific anemia  4.  Mild EHSAN with discharging creatinine about 5 days ago of 0.82    Plan:   1.  TIA protocol  2.  Neurology consult in the morning  3.  Cardiac echo/MRI brain/carotid ultrasound in the morning  4.  Labs in the morning  5.  Review appropriate home medications       Code Status: Full     I discussed the patient's findings and my recommendations with the patient and daughter at bedside    Estimated length of stay overnight    Patient seen and examined by me on 3/10/2021    Electronically signed by Soniya Saha DO, 03/10/21, 00:45 CST.

## 2021-03-10 NOTE — ED PROVIDER NOTES
Subjective   81 y/o female arrives for evaluation of since resolved slurred speech and left sided facial droop. DA noted she was slurring her speech on the phone around 2030 and when EMS did arrive her symptoms had fully resolved. They feel maybe lasting for an hour. She denied any weakness, falls or ataxia. She does note some numbness to the left side of her face that is also intermittent but this appears to be going on longer than her current symptoms. She denies any history of CVA or TIA. She is not on anticoagulation. She denies falls, trauma, fevers or chills. Of note she was recently admitted here for EHSAN.           Review of Systems   All other systems reviewed and are negative.      Past Medical History:   Diagnosis Date   • Asthma    • Back pain    • Back pain    • COPD (chronic obstructive pulmonary disease) (CMS/HCC)    • Diabetes mellitus (CMS/HCC)    • Hypertension    • Spondylolisthesis at L5-S1 level        No Known Allergies    Past Surgical History:   Procedure Laterality Date   • APPENDECTOMY     • CHOLECYSTECTOMY     • ENDOSCOPY N/A 3/2/2021    Procedure: ESOPHAGOGASTRODUODENOSCOPY WITH ANESTHESIA;  Surgeon: Gallito Dobbs MD;  Location: [facility] ENDOSCOPY;  Service: Gastroenterology;  Laterality: N/A;  preop; nausea  postop  PCP Daquan Bautista    • ENDOSCOPY N/A 3/3/2021    Procedure: ESOPHAGOGASTRODUODENOSCOPY WITH ANESTHESIA;  Surgeon: Galilto Dobbs MD;  Location: [facility] ENDOSCOPY;  Service: Gastroenterology;  Laterality: N/A;  pre: weight loss  post: gastric polyps, gastritis, GERD  Daquan Bautista MD   • HYSTERECTOMY         History reviewed. No pertinent family history.    Social History     Socioeconomic History   • Marital status: Single     Spouse name: Not on file   • Number of children: Not on file   • Years of education: Not on file   • Highest education level: Not on file   Tobacco Use   • Smoking status: Former Smoker     Years: 0.00   • Smokeless tobacco: Never Used   • Tobacco  comment: QUIT 25 YEARS AGO   Substance and Sexual Activity   • Alcohol use: Never   • Drug use: Never   • Sexual activity: Defer           Objective   Physical Exam  Vitals and nursing note reviewed.   Constitutional:       Appearance: Normal appearance. She is normal weight.   HENT:      Head: Normocephalic and atraumatic.      Right Ear: External ear normal.      Left Ear: External ear normal.      Nose: Nose normal.      Mouth/Throat:      Mouth: Mucous membranes are moist.      Pharynx: Oropharynx is clear.   Eyes:      Conjunctiva/sclera: Conjunctivae normal.      Pupils: Pupils are equal, round, and reactive to light.   Cardiovascular:      Rate and Rhythm: Normal rate and regular rhythm.      Pulses: Normal pulses.      Heart sounds: Normal heart sounds.   Pulmonary:      Effort: Pulmonary effort is normal.      Breath sounds: Normal breath sounds.   Abdominal:      General: Abdomen is flat. Bowel sounds are normal.   Musculoskeletal:         General: Normal range of motion.      Cervical back: Normal range of motion and neck supple.   Skin:     General: Skin is warm.      Capillary Refill: Capillary refill takes less than 2 seconds.   Neurological:      General: No focal deficit present.      Mental Status: She is alert and oriented to person, place, and time. Mental status is at baseline.      Cranial Nerves: No cranial nerve deficit.      Sensory: No sensory deficit.      Motor: No weakness.      Coordination: Coordination normal.      Comments: NIH is 0   Psychiatric:         Mood and Affect: Mood normal.         Behavior: Behavior normal.         Thought Content: Thought content normal.         ECG 12 Lead      Date/Time: 3/10/2021 12:08 AM  Performed by: Scooter Carlson MD  Authorized by: Scooter Carlson MD   Interpreted by physician  Rhythm: sinus rhythm  Rate: normal  BPM: 66  QRS axis: normal                   ED Course      I feel she likely had a TIA with her normal CT. Per hospital  policy we will admit for further work up.    XR Chest 1 View   ED Interpretation   Abnormal   At the right heart border ? Opacities?      CT Head Without Contrast    (Results Pending)     Labs Reviewed   BASIC METABOLIC PANEL - Abnormal; Notable for the following components:       Result Value    Glucose 115 (*)     BUN 27 (*)     Creatinine 1.10 (*)     eGFR Non  Amer 48 (*)     All other components within normal limits    Narrative:     GFR Normal >60  Chronic Kidney Disease <60  Kidney Failure <15     CBC WITH AUTO DIFFERENTIAL - Abnormal; Notable for the following components:    Hemoglobin 10.5 (*)     Hematocrit 32.8 (*)     RDW 18.6 (*)     RDW-SD 57.5 (*)     Lymphocyte % 18.6 (*)     All other components within normal limits   COVID-19,[facility] IN-HOUSE,NASAL SWAB NO TRANSPORT MEDIA 2 HR TAT - Normal    Narrative:     Fact sheet for providers: https://www.fda.gov/media/931391/download     Fact sheet for patients: https://www.fda.gov/media/555305/download    Test performed by PCR.    Consider negative results in combination with clinical observations, patient history, and epidemiological information.   PROTIME-INR - Normal   APTT - Normal   TROPONIN (IN-HOUSE) - Normal    Narrative:     Troponin T Reference Range:  <= 0.03 ng/mL-   Negative for AMI  >0.03 ng/mL-     Abnormal for myocardial necrosis.  Clinicians would have to utilize clinical acumen, EKG, Troponin and serial changes to determine if it is an Acute Myocardial Infarction or myocardial injury due to an underlying chronic condition.       Results may be falsely decreased if patient taking Biotin.     COVID PRE-OP / PRE-PROCEDURE SCREENING ORDER (NO ISOLATION)    Narrative:     The following orders were created for panel order COVID PRE-OP / PRE-PROCEDURE SCREENING ORDER (NO ISOLATION) - Swab, Nasal Cavity.  Procedure                               Abnormality         Status                     ---------                                -----------         ------                     COVID-19,[facility] IN-NUZHAT...[[id number]]  Normal              Final result                 Please view results for these tests on the individual orders.   RAINBOW DRAW    Narrative:     The following orders were created for panel order [place] Draw.  Procedure                               Abnormality         Status                     ---------                               -----------         ------                     Light Blue Top[[id number]]                                   Final result               Green Top (Gel)[[id number]]                                  Final result               Lavender Top[[id number]]                                     Final result               Red Top[[id number]]                                          Final result                 Please view results for these tests on the individual orders.   LIGHT BLUE TOP   GREEN TOP   LAVENDER TOP   RED TOP   CBC AND DIFFERENTIAL    Narrative:     The following orders were created for panel order CBC & Differential.  Procedure                               Abnormality         Status                     ---------                               -----------         ------                     CBC Auto Differential[[id number]]        Abnormal            Final result                 Please view results for these tests on the individual orders.                                        NIHSS (NIH Stroke Scale/Score) reviewed and/or performed as part of the patient evaluation and treatment planning process.  The result associated with this review/performance is: 0       MDM    Final diagnoses:   TIA (transient ischemic attack)            Scooter Carlson MD  03/10/21 0008       Scooter Carlson MD  03/10/21 0013

## 2021-03-10 NOTE — THERAPY EVALUATION
Patient Name: Rosemary Petersen  : 1940    MRN: [id number]                              Today's Date: 3/10/2021       Admit Date: 3/9/2021    Visit Dx:     ICD-10-CM ICD-9-CM   1. TIA (transient ischemic attack)  G45.9 435.9   2. Impaired mobility  Z74.09 799.89     Patient Active Problem List   Diagnosis   • Acute kidney injury (EHSAN) with acute tubular necrosis (ATN) (CMS/HCC)   • Weight loss   • Intractable nausea and vomiting   • Low back pain   • Moderate malnutrition (CMS/HCC)   • Constipation   • Gastritis   • Hiatal hernia   • Ventral hernia   • Essential hypertension   • Right leg pain   • Acute kidney injury (CMS/HCC)   • TIA (transient ischemic attack)   • Spinal stenosis   • Anemia   • Mixed hyperlipidemia     Past Medical History:   Diagnosis Date   • Asthma    • Back pain    • Back pain    • COPD (chronic obstructive pulmonary disease) (CMS/HCC)    • Diabetes mellitus (CMS/HCC)    • Hypertension    • Spondylolisthesis at L5-S1 level      Past Surgical History:   Procedure Laterality Date   • APPENDECTOMY     • CHOLECYSTECTOMY     • ENDOSCOPY N/A 3/2/2021    Procedure: ESOPHAGOGASTRODUODENOSCOPY WITH ANESTHESIA;  Surgeon: Gallito Dobbs MD;  Location: [facility] ENDOSCOPY;  Service: Gastroenterology;  Laterality: N/A;  preop; nausea  postop  PCP Daquan Bautista    • ENDOSCOPY N/A 3/3/2021    Procedure: ESOPHAGOGASTRODUODENOSCOPY WITH ANESTHESIA;  Surgeon: Gallito Dobbs MD;  Location: [facility] ENDOSCOPY;  Service: Gastroenterology;  Laterality: N/A;  pre: weight loss  post: gastric polyps, gastritis, GERD  Daquan Bautista MD   • HYSTERECTOMY       General Information     Row Name 03/10/21 0838          Physical Therapy Time and Intention    Document Type  evaluation;other (see comments) see MAR  -DEA     Mode of Treatment  physical therapy  -JE     Row Name 03/10/21 0838          General Information    Patient Profile Reviewed  yes  -JE     Prior Level of Function  independent:;all household  mobility;community mobility;ADL's;driving;home management uses rollator wx and a cane (possibly a quad cane)  -     Existing Precautions/Restrictions  fall  -     Barriers to Rehab  physical barrier pt w/ hx of lumbar stenosis w/ ongoing pain  -     Row Name 03/10/21 0838          Living Environment    Lives With  alone has shower seat, grab bars  -     Row Name 03/10/21 0838          Home Main Entrance    Number of Stairs, Main Entrance  one  -     Stair Railings, Main Entrance  none  -     Row Name 03/10/21 0838          Stairs Within Home, Primary    Number of Stairs, Within Home, Primary  none  -     Row Name 03/10/21 0838          Cognition    Orientation Status (Cognition)  oriented x 4  -     Row Name 03/10/21 0838          Safety Issues, Functional Mobility    Safety Issues Affecting Function (Mobility)  safety precaution awareness  -     Impairments Affecting Function (Mobility)  balance;endurance/activity tolerance;pain;strength  -     Comment, Safety Issues/Impairments (Mobility)  Susana Gonzaleseks - dtr, lives about 5 min from pt  -       User Key  (r) = Recorded By, (t) = Taken By, (c) = Cosigned By    Initials Name Provider Type    Tiff Rocha, PT Physical Therapist        Mobility     Row Name 03/10/21 0838          Bed Mobility    Comment (Bed Mobility)  pt was up in bathroom, returned to sit on edge of bed and then performed ambulation to transport chair to go down for MRI; unable to complete assessment of bed mobility  -     Row Name 03/10/21 0838          Sit-Stand Transfer    Sit-Stand [place] (Transfers)  standby assist  -     Row Name 03/10/21 0838          Gait/Stairs (Locomotion)    Edwards Level (Gait)  contact guard;verbal cues  -     Distance in Feet (Gait)  70 ft  -     Deviations/Abnormal Patterns (Gait)  base of support, narrow;gait speed decreased;stride length decreased  -     Bilateral Gait Deviations  forward flexed posture;heel strike  decreased  -     Comment (Gait/Stairs)  decrease foot clearance; kyphotic spine  -       User Key  (r) = Recorded By, (t) = Taken By, (c) = Cosigned By    Initials Name Provider Type    Tiff Rocha, PT Physical Therapist        Obj/Interventions     Row Name 03/10/21 0838          Range of Motion Comprehensive    Comment, General Range of Motion  AROM all 4 extremities WFLs  -     Row Name 03/10/21 0838          Strength Comprehensive (MMT)    Comment, General Manual Muscle Testing (MMT) Assessment  defer formal assessment of UE strength to OT, however able to move B UEs against gravity w/out difficulty; LEs grossly 4 to 4+/5 except R hip flexor 2/5, L hip flexor 3+/5  -[facility] Name 03/10/21 0838          Motor Skills    Motor Skills  coordination  -     Coordination  fine motor deficit;gross motor deficit;bilateral;upper extremity;lower extremity;finger to nose;heel to shin;WFL;other (see comments) increase difficulty w/ heel to shin on R LE due to increase pain R hip to knee  -[facility] Name 03/10/21 0838          Balance    Balance Assessment  sitting static balance;sitting dynamic balance;standing static balance;standing dynamic balance  -     Static Sitting Balance  WFL  -     Dynamic Sitting Balance  WFL  -     Static Standing Balance  WFL  -     Dynamic Standing Balance  mild impairment;other (see comments) compensated gait deficits w/ decrease step length, foot clearance, heel strike and gait speed  -[facility] Name 03/10/21 0838          Sensory Assessment (Somatosensory)    Sensory Assessment (Somatosensory)  sensation intact  -       User Key  (r) = Recorded By, (t) = Taken By, (c) = Cosigned By    Initials Name Provider Type    Tiff Rocha, PT Physical Therapist        Goals/Plan     Row Name 03/10/21 0838          Bed Mobility Goal 1 (PT)    Activity/Assistive Device (Bed Mobility Goal 1, PT)  bed mobility activities, all  -     [place] Level/Cues Needed (Bed  Mobility Goal 1, PT)  independent  -JE     Time Frame (Bed Mobility Goal 1, PT)  long term goal (LTG);10 days  -JE     Progress/Outcomes (Bed Mobility Goal 1, PT)  goal ongoing  -Intoloop     Row Name 03/10/21 0838          Transfer Goal 1 (PT)    Activity/Assistive Device (Transfer Goal 1, PT)  sit-to-stand/stand-to-sit;bed-to-chair/chair-to-bed  -JE     [place] Level/Cues Needed (Transfer Goal 1, PT)  independent  -JE     Time Frame (Transfer Goal 1, PT)  long term goal (LTG);10 days  -JE     Progress/Outcome (Transfer Goal 1, PT)  goal ongoing  -Intoloop     Row Name 03/10/21 0838          Gait Training Goal 1 (PT)    Activity/Assistive Device (Gait Training Goal 1, PT)  gait (walking locomotion);assistive device use;decrease fall risk;diminish gait deviation;improve balance and speed;increase endurance/gait distance  -JE     [place] Level (Gait Training Goal 1, PT)  standby assist;modified independence  -JE     Distance (Gait Training Goal 1, PT)  100 ft  -JE     Time Frame (Gait Training Goal 1, PT)  long term goal (LTG);10 days  -JE     Progress/Outcome (Gait Training Goal 1, PT)  goal ongoing  -     Row Name 03/10/21 0838          Stairs Goal 1 (PT)    Activity/Assistive Device (Stairs Goal 1, PT)  ascending stairs;descending stairs;step-to-step;decrease fall risk;improve balance and speed;assistive device use;other (see comments) huricane  -JE     [place] Level/Cues Needed (Stairs Goal 1, PT)  contact guard assist  -JE     Number of Stairs (Stairs Goal 1, PT)  1-2  -JE     Time Frame (Stairs Goal 1, PT)  long term goal (LTG);10 days  -JE     Progress/Outcome (Stairs Goal 1, PT)  goal ongoing  -     Row Name 03/10/21 0838          Patient Education Goal (PT)    Activity (Patient Education Goal, PT)  HEP for strengthening; activities to assist w/ pain mgmt  -JE     [place]/Cues/Accuracy (Memory Goal 2, PT)  demonstrates adequately;independent;verbalizes understanding  -JE     Time Frame (Patient  Education Goal, PT)  long term goal (LTG);10 days  -     Progress/Outcome (Patient Education Goal, PT)  goal ongoing  -       User Key  (r) = Recorded By, (t) = Taken By, (c) = Cosigned By    Initials Name Provider Type    Tiff Rocha, PT Physical Therapist        Clinical Impression     Row Name 03/10/21 0838          Pain    Additional Documentation  Pain Scale: Numbers Pre/Post-Treatment (Group)  -     Row Name 03/10/21 0838          Pain Scale: Numbers Pre/Post-Treatment    Pretreatment Pain Rating  4/10  -     Posttreatment Pain Rating  4/10  -     Pain Location - Side  Right  -     Pain Location - Orientation  lower  -     Pain Location  extremity  -     Pre/Posttreatment Pain Comment  from hip to knee  -     Pain Intervention(s)  Medication (See MAR);Repositioned;Ambulation/increased activity;Rest  -     Row Name 03/10/21 0838          Plan of Care Review    Plan of Care Reviewed With  patient;daughter  -     Progress  improving  -     Outcome Summary  PT eval completed.  Pt motivated and cooperative w/ therapy.  Pt reports things have improved since yesterday.  Pt demonstrates weakness R hip w/ hx of recent finding at previous hospital stay w/ severe lumbar spinal stenosis.  Pt performed tfers w/ SBA/CGA and ambulates w/ CGA w/ decrease gait speed, step length, heel strike, decrease foot clearance and noted kyphotic spine.  Reports she has been using a huricane and/or a rollator wx since returning home from the last hospital admission.  Pt will benefit from continued PT services to improve activity tolerance, to assist w/ pain mgmt, LE strengthening, balance, and to improve safety awareness improving I w/ functional mobility reducing fall risk.  Recommend home w/ assist at discharge.  Pt to begin OP therapy for lumbar stenosis when advised by MD.  -     Row Name 03/10/21 0838          Therapy Assessment/Plan (PT)    Patient/Family Therapy Goals Statement (PT)  to return to  prior level of function  -     Rehab Potential (PT)  good, to achieve stated therapy goals  -     Criteria for Skilled Interventions Met (PT)  yes;meets criteria;skilled treatment is necessary  -     Predicted Duration of Therapy Intervention (PT)  until discharge or goals achieved  -     Row Name 03/10/21 0838          Vital Signs    O2 Delivery Pre Treatment  room air  -JE     O2 Delivery Intra Treatment  room air  -JE     O2 Delivery Post Treatment  room air  -JE     Pre Patient Position  -- in bathroom  -JE     Intra Patient Position  Standing  -JE     Post Patient Position  Sitting  -     Row Name 03/10/21 0838          Positioning and Restraints    Pre-Treatment Position  bathroom  -JE     Post Treatment Position  other  -     Other Position  with other staff going for test in transport chair  -       User Key  (r) = Recorded By, (t) = Taken By, (c) = Cosigned By    Initials Name Provider Type    Tiff Rocha, PT Physical Therapist        Outcome Measures     Row Name 03/10/21 0838          How much help from another person do you currently need...    Turning from your back to your side while in flat bed without using bedrails?  4  -JE     Moving from lying on back to sitting on the side of a flat bed without bedrails?  4  -JE     Moving to and from a bed to a chair (including a wheelchair)?  3  -JE     Standing up from a chair using your arms (e.g., wheelchair, bedside chair)?  4  -JE     Climbing 3-5 steps with a railing?  3  -JE     To walk in hospital room?  3  -JE     AM-PAC 6 Clicks Score (PT)  21  -     Row Name 03/10/21 0838          Functional Assessment    Outcome Measure Options  AM-PAC 6 Clicks Basic Mobility (PT)  -       User Key  (r) = Recorded By, (t) = Taken By, (c) = Cosigned By    Initials Name Provider Type    Tiff Rocha, PT Physical Therapist        Physical Therapy Education                 Title: PT OT SLP Therapies (In Progress)     Topic: Physical  Therapy (In Progress)     Point: Mobility training (Done)     Learning Progress Summary           Patient Acceptance, E,TB,D, VU,DU,NR by  at 3/10/2021 1407    Comment: Education re: purpose of PT/importance of activity; education for safety/falls prevention w/ functional mobility   Family Acceptance, E,TB,D, VU,DU,NR by  at 3/10/2021 1407    Comment: Education re: purpose of PT/importance of activity; education for safety/falls prevention w/ functional mobility                   Point: Home exercise program (Not Started)     Learner Progress:  Not documented in this visit.          Point: Precautions (Done)     Learning Progress Summary           Patient Acceptance, E,TB,D, VU,DU,NR by  at 3/10/2021 1407    Comment: Education re: purpose of PT/importance of activity; education for safety/falls prevention w/ functional mobility   Family Acceptance, E,TB,D, VU,DU,NR by  at 3/10/2021 1407    Comment: Education re: purpose of PT/importance of activity; education for safety/falls prevention w/ functional mobility                               User Key     Initials Effective Dates Name Provider Type Discipline     08/02/18 -  Tiff Meadows, PT Physical Therapist PT              PT Recommendation and Plan  Planned Therapy Interventions (PT): balance training, bed mobility training, gait training, home exercise program, patient/family education, stair training, strengthening, transfer training, other (see comments) (safety/falls prevention, activities to assist w/ pain mgmt)  Plan of Care Reviewed With: patient, daughter  Progress: improving  Outcome Summary: PT eval completed.  Pt motivated and cooperative w/ therapy.  Pt reports things have improved since yesterday.  Pt demonstrates weakness R hip w/ hx of recent finding at previous hospital stay w/ severe lumbar spinal stenosis.  Pt performed tfers w/ SBA/CGA and ambulates w/ CGA w/ decrease gait speed, step length, heel strike, decrease foot clearance and  noted kyphotic spine.  Reports she has been using a huricane and/or a rollator wx since returning home from the last hospital admission.  Pt will benefit from continued PT services to improve activity tolerance, to assist w/ pain mgmt, LE strengthening, balance, and to improve safety awareness improving I w/ functional mobility reducing fall risk.  Recommend home w/ assist at discharge.  Pt to begin OP therapy for lumbar stenosis when advised by MD.     Time Calculation:   PT Charges     Row Name 03/10/21 1245             Time Calculation    Start Time  0838  -      Stop Time  0917  -      Time Calculation (min)  39 min  -      PT Received On  03/10/21  -      PT Goal Re-Cert Due Date  03/20/21  -        User Key  (r) = Recorded By, (t) = Taken By, (c) = Cosigned By    Initials Name Provider Type    Tiff Rocha, PT Physical Therapist        Therapy Charges for Today     Code Description Service Date Service Provider Modifiers Qty    09851660464 HC PT EVAL LOW COMPLEXITY 3 3/10/2021 Tiff Meadows, PT GP 1          PT G-Codes  Outcome Measure Options: AM-PAC 6 Clicks Basic Mobility (PT)  AM-PAC 6 Clicks Score (PT): 21  AM-PAC 6 Clicks Score (OT): 24    Tiff Meadows PT  3/10/2021

## 2021-03-10 NOTE — CONSULTS
Neurology Consult Note    Referring Provider: Soniya Saha MD  Reason for Consultation: stroke      History of present illness:    This is a 80 y.o. right handed female patient with PMH of COPD, DM, hypertension, anxiety, constipation, recent hospitalization discharged 3/7/2021 for n/v, E[facility]AN and found to have large hiatal hernia, gastric polyps, calcifications involving the SMA and 20 pound weight loss. Patient also had been having right lower extremity pain and MRI LS showed severe lumbar spinal stenosis and was evaluated by neurosurgery who recommended therapy and follow up with them in 6 weeks. Daughter is at bedside and assists with history.     Yesterday, patient was outside talking with her neighbors and daughter about 1730 and she was in usual state. Her daughter left and received a call from the patient telling her she had problems working her phone and daughter noted slurred speech as well. Daughter lives about 5 minutes away and when she arrived, speech was slow and noted left lower facial weakness. Patient tells me she knew what she wanted to say but could not get the words out. When she did speak they were the correct words. She noted numbness at corner of left side of mouth. Patient was brought to [facility] ED for evaluation. Patient denies dizziness or lightheadedness at that time. Denies HA. She does report she had 2 similar episodes, one a few months ago and the other about 1-2 weeks ago. Both episodes lasted only minutes. Symptoms resolved within an hour. Patient admitted for further work up. She did take a percocet about 1000 that AM and she does take Xanax at HS and unsure if she took the night before. She denies chest pain, palpitations.     She does take ASA 81 mg daily. She also takes Mobic. She denies history of GI bleed or other bleeding issues. Patient received Covid vaccine with 2nd dose 2/14/2021.        CT head was done and negative for acute process.    LDL- 45  A1C- 5.6   TSH- 2.64  Mg+-  1.8  B12 pending      Past Medical History:   Diagnosis Date   • Asthma    • Back pain    • Back pain    • COPD (chronic obstructive pulmonary disease) (CMS/[facility])    • Diabetes mellitus (CMS/[facility])    • Hypertension    • Spondylolisthesis at L5-S1 level        No Known Allergies  No current facility-administered medications on file prior to encounter.     Current Outpatient Medications on File Prior to Encounter   Medication Sig   • albuterol (ACCUNEB) 1.25 MG/3ML nebulizer solution Take 1 ampule by nebulization Every 6 (Six) Hours As Needed for Wheezing.   • allopurinol (ZYLOPRIM) 300 MG tablet Take 300 mg by mouth Daily.   • ALPRAZolam (XANAX) 0.25 MG tablet Take 0.25 mg by mouth At Night As Needed for Anxiety or Sleep.   • amLODIPine (NORVASC) 10 MG tablet Take 1 tablet by mouth Daily.   • aspirin 81 MG chewable tablet Chew 81 mg Daily.   • Butalbital-APAP-Caffeine (Esgic) -40 MG per capsule Take 1 capsule by mouth Every 4 (Four) Hours As Needed for Headache.   • Diclofenac Sodium (VOLTAREN) 1 % gel gel Apply 4 g topically to the appropriate area as directed 4 (Four) Times a Day.   • Ezetimibe (ZETIA PO) Take 10 mg by mouth Daily.   • indapamide (LOZOL) 1.25 MG tablet Take 1.25 mg by mouth Daily.   • levalbuterol (XOPENEX HFA) 45 MCG/ACT inhaler Inhale 2 puffs Every 4 (Four) Hours As Needed for Wheezing.   • levothyroxine (SYNTHROID, LEVOTHROID) 50 MCG tablet Take 50 mcg by mouth Daily.   • lidocaine (LIDODERM) 5 % Place 1 patch on the skin as directed by provider Daily. Remove & Discard patch within 12 hours or as directed by MD   • lisinopril (PRINIVIL,ZESTRIL) 20 MG tablet Take 20 mg by mouth 2 (Two) Times a Day.   • meloxicam (Mobic) 7.5 MG tablet Take 1 tablet by mouth Daily.   • montelukast (SINGULAIR) 10 MG tablet Take 10 mg by mouth Every Night.   • oxyCODONE-acetaminophen (PERCOCET) 5-325 MG per tablet Take 1 tablet by mouth Every 6 (Six) Hours As Needed for Severe Pain .   • pantoprazole (PROTONIX)  40 MG EC tablet Take 1 tablet by mouth Daily.   • polyethylene glycol (polyethylene glycol) 17 g packet Take 17 g by mouth Daily.   • psyllium (METAMUCIL MULTIHEALTH FIBER) 58.12 % packet Take 1 packet by mouth Daily.   • rosuvastatin (Crestor) 40 MG tablet Take 40 mg by mouth Daily.   • sucralfate (CARAFATE) 1 g tablet Take 1 tablet by mouth 4 (Four) Times a Day Before Meals & at Bedtime.   • theophylline (UNIPHYL) 600 MG 24 hr tablet Take 300 mg by mouth 2 (two) times a day.       Current Facility-Administered Medications:   •  allopurinol (ZYLOPRIM) tablet 300 mg, 300 mg, Oral, Daily, Soniya Saha DO, 300 mg at 03/10/21 0845  •  ALPRAZolam (XANAX) tablet 0.25 mg, 0.25 mg, Oral, Nightly PRN, Soniya Saha DO  •  amLODIPine (NORVASC) tablet 10 mg, 10 mg, Oral, Q24H, Soniya Saha DO, 10 mg at 03/10/21 0845  •  aspirin chewable tablet 81 mg, 81 mg, Oral, Daily, Soniya Saha DO, 81 mg at 03/10/21 0845  •  atorvastatin (LIPITOR) tablet 10 mg, 10 mg, Oral, Nightly, Latoya Wyatt, APRN  •  indapamide (LOZOL) tablet 1.25 mg, 1.25 mg, Oral, Daily, Soniya Saha DO, 1.25 mg at 03/10/21 0845  •  levalbuterol (XOPENEX HFA) inhaler 2 puff, 2 puff, Inhalation, Q4H PRN, Soniya Saha DO  •  levothyroxine (SYNTHROID, LEVOTHROID) tablet 50 mcg, 50 mcg, Oral, Q AM, Soniya Saha DO, 50 mcg at 03/10/21 0611  •  lisinopril (PRINIVIL,ZESTRIL) tablet 20 mg, 20 mg, Oral, BID, Soniya Saha DO, 20 mg at 03/10/21 0845  •  montelukast (SINGULAIR) tablet 10 mg, 10 mg, Oral, Nightly, Soniya Saha DO  •  oxyCODONE-acetaminophen (PERCOCET) 5-325 MG per tablet 1 tablet, 1 tablet, Oral, Q6H PRN, Soniya Saha DO, 1 tablet at 03/10/21 0845  •  pantoprazole (PROTONIX) EC tablet 40 mg, 40 mg, Oral, Daily, Soniya Saha DO, 40 mg at 03/10/21 0845  •  sodium chloride 0.9 % flush 10 mL, 10 mL, Intravenous, Q12H, Soniya Saha DO, 10 mL at 03/10/21 0845  •  sodium chloride  0.9 % flush 10 mL, 10 mL, Intravenous, PRN, Soniya Saha DO  •  sucralfate (CARAFATE) tablet 1 g, 1 g, Oral, 4x Daily AC & at Bedtime, Soniya Saha DO, 1 g at 03/10/21 0611  •  theophylline (JOSE-24) 24 hr capsule 600 mg, 600 mg, Oral, Q24H, Soniya Saha DO, 600 mg at 03/10/21 0845  Social History     Socioeconomic History   • Marital status: Single     Spouse name: Not on file   • Number of children: Not on file   • Years of education: Not on file   • Highest education level: Not on file   Tobacco Use   • Smoking status: Former Smoker     Years: 0.00   • Smokeless tobacco: Never Used   • Tobacco comment: QUIT 25 YEARS AGO   Substance and Sexual Activity   • Alcohol use: Never   • Drug use: Never   • Sexual activity: Defer     History reviewed. No pertinent family history.    Review of Systems  A 14 point review of systems was reviewed and was negative except for left lower facial weakness and slurred speech, resolved and right lower extremity weakness.       Vital Signs   Temp:  [98 °F (36.7 °C)-98.3 °F (36.8 °C)] 98 °F (36.7 °C)  Heart Rate:  [66-76] 67  Resp:  [16-18] 16  BP: (146-182)/(65-85) 152/67    Telemetry: SB-S 59-79      General Exam:  Head:  Normal cephalic, atraumatic  HEENT:  Neck supple. Edentulous  Fundoscopic Exam:  No signs of disc edema  CVS:  Regular rate and rhythm.  No murmurs  Carotid Examination:  No bruits  Lungs:  Clear to auscultation  Abdomen:  Non-tender, Non-distended  Extremities:  No signs of peripheral edema  Skin:  No rashes    Neurologic Exam:    Mental Status:    -Awake, Alert, Oriented X 3  -No word finding difficulties  -No aphasia  -No dysarthria  -Follows simple and complex commands    CN II:  Visual fields full.  Pupils equally reactive to light  CN III, IV, VI:  Extraocular Muscles full with no signs of nystagmus  CN V:  Facial sensory is symmetric with no asymmetries.  CN VII:  Facial motor symmetric  CN VIII:  Gross hearing intact bilaterally  CN IX:   Palate elevates symmetrically  CN X:  Palate elevates symmetrically  CN XI:  Shoulder shrug symmetric  CN XII:  Tongue is midline on protrusion    Motor: (strength out of 5:  1= minimal movement, 2 = movement in plane of gravity, 3 = movement against gravity, 4 = movement against some resistance, 5 = full strength)    -Right Upper Ext: Proximal: 5 Distal: 5  -Left Upper Ext: Proximal: 5 Distal: 5    -Right Lower Ext: Proximal: 5- Distal: 5- but is due to pain.  -Left Lower Ext: Proximal: 5 Distal: 5    DTR:  -Right   Bicep: 2+ Tricep: 2+ Brachioradialis: 2+   Patella: 2+ Ankle: 2+ Neg Babinski  -Left   Bicep: 2+ Tricep: 2+ Brachioradialis: 2+   Patella: 2+ Ankle: 2+ Neg Babinski    Sensory:  -Intact to light touch, pinprick, temperature, pain, and proprioception    Coordination:  -Finger to nose intact  -Heel to shin intact bilateral but had difficulty performing on right leg secondary to pain.   -No ataxia    Gait  -not tested for safety reasons.       Results Review:  Lab Results (last 24 hours)     Procedure Component Value Units Date/Time    Magnesium [[id number]]  (Normal) Collected: 03/10/21 0635    Specimen: Blood Updated: 03/10/21 0841     Magnesium 1.8 mg/dL     Vitamin B12 [[id number]] Collected: 03/09/21 2224    Specimen: Blood from Arm, Right Updated: 03/10/21 0836    Lipid Panel [[id number]] Collected: 03/10/21 0635    Specimen: Blood Updated: 03/10/21 0718     Total Cholesterol 116 mg/dL      Triglycerides 118 mg/dL      HDL Cholesterol 50 mg/dL      LDL Cholesterol  45 mg/dL      VLDL Cholesterol 21 mg/dL      LDL/HDL Ratio 0.85    Narrative:      Cholesterol Reference Ranges  (U.S. Department of Health and Human Services ATP III Classifications)    Desirable          <200 mg/dL  Borderline High    200-239 mg/dL  High Risk          >240 mg/dL      Triglyceride Reference Ranges  (U.S. Department of Health and Human Services ATP III Classifications)    Normal           <150 mg/dL  Borderline High  150-199  mg/dL  High             200-499 mg/dL  Very High        >500 mg/dL    HDL Reference Ranges  (U.S. Department of Health and Human Services ATP III Classifcations)    Low     <40 mg/dl (major risk factor for CHD)  High    >60 mg/dl ('negative' risk factor for CHD)        LDL Reference Ranges  (U.S. Department of Health and Human Services ATP III Classifcations)    Optimal          <100 mg/dL  Near Optimal     100-129 mg/dL  Borderline High  130-159 mg/dL  High             160-189 mg/dL  Very High        >189 mg/dL    Hemoglobin A1c [[id number]]  (Normal) Collected: 03/10/21 0635    Specimen: Blood Updated: 03/10/21 0701     Hemoglobin A1C 5.60 %     Narrative:      Hemoglobin A1C Ranges:    Increased Risk for Diabetes  5.7% to 6.4%  Diabetes                     >= 6.5%  Diabetic Goal                < 7.0%    POC Glucose Once [[id number]]  (Normal) Collected: 03/10/21 0333    Specimen: Blood Updated: 03/10/21 0353     Glucose 111 mg/dL      Comment: : [id number] Ricky SealsMeter ID: [id number]       COVID PRE-OP / PRE-PROCEDURE SCREENING ORDER (NO ISOLATION) - Swab, Nasal Cavity [[id number]]  (Normal) Collected: 03/09/21 2313    Specimen: Swab from Nasal Cavity Updated: 03/09/21 2355    Narrative:      The following orders were created for panel order COVID PRE-OP / PRE-PROCEDURE SCREENING ORDER (NO ISOLATION) - Swab, Nasal Cavity.  Procedure                               Abnormality         Status                     ---------                               -----------         ------                     COVID-19,[facility] IN-NUZHAT...[[id number]]  Normal              Final result                 Please view results for these tests on the individual orders.    COVID-19,[facility] IN-HOUSE,Nasal Swab No Transport Media 3-4 HR TAT - Swab, Nasal Cavity [[id number]]  (Normal) Collected: 03/09/21 2313    Specimen: Swab from Nasal Cavity Updated: 03/09/21 2355     COVID19 Not Detected    Narrative:      Fact sheet for providers:  https://www.fda.gov/media/283245/download     Fact sheet for patients: https://www.fda.gov/media/054932/download    Test performed by PCR.    Consider negative results in combination with clinical observations, patient history, and epidemiological information.    [place] Draw [[id number]] Collected: 03/09/21 2224    Specimen: Blood from Arm, Right Updated: 03/09/21 2331    Narrative:      The following orders were created for panel order [place] Draw.  Procedure                               Abnormality         Status                     ---------                               -----------         ------                     Light Blue Top[[id number]]                                   Final result               Green Top (Gel)[[id number]]                                  Final result               Lavender Top[[id number]]                                     Final result               Red Top[[id number]]                                          Final result                 Please view results for these tests on the individual orders.    Light Blue Top [[id number]] Collected: 03/09/21 2224    Specimen: Blood from Arm, Right Updated: 03/09/21 2331     Extra Tube hold for add-on     Comment: Auto resulted       Lavender Top [[id number]] Collected: 03/09/21 2224    Specimen: Blood from Arm, Right Updated: 03/09/21 2331     Extra Tube hold for add-on     Comment: Auto resulted       Green Top (Gel) [[id number]] Collected: 03/09/21 2224    Specimen: Blood from Arm, Right Updated: 03/09/21 2331     Extra Tube Hold for add-ons.     Comment: Auto resulted.       Red Top [[id number]] Collected: 03/09/21 2224    Specimen: Blood from Arm, Right Updated: 03/09/21 2331     Extra Tube Hold for add-ons.     Comment: Auto resulted.       Basic Metabolic Panel [[id number]]  (Abnormal) Collected: 03/09/21 2224    Specimen: Blood from Arm, Right Updated: 03/09/21 2326     Glucose 115 mg/dL      BUN 27 mg/dL      Creatinine 1.10 mg/dL      Sodium  140 mmol/L      Potassium 3.7 mmol/L      Comment: Slight hemolysis detected by analyzer. Results may be affected.        Chloride 101 mmol/L      CO2 29.0 mmol/L      Calcium 9.6 mg/dL      eGFR Non African Amer 48 mL/min/1.73      BUN/Creatinine Ratio 24.5     Anion Gap 10.0 mmol/L     Narrative:      GFR Normal >60  Chronic Kidney Disease <60  Kidney Failure <15      Troponin [[id number]]  (Normal) Collected: 03/09/21 2224    Specimen: Blood from Arm, Right Updated: 03/09/21 2321     Troponin T 0.016 ng/mL     Narrative:      Troponin T Reference Range:  <= 0.03 ng/mL-   Negative for AMI  >0.03 ng/mL-     Abnormal for myocardial necrosis.  Clinicians would have to utilize clinical acumen, EKG, Troponin and serial changes to determine if it is an Acute Myocardial Infarction or myocardial injury due to an underlying chronic condition.       Results may be falsely decreased if patient taking Biotin.      Protime-INR [[id number]]  (Normal) Collected: 03/09/21 2224    Specimen: Blood from Arm, Right Updated: 03/09/21 2316     Protime 12.7 Seconds      INR 0.99    aPTT [[id number]]  (Normal) Collected: 03/09/21 2224    Specimen: Blood from Arm, Right Updated: 03/09/21 2316     PTT 24.7 seconds     CBC & Differential [[id number]]  (Abnormal) Collected: 03/09/21 2224    Specimen: Blood from Arm, Right Updated: 03/09/21 2309    Narrative:      The following orders were created for panel order CBC & Differential.  Procedure                               Abnormality         Status                     ---------                               -----------         ------                     CBC Auto Differential[[id number]]        Abnormal            Final result                 Please view results for these tests on the individual orders.    CBC Auto Differential [[id number]]  (Abnormal) Collected: 03/09/21 2224    Specimen: Blood from Arm, Right Updated: 03/09/21 2309     WBC 5.33 10*3/mm3      RBC 3.84 10*6/mm3      Hemoglobin 10.5  g/dL      Hematocrit 32.8 %      MCV 85.4 fL      MCH 27.3 pg      MCHC 32.0 g/dL      RDW 18.6 %      RDW-SD 57.5 fl      MPV 10.6 fL      Platelets 240 10*3/mm3      Neutrophil % 68.4 %      Lymphocyte % 18.6 %      Monocyte % 8.1 %      Eosinophil % 3.8 %      Basophil % 0.9 %      Immature Grans % 0.2 %      Neutrophils, Absolute 3.65 10*3/mm3      Lymphocytes, Absolute 0.99 10*3/mm3      Monocytes, Absolute 0.43 10*3/mm3      Eosinophils, Absolute 0.20 10*3/mm3      Basophils, Absolute 0.05 10*3/mm3      Immature Grans, Absolute 0.01 10*3/mm3      nRBC 0.0 /100 WBC           .  Imaging Results (Last 24 Hours)     Procedure Component Value Units Date/Time    CT Head Without Contrast [[id number]] Collected: 03/10/21 0705     Updated: 03/10/21 0710    Narrative:      Exam: CT HEAD WO CONTRAST-     Indication: Transient ischemic attack (TIA)     Comparison: None     Dose length product: 540 mGy cm. Automated exposure control was also  utilized to decrease patient radiation dose.     Findings:     No acute intracranial hemorrhage. No loss of gray-white differentiation.  Chronic microvascular ischemic change and global cerebral volume loss.  No midline shift or mass effect. Lateral ventricles are nondilated. No  acute orbital finding. Mastoid air cells and paranasal sinuses are  clear. No acute osseous finding.       Impression:      Impression:     1.  No acute intracranial findings.  2.  Global cerebral volume loss and presumed chronic microvascular  ischemic white matter change.     These findings are in agreement with the critical and emergent findings  from the [facility] preliminary report.  This report was finalized on 03/10/2021 07:07 by Dr. Magdi Estrella MD.    XR Chest 1 View [[id number]] Collected: 03/10/21 0659     Updated: 03/10/21 0705    Narrative:      EXAMINATION: XR CHEST 1 VW-  3/10/2021 6:59 AM CST 1 view     HISTORY: weakness     COMPARISON: 03/01/2021.     FINDINGS:   Atherosclerosis in the aorta  with tortuous thoracic aorta. The heart is  not enlarged. No definite airspace consolidation is identified. No  evidence of pneumothorax. Questionable pulmonary nodule in the RIGHT  lung apex measuring up to 1.7 cm     The osseous structures and surrounding soft tissues demonstrate no acute  abnormality.          Impression:         1.  No acute findings.     2.  Possible 1.7 cm nodule in the RIGHT lung apex. Follow-up CT chest  recommended.     3.  This patient was placed in the new lung findings folder to ensure  follow-up.        This report was finalized on 03/10/2021 07:02 by Dr. Josue Peres MD.            Active Hospital Problems    Diagnosis  POA   • TIA (transient ischemic attack) [G45.9]  Yes     Impression  1. Transient episode of dysarthria, left lower facial weakness and numbness that has completely resolved within the 1 hour of onset and therefore IV TPA not considered. Patient reports 2 similar episodes about 1-2 weeks ago and the other a few months ago.  2. Right lower extremity pain with Lumbar spinal stenosis.  3. Right lung nodule seen on chest xray.   4. Hypertension.      Plan  1. MRI brain with and without  2. Carotid duplex scan  3. Transthoracic echo.  4. Continue ASA 81 mg daily  5. Lipitor 10 mg as LDL is at goal at 44 and also advanced age.  6. Will need 14 day ZIO patch at discharge.  7. SCD for DVT prophylaxis.  8. OT/PT/ST  9. Defer work up for right lung nodule to attending    I discussed the patients findings and my recommendations with patient and family    Latoya Wyatt, APRN  03/10/21  09:04 CST

## 2021-03-10 NOTE — NURSING NOTE
Bedside handoff, NIH and neuro check performed, NIH remained a 0, pt A&OX4, no changes noted with Kari HUERTAS.

## 2021-03-10 NOTE — OUTREACH NOTE
Medical Week 1 Survey      Responses   [facility] patient discharged from?  [place]   Does the patient have one of the following disease processes/diagnoses(primary or secondary)?  Other   Week 1 attempt successful?  No   Unsuccessful attempts  Attempt 1   Revoke  Readmitted          Becka Jacobs RN

## 2021-03-10 NOTE — PLAN OF CARE
Goal Outcome Evaluation:  Plan of Care Reviewed With: patient, daughter  Progress: improving   Stroke education completed with patient, risk factors discussed. Questions answered. Daughter at bedside throughout the day. Medicated x1 for right leg pain. Up with standby assist, daughter assisting patient in room. Vss, on room air. NIH = 0. Tolerating diet. No concerns.

## 2021-03-11 NOTE — OUTREACH NOTE
Prep Survey      Responses   [facility] facility patient discharged from?  [place]   Is LACE score < 7 ?  Yes   Emergency Room discharge w/ pulse ox?  No   Eligibility  Readm Mgmt   Discharge diagnosis  **TIA (transient ischemic attack   Does the patient have one of the following disease processes/diagnoses(primary or secondary)?  Stroke (TIA)   Does the patient have Home health ordered?  No   Is there a DME ordered?  No   Prep survey completed?  Yes          Kristina Kerr RN

## 2021-03-15 NOTE — TELEPHONE ENCOUNTER
Mailed appointment reminder       ----- Message -----  From: Audrey Howe  Sent: 3/12/2021   3:34 PM CST  To: LUI Levin    Patient granddaughter called in regards to Ms Petersen's discharge papers they got from the hospital. She was wanting to make sure they still needed to make a follow up with Dr Almaguer and she was sent here to ER for severe mesenteric artery stenosis but states DR Almaguer told her no that was not the case. Ms Petersen's granddaughter stated they would make the follow up if it is needed but she wasn't sure and wanted me to double check if the appointment was still needed and then give her a call back

## 2021-03-15 NOTE — OUTREACH NOTE
Stroke Week 1 Survey      Responses   [facility] patient discharged from?  [place]   Does the patient have one of the following disease processes/diagnoses(primary or secondary)?  Stroke (TIA)   Week 1 attempt successful?  No   Unsuccessful attempts  Attempt 1          Geneva Wilson RN

## 2021-03-17 NOTE — OUTREACH NOTE
Stroke Week 1 Survey      Responses   [facility] patient discharged from?  [place]   Does the patient have one of the following disease processes/diagnoses(primary or secondary)?  Stroke (TIA)   Week 1 attempt successful?  Yes   Call start time  0833   Call end time  [phone]   Medication alerts for this patient  medication reviewed   Is the patient having any side effects they believe may be caused by any medication additions or changes?  No   Is the patient taking all medications as directed (includes completed medication regime)?  Yes   Comments regarding appointments  will be seeing provider  next week   Does the patient have a primary care provider?   Yes   Does the patient have an appointment with their PCP within 7 days of discharge?  Yes   Has the patient kept scheduled appointments due by today?  N/A   Has home health visited the patient within 72 hours of discharge?  N/A   What DME was ordered?  has a rolling walker and cane   Did the patient receive a copy of their discharge instructions?  Yes   Nursing interventions  Reviewed instructions with patient   What is the patient's perception of their health status since discharge?  Improving [feels she is getting better]   Is the patient able to teach back FAST for Stroke?  Yes [reviewed with the patient ]   Is the patient/caregiver able to teach back the risk factors for a stroke?  High blood pressure-goal below 120/80 [discussed]   Is the patient/caregiver able to teach back signs and symptoms related to disease process for when to call PCP?  Yes   Is the patient/caregiver able to teach back signs and symptoms related to disease process for when to call 911?  Yes   If the patient is a current smoker, are they able to teach back resources for cessation?  Not a smoker   Is the patient/caregiver able to teach back the hierarchy of who to call/visit for symptoms/problems? PCP, Specialist, Home health nurse, Urgent Care, ED, 911  Yes   Week 1 call completed?   Yes   Wrap up additional comments  patient feels she is doing well, going to PT this morning          Daysi Soto RN

## 2021-03-23 NOTE — PROGRESS NOTES
Spoke with patient's daughter regarding recent incidental lung findings on imaging exam.  She verbalized understanding the Radiologist's recommendations for follow-up.  Report routed to PCP.

## 2021-03-24 NOTE — OUTREACH NOTE
Stroke Week 2 Survey      Responses   [facility] patient discharged from?  [place]   Does the patient have one of the following disease processes/diagnoses(primary or secondary)?  Stroke (TIA)   Week 2 attempt successful?  Yes   Call start time  1039   Call end time  1041   Discharge diagnosis  **TIA (transient ischemic attack   Meds reviewed with patient/caregiver?  Yes   Is the patient having any side effects they believe may be caused by any medication additions or changes?  No   Does the patient have all medications ordered at discharge?  Yes   Is the patient taking all medications as directed (includes completed medication regime)?  Yes   Does the patient have a primary care provider?   Yes   Does the patient have an appointment with their PCP within 7 days of discharge?  Yes   Has the patient kept scheduled appointments due by today?  Yes   Psychosocial issues?  No   Does the patient require any assistance with activities of daily living such as eating, bathing, dressing, walking, etc.?  No   Does the patient have any residual symptoms from stroke/TIA?  Yes   Residual symptoms comments  generalized weakness   Does the patient understand the diet ordered at discharge?  Yes   Did the patient receive a copy of their discharge instructions?  Yes   Nursing interventions  Reviewed instructions with patient, Educated on MyChart   What is the patient's perception of their health status since discharge?  Improving   Nursing interventions  Nurse provided patient education   Is the patient able to teach back FAST for Stroke?  Yes   Is the patient/caregiver able to teach back the risk factors for a stroke?  High blood pressure-goal below 120/80, High Cholesterol   Is the patient/caregiver able to teach back signs and symptoms related to disease process for when to call PCP?  Yes   Is the patient/caregiver able to teach back signs and symptoms related to disease process for when to call 911?  Yes   If the patient is  a current smoker, are they able to teach back resources for cessation?  Not a smoker   Is the patient/caregiver able to teach back the hierarchy of who to call/visit for symptoms/problems? PCP, Specialist, Home health nurse, Urgent Care, ED, 911  Yes   Week 2 call completed?  Yes          Elizabeth Lunsford RN

## 2021-03-29 NOTE — PROGRESS NOTES
Rosemary Petersen  1940    3/29/2021  Chief Complaint   Patient presents with   • GI Problem     Discuss recent hospitalization for nausea and weight loss     Subjective   HPI  Rosemary Petersen is a 80 y.o. female who presents with a recent hospitalization on 3/1/2021 with nausea/constipation and wt loss. She had severe pain stabbing lower abdomen. Right hip pain. She had lost 20 lbs in the past month. No appetite. She has had previous imaging to include a CT scan and she had mention of superior mesenteric artery occlusion as well as incarcerated hernia 2/19/21. Dr connors evaluated and did not feel that this was occluded and decided to follow closely. Repeat CT revealed hiatal hernia, large ventral hernia with transverse colon involvement no incarceration. Constipation noted. She had been on Percocet.  She was given a tap water enema that admission with results. She was not a candidate at that time for colonoscopy. May consider as outpatient.     Today she is better. Her bowels are moving no bleeding. No further nausea or vomiting. No wt loss. Overall feeling much better. Her bowels are moving now with the Miralax and fiber regimen.     Workup included Endoscopy on 3/3/21 showing zline irregular, large hiatal hernia, gastritis, and multiple gastric polyps. No clear source for the patient's overall symptoms. Bx revealed chronic gastritis, mild reflux esophagitis, and hyperplastic gastric polyp.   Past Medical History:   Diagnosis Date   • Asthma    • Back pain    • Back pain    • COPD (chronic obstructive pulmonary disease) (CMS/HCC)    • Diabetes mellitus (CMS/HCC)    • Hypertension    • Spondylolisthesis at L5-S1 level      Past Surgical History:   Procedure Laterality Date   • APPENDECTOMY     • CHOLECYSTECTOMY     • COLONOSCOPY  05/30/2007    Extensive diverticular disease left colon, hemorrhoids   • ENDOSCOPY N/A 3/2/2021    Procedure: ESOPHAGOGASTRODUODENOSCOPY WITH ANESTHESIA;  Surgeon: Gallito Dobbs,  MD;  Location: [facility] ENDOSCOPY;  Service: Gastroenterology;  Laterality: N/A;  preop; nausea  postop  PCP Daquan Varela    • ENDOSCOPY N/A 3/3/2021    Chronic gastritis, Mild reflux esophagitis, Hyperplastic gastric polyps   • HYSTERECTOMY         Outpatient Medications Marked as Taking for the 3/29/21 encounter (Office Visit) with Monika Soto APRN   Medication Sig Dispense Refill   • albuterol (ACCUNEB) 1.25 MG/3ML nebulizer solution Take 1 ampule by nebulization Every 6 (Six) Hours As Needed for Wheezing.     • allopurinol (ZYLOPRIM) 300 MG tablet Take 300 mg by mouth Daily.     • ALPRAZolam (XANAX) 0.25 MG tablet Take 0.25 mg by mouth At Night As Needed for Anxiety or Sleep.     • amLODIPine (NORVASC) 10 MG tablet Take 1 tablet by mouth Daily. 30 tablet 2   • aspirin 81 MG chewable tablet Chew 81 mg Daily.     • atorvastatin (LIPITOR) 10 MG tablet Take 1 tablet by mouth Every Night for 30 days. 30 tablet 0   • Diclofenac Sodium (VOLTAREN) 1 % gel gel Apply 4 g topically to the appropriate area as directed 4 (Four) Times a Day. 100 g 1   • Ezetimibe (ZETIA PO) Take 10 mg by mouth Daily.     • indapamide (LOZOL) 1.25 MG tablet Take 1.25 mg by mouth Daily.     • levalbuterol (XOPENEX HFA) 45 MCG/ACT inhaler Inhale 2 puffs Every 4 (Four) Hours As Needed for Wheezing.     • levothyroxine (SYNTHROID, LEVOTHROID) 50 MCG tablet Take 50 mcg by mouth Daily.     • lidocaine (LIDODERM) 5 % Place 1 patch on the skin as directed by provider Daily. Remove & Discard patch within 12 hours or as directed by MD 30 patch 2   • lisinopril (PRINIVIL,ZESTRIL) 20 MG tablet Take 20 mg by mouth 2 (Two) Times a Day.     • meloxicam (Mobic) 7.5 MG tablet Take 1 tablet by mouth Daily. 30 tablet 2   • montelukast (SINGULAIR) 10 MG tablet Take 10 mg by mouth Every Night.     • oxyCODONE-acetaminophen (PERCOCET) 5-325 MG per tablet Take 1 tablet by mouth Every 6 (Six) Hours As Needed for Severe Pain . 12 tablet 0   • pantoprazole  (PROTONIX) 40 MG EC tablet Take 1 tablet by mouth Daily. 30 tablet 2   • polyethylene glycol (polyethylene glycol) 17 g packet Take 17 g by mouth Daily. 30 packet 1   • psyllium (METAMUCIL MULTIHEALTH FIBER) 58.12 % packet Take 1 packet by mouth Daily. 30 each 2   • sucralfate (CARAFATE) 1 g tablet Take 1 tablet by mouth 4 (Four) Times a Day Before Meals & at Bedtime. 90 tablet 2   • theophylline (UNIPHYL) 600 MG 24 hr tablet Take 300 mg by mouth 2 (two) times a day.     • [DISCONTINUED] sucralfate (CARAFATE) 1 g tablet Take 1 tablet by mouth 4 (Four) Times a Day Before Meals & at Bedtime. 90 tablet 2     No Known Allergies  Social History     Socioeconomic History   • Marital status: Single     Spouse name: Not on file   • Number of children: Not on file   • Years of education: Not on file   • Highest education level: Not on file   Tobacco Use   • Smoking status: Former Smoker     Years: 0.00   • Smokeless tobacco: Never Used   • Tobacco comment: QUIT 25 YEARS AGO   Substance and Sexual Activity   • Alcohol use: Never   • Drug use: Never   • Sexual activity: Defer     Family History   Problem Relation Age of Onset   • Colon cancer Neg Hx    • Colon polyps Neg Hx      Health Maintenance   Topic Date Due   • DXA SCAN  Never done   • COVID-19 Vaccine (1) Never done   • TDAP/TD VACCINES (1 - Tdap) Never done   • ZOSTER VACCINE (1 of 2) Never done   • Pneumococcal Vaccine 65+ (1 of 1 - PPSV23) Never done   • INFLUENZA VACCINE  Never done   • ANNUAL WELLNESS VISIT  Never done   • LIPID PANEL  03/10/2022   • MENINGOCOCCAL VACCINE  Aged Out     Review of Systems   Constitutional: Negative for activity change, appetite change, chills, diaphoresis, fatigue, fever and unexpected weight change.   HENT: Negative for ear pain, hearing loss, mouth sores, sore throat, trouble swallowing and voice change.    Eyes: Negative.    Respiratory: Negative for cough, choking, shortness of breath and wheezing.    Cardiovascular: Negative  "for chest pain and palpitations.   Gastrointestinal: Positive for constipation. Negative for abdominal pain, blood in stool, diarrhea, nausea and vomiting.   Endocrine: Negative for cold intolerance and heat intolerance.   Genitourinary: Negative for decreased urine volume, dysuria, frequency, hematuria and urgency.   Musculoskeletal: Negative for back pain, gait problem and myalgias.   Skin: Negative for color change, pallor and rash.   Allergic/Immunologic: Negative for food allergies and immunocompromised state.   Neurological: Negative for dizziness, tremors, seizures, syncope, weakness, light-headedness, numbness and headaches.   Hematological: Negative for adenopathy. Does not bruise/bleed easily.   Psychiatric/Behavioral: Negative for agitation and confusion. The patient is not nervous/anxious.    All other systems reviewed and are negative.    Objective   Vitals:    03/29/21 1258   BP: 122/78   Pulse: 80   Temp: 97.5 °F (36.4 °C)   SpO2: 95%   Weight: 56.2 kg (124 lb)   Height: 157.5 cm (62\")     Body mass index is 22.68 kg/m².  Physical Exam  Constitutional:       Appearance: She is well-developed.   HENT:      Head: Normocephalic and atraumatic.   Eyes:      Pupils: Pupils are equal, round, and reactive to light.   Neck:      Trachea: No tracheal deviation.   Cardiovascular:      Rate and Rhythm: Normal rate and regular rhythm.      Heart sounds: Normal heart sounds. No murmur heard.   No friction rub. No gallop.    Pulmonary:      Effort: Pulmonary effort is normal. No respiratory distress.      Breath sounds: Normal breath sounds. No wheezing or rales.   Chest:      Chest wall: No tenderness.   Abdominal:      General: Bowel sounds are normal. There is no distension.      Palpations: Abdomen is soft. Abdomen is not rigid.      Tenderness: There is no abdominal tenderness. There is no guarding or rebound.   Musculoskeletal:         General: No tenderness or deformity. Normal range of motion.      Cervical " back: Normal range of motion and neck supple.   Skin:     General: Skin is warm and dry.      Coloration: Skin is not pale.      Findings: No rash.   Neurological:      Mental Status: She is alert and oriented to person, place, and time.      Deep Tendon Reflexes: Reflexes are normal and symmetric.   Psychiatric:         Behavior: Behavior normal.         Thought Content: Thought content normal.         Judgment: Judgment normal.       Assessment/Plan   Diagnoses and all orders for this visit:    1. Other acute gastritis without hemorrhage (Primary)    2. Nonsmoker    3. Obesity, unspecified obesity severity, unspecified obesity type    4. Constipation, unspecified constipation type    Other orders  -     sucralfate (CARAFATE) 1 g tablet; Take 1 tablet by mouth 4 (Four) Times a Day Before Meals & at Bedtime.  Dispense: 90 tablet; Refill: 2    continue current regimen Miralax and metamucil  I discussed non pharmaceutical treatment of gerd.  This includes gradually losing weight to achieve ideal body wt., elevation of the head of bed by 4-6 inches, nothing to eat or drink 3 hours prior to lying down, avoiding tight clothing, stress reduction, tobacco cessation, reduction of alcohol intake, and dietary restrictions (avoiding caffeine, coffee, fatty foods, mints, chocolate, spicy foods and tomato based sauces as much as possible).    Continue Protonix/carafate  To keep follow up with her PCP regarding her back pain and lungs.   Discussed with her colonoscopy and she declines at this time. She is aware of all risks, benefits, indications, and alternatives. She is aware that she could develop or have a colon cancer. She does not want to do with advanced age.    Part of this note may be an electronic transcription/translation of spoken language to printed text using the Dragon Dictation System.  Body mass index is 22.68 kg/m².  No follow-ups on file.    Patient's Body mass index is 22.68 kg/m². BMI is within normal  parameters. No follow-up required..      All risks, benefits, alternatives, and indications of colonoscopy and/or Endoscopy procedure have been discussed with the patient. Risks to include perforation of the colon requiring possible surgery or colostomy, risk of bleeding from biopsies or removal of colon tissue, possibility of missing a colon polyp or cancer, or adverse drug reaction.  Benefits to include the diagnosis and management of disease of the colon and rectum. Alternatives to include barium enema, radiographic evaluation, lab testing or no intervention. Pt verbalizes understanding and agrees.     Monika Soto, APRN  3/29/2021  13:31 CDT          If you smoke or use tobacco, 4 minutes reading provided  Steps to Quit Smoking  Smoking tobacco can be harmful to your health and can affect almost every organ in your body. Smoking puts you, and those around you, at risk for developing many serious chronic diseases. Quitting smoking is difficult, but it is one of the best things that you can do for your health. It is never too late to quit.  What are the benefits of quitting smoking?  When you quit smoking, you lower your risk of developing serious diseases and conditions, such as:  · Lung cancer or lung disease, such as COPD.  · Heart disease.  · Stroke.  · Heart attack.  · Infertility.  · Osteoporosis and bone fractures.  Additionally, symptoms such as coughing, wheezing, and shortness of breath may get better when you quit. You may also find that you get sick less often because your body is stronger at fighting off colds and infections. If you are pregnant, quitting smoking can help to reduce your chances of having a baby of low birth weight.  How do I get ready to quit?  When you decide to quit smoking, create a plan to make sure that you are successful. Before you quit:  · Pick a date to quit. Set a date within the next two weeks to give you time to prepare.  · Write down the reasons why you are  quitting. Keep this list in places where you will see it often, such as on your bathroom mirror or in your car or wallet.  · Identify the people, places, things, and activities that make you want to smoke (triggers) and avoid them. Make sure to take these actions:  ¨ Throw away all cigarettes at home, at work, and in your car.  ¨ Throw away smoking accessories, such as ashtrays and lighters.  ¨ Clean your car and make sure to empty the ashtray.  ¨ Clean your home, including curtains and carpets.  · Tell your family, friends, and coworkers that you are quitting. Support from your loved ones can make quitting easier.  · Talk with your health care provider about your options for quitting smoking.  · Find out what treatment options are covered by your health insurance.  What strategies can I use to quit smoking?  Talk with your healthcare provider about different strategies to quit smoking. Some strategies include:  · Quitting smoking altogether instead of gradually lessening how much you smoke over a period of time. Research shows that quitting “cold turkey” is more successful than gradually quitting.  · Attending in-person counseling to help you build problem-solving skills. You are more likely to have success in quitting if you attend several counseling sessions. Even short sessions of 10 minutes can be effective.  · Finding resources and support systems that can help you to quit smoking and remain smoke-free after you quit. These resources are most helpful when you use them often. They can include:  ¨ Online chats with a counselor.  ¨ Telephone quitlines.  ¨ Printed self-help materials.  ¨ Support groups or group counseling.  ¨ Text messaging programs.  ¨ Mobile phone applications.  · Taking medicines to help you quit smoking. (If you are pregnant or breastfeeding, talk with your health care provider first.) Some medicines contain nicotine and some do not. Both types of medicines help with cravings, but the  medicines that include nicotine help to relieve withdrawal symptoms. Your health care provider may recommend:  ¨ Nicotine patches, gum, or lozenges.  ¨ Nicotine inhalers or sprays.  ¨ Non-nicotine medicine that is taken by mouth.  Talk with your health care provider about combining strategies, such as taking medicines while you are also receiving in-person counseling. Using these two strategies together makes you more likely to succeed in quitting than if you used either strategy on its own.  If you are pregnant or breastfeeding, talk with your health care provider about finding counseling or other support strategies to quit smoking. Do not take medicine to help you quit smoking unless told to do so by your health care provider.  What things can I do to make it easier to quit?  Quitting smoking might feel overwhelming at first, but there is a lot that you can do to make it easier. Take these important actions:  · Reach out to your family and friends and ask that they support and encourage you during this time. Call telephone quitlines, reach out to support groups, or work with a counselor for support.  · Ask people who smoke to avoid smoking around you.  · Avoid places that trigger you to smoke, such as bars, parties, or smoke-break areas at work.  · Spend time around people who do not smoke.  · Lessen stress in your life, because stress can be a smoking trigger for some people. To lessen stress, try:  ¨ Exercising regularly.  ¨ Deep-breathing exercises.  ¨ Yoga.  ¨ Meditating.  ¨ Performing a body scan. This involves closing your eyes, scanning your body from head to toe, and noticing which parts of your body are particularly tense. Purposefully relax the muscles in those areas.  · Download or purchase mobile phone or tablet apps (applications) that can help you stick to your quit plan by providing reminders, tips, and encouragement. There are many free apps, such as QuitGuide from the CDC (Centers for Disease  Control and Prevention). You can find other support for quitting smoking (smoking cessation) through smokefree.gov and other websites.  How will I feel when I quit smoking?  Within the first 24 hours of quitting smoking, you may start to feel some withdrawal symptoms. These symptoms are usually most noticeable 2-3 days after quitting, but they usually do not last beyond 2-3 weeks. Changes or symptoms that you might experience include:  · Mood swings.  · Restlessness, anxiety, or irritation.  · Difficulty concentrating.  · Dizziness.  · Strong cravings for sugary foods in addition to nicotine.  · Mild weight gain.  · Constipation.  · Nausea.  · Coughing or a sore throat.  · Changes in how your medicines work in your body.  · A depressed mood.  · Difficulty sleeping (insomnia).  After the first 2-3 weeks of quitting, you may start to notice more positive results, such as:  · Improved sense of smell and taste.  · Decreased coughing and sore throat.  · Slower heart rate.  · Lower blood pressure.  · Clearer skin.  · The ability to breathe more easily.  · Fewer sick days.  Quitting smoking is very challenging for most people. Do not get discouraged if you are not successful the first time. Some people need to make many attempts to quit before they achieve long-term success. Do your best to stick to your quit plan, and talk with your health care provider if you have any questions or concerns.  This information is not intended to replace advice given to you by your health care provider. Make sure you discuss any questions you have with your health care provider.  Document Released: 12/12/2002 Document Revised: 08/15/2017 Document Reviewed: 05/03/2016  Elsevier Interactive Patient Education © 2017 [employer]

## 2021-04-01 NOTE — OUTREACH NOTE
Stroke Week 3 Survey      Responses   [facility] patient discharged from?  [place]   Does the patient have one of the following disease processes/diagnoses(primary or secondary)?  Stroke (TIA)   Week 3 attempt successful?  Yes   Call start time  0945   Call end time  0949   Meds reviewed with patient/caregiver?  Yes   Is the patient taking all medications as directed (includes completed medication regime)?  Yes   Has the patient kept scheduled appointments due by today?  Yes   Comments  Seen Michele Young  on Monday and vascularr?   Has home health visited the patient within 72 hours of discharge?  N/A   Does the patient require any assistance with activities of daily living such as eating, bathing, dressing, walking, etc.?  Yes   Does the patient have any residual symptoms from stroke/TIA?  No   Does the patient understand the diet ordered at discharge?  Yes   What is the patient's perception of their health status since discharge?  Improving   Is the patient able to teach back FAST for Stroke?  Yes [patient is aware]   Is the patient/caregiver able to teach back the risk factors for a stroke?  High Cholesterol   If the patient is a current smoker, are they able to teach back resources for cessation?  Not a smoker   Week 3 call completed?  Yes   Wrap up additional comments  doing well will be going for therapy today. has good family support          Daysi Soto RN

## 2021-04-08 NOTE — OUTREACH NOTE
Stroke Week 4 Survey      Responses   [facility] patient discharged from?  [place]   Does the patient have one of the following disease processes/diagnoses(primary or secondary)?  Stroke (TIA)   Week 4 attempt successful?  Yes   Call start time  1524   Call end time  1527   Meds reviewed with patient/caregiver?  Yes   Is the patient having any side effects they believe may be caused by any medication additions or changes?  No   Is the patient taking all medications as directed (includes completed medication regime)?  Yes   Has the patient kept scheduled appointments due by today?  Yes   Is the patient still receiving Home Health Services?  No   Does the patient require any assistance with activities of daily living such as eating, bathing, dressing, walking, etc.?  No   Does the patient have any residual symptoms from stroke/TIA?  No   Does the patient understand the diet ordered at discharge?  Yes   What is the patient's perception of their health status since discharge?  Improving   Nursing interventions  Nurse provided patient education   Is the patient able to teach back FAST for Stroke?  Yes   Is the patient/caregiver able to teach back the risk factors for a stroke?  History of TIAs, High Cholesterol, Physical inactivity and obesity   Is the patient/caregiver able to teach back signs and symptoms related to disease process for when to call PCP?  Yes   Is the patient/caregiver able to teach back signs and symptoms related to disease process for when to call 911?  Yes   If the patient is a current smoker, are they able to teach back resources for cessation?  Not a smoker   Is the patient/caregiver able to teach back the hierarchy of who to call/visit for symptoms/problems? PCP, Specialist, Home health nurse, Urgent Care, ED, 911  Yes   Week 4 Call Completed?  Yes   Would the patient like one additional call?  Yes   Wrap up additional comments  Pt is doing well,  going to the bin. Pt does not have any  residiual symptoms of stroke. No questons/concerns at this time. Would like one more call.          Tiffanie Hernandez RN

## 2021-04-16 NOTE — OUTREACH NOTE
Stroke Week 5 Survey      Responses   [facility] patient discharged from?  [place]   Does the patient have one of the following disease processes/diagnoses(primary or secondary)?  Stroke (TIA)   Week 5 attempt successful?  Yes   Call start time  1424   Call end time  1427   Discharge diagnosis  **TIA (transient ischemic attack   Is patient permission given to speak with other caregiver?  Yes   List who call center can speak with  dtr or son   Meds reviewed with patient/caregiver?  Yes   Is the patient taking all medications as directed (includes completed medication regime)?  Yes   Medication comments  PCP prescribed her a med to stop the diarrhea today.   Has the patient kept scheduled appointments due by today?  Yes   Is the patient still receiving Home Health Services?  N/A   Does the patient require any assistance with activities of daily living such as eating, bathing, dressing, walking, etc.?  No   Does the patient have any residual symptoms from stroke/TIA?  No   Does the patient understand the diet ordered at discharge?  Yes   Comments  Pt continues to have diarrhea daily but she does report that she is taking Miralax & a stool softener daily. She stopped it yesterday.   What is the patient's perception of their health status since discharge?  New symptoms unrelated to diagnosis   Is the patient able to teach back FAST for Stroke?  Yes   Is the patient/caregiver able to teach back the risk factors for a stroke?  High Cholesterol   Is the patient/caregiver able to teach back the hierarchy of who to call/visit for symptoms/problems? PCP, Specialist, Home health nurse, Urgent Care, ED, 911  Yes   Graduated  Yes   Is the patient interested in additional calls from an ambulatory ?  NOTE:  applies to high risk patients requiring additional follow-up.  No   Did the patient feel the follow up calls were helpful during their recovery period?  Yes   Was the number of calls appropriate?  Yes           Ely Maravilla RN

## 2021-05-17 NOTE — TELEPHONE ENCOUNTER
Called pt about rescheduling apt from 05/12/2021. Pt daughter stated she was sick and would call back to reschedule once better

## 2021-05-18 NOTE — TELEPHONE ENCOUNTER
Grand daughter returns call. Unable to make appointment tomorrow due to sickness in patient. Will ask Camilla to contact for rescheduling. Can schedule at first available with Ryan on Dr. Dickson day. Or, may see Dr. Dickson at his next opening.

## 2021-05-18 NOTE — TELEPHONE ENCOUNTER
Attempted to reach patient regarding appointment reminder 05/19/21. LVM with grand-daughter, requested call back to confirm. Number for daughter, Kaylyn, disconnected. My direct extension left on  for call back.    *Appears recent appointments have been canceled due to sickness, would ensure that patient is no longer sick before confirming appointment.

## (undated) DEVICE — TBG SMPL FLTR LINE NASL 02/C02 A/ BX/100

## (undated) DEVICE — Device: Brand: DEFENDO AIR/WATER/SUCTION AND BIOPSY VALVE

## (undated) DEVICE — THE SINGLE USE ETRAP – POLYP TRAP IS USED FOR SUCTION RETRIEVAL OF ENDOSCOPICALLY REMOVED POLYPS.: Brand: ETRAP

## (undated) DEVICE — CONMED SCOPE SAVER BITE BLOCK, 20X27 MM: Brand: SCOPE SAVER

## (undated) DEVICE — CUFF,BP,DISP,1 TUBE,ADULT,HP: Brand: MEDLINE

## (undated) DEVICE — YANKAUER,BULB TIP WITH VENT: Brand: ARGYLE

## (undated) DEVICE — THE CHANNEL CLEANING BRUSH IS A NYLON FLEXI BRUSH ATTACHED TO A FLEXIBLE PLASTIC SHEATH DESIGNED TO SAFELY REMOVE DEBRIS FROM FLEXIBLE ENDOSCOPES.

## (undated) DEVICE — FRCP BX RADJAW4 NDL 2.8 240 STD OG

## (undated) DEVICE — SNAR POLYP SENSATION MICRO OVL 13 240X40

## (undated) DEVICE — SENSR O2 OXIMAX FNGR A/ 18IN NONSTR